# Patient Record
Sex: FEMALE | Race: WHITE | NOT HISPANIC OR LATINO | Employment: OTHER | URBAN - METROPOLITAN AREA
[De-identification: names, ages, dates, MRNs, and addresses within clinical notes are randomized per-mention and may not be internally consistent; named-entity substitution may affect disease eponyms.]

---

## 2017-01-17 ENCOUNTER — APPOINTMENT (OUTPATIENT)
Dept: LAB | Facility: HOSPITAL | Age: 77
End: 2017-01-17
Payer: MEDICARE

## 2017-01-17 ENCOUNTER — TRANSCRIBE ORDERS (OUTPATIENT)
Dept: LAB | Facility: HOSPITAL | Age: 77
End: 2017-01-17

## 2017-01-17 DIAGNOSIS — R31.29 MICROSCOPIC HEMATURIA: ICD-10-CM

## 2017-01-17 DIAGNOSIS — H11.31 CONJUNCTIVAL HEMORRHAGE OF RIGHT EYE: ICD-10-CM

## 2017-01-17 DIAGNOSIS — R31.29 MICROSCOPIC HEMATURIA: Primary | ICD-10-CM

## 2017-01-17 LAB
BACTERIA UR QL AUTO: ABNORMAL /HPF
BILIRUB UR QL STRIP: NEGATIVE
CLARITY UR: CLEAR
COLOR UR: YELLOW
GLUCOSE UR STRIP-MCNC: NEGATIVE MG/DL
HGB UR QL STRIP.AUTO: ABNORMAL
KETONES UR STRIP-MCNC: ABNORMAL MG/DL
LEUKOCYTE ESTERASE UR QL STRIP: NEGATIVE
NITRITE UR QL STRIP: NEGATIVE
NON-SQ EPI CELLS URNS QL MICRO: ABNORMAL /HPF
PH UR STRIP.AUTO: 6.5 [PH] (ref 4.5–8)
PROT UR STRIP-MCNC: NEGATIVE MG/DL
RBC #/AREA URNS AUTO: ABNORMAL /HPF
SP GR UR STRIP.AUTO: 1.02 (ref 1–1.03)
UROBILINOGEN UR QL STRIP.AUTO: 0.2 E.U./DL
WBC #/AREA URNS AUTO: ABNORMAL /HPF

## 2017-01-17 PROCEDURE — 81001 URINALYSIS AUTO W/SCOPE: CPT | Performed by: PHYSICIAN ASSISTANT

## 2017-01-25 ENCOUNTER — APPOINTMENT (OUTPATIENT)
Dept: LAB | Facility: HOSPITAL | Age: 77
End: 2017-01-25
Attending: OPHTHALMOLOGY
Payer: MEDICARE

## 2017-01-25 DIAGNOSIS — H11.31 CONJUNCTIVAL HEMORRHAGE OF RIGHT EYE: ICD-10-CM

## 2017-01-25 LAB
DEPRECATED AT III PPP: 98 % OF NORMAL (ref 92–136)
ERYTHROCYTE [DISTWIDTH] IN BLOOD BY AUTOMATED COUNT: 14.2 % (ref 11.6–15.1)
HCT VFR BLD AUTO: 38.6 % (ref 34.8–46.1)
HGB BLD-MCNC: 12.7 G/DL (ref 11.5–15.4)
INR PPP: 1.02 (ref 0.86–1.16)
MCH RBC QN AUTO: 30.2 PG (ref 26.8–34.3)
MCHC RBC AUTO-ENTMCNC: 32.9 G/DL (ref 31.4–37.4)
MCV RBC AUTO: 92 FL (ref 82–98)
PLATELET # BLD AUTO: 268 THOUSANDS/UL (ref 149–390)
PMV BLD AUTO: 9.7 FL (ref 8.9–12.7)
PROTHROMBIN TIME: 13.5 SECONDS (ref 12–14.3)
RBC # BLD AUTO: 4.2 MILLION/UL (ref 3.81–5.12)
WBC # BLD AUTO: 7.22 THOUSAND/UL (ref 4.31–10.16)

## 2017-01-25 PROCEDURE — 85306 CLOT INHIBIT PROT S FREE: CPT

## 2017-01-25 PROCEDURE — 85027 COMPLETE CBC AUTOMATED: CPT

## 2017-01-25 PROCEDURE — 85303 CLOT INHIBIT PROT C ACTIVITY: CPT

## 2017-01-25 PROCEDURE — 36415 COLL VENOUS BLD VENIPUNCTURE: CPT

## 2017-01-25 PROCEDURE — 81241 F5 GENE: CPT

## 2017-01-25 PROCEDURE — 85610 PROTHROMBIN TIME: CPT

## 2017-01-25 PROCEDURE — 85300 ANTITHROMBIN III ACTIVITY: CPT

## 2017-01-26 LAB — PROT C AG ACT/NOR PPP IA: 125 % OF NORMAL (ref 60–150)

## 2017-01-27 LAB — PROT S ACT/NOR PPP: 78 % (ref 63–140)

## 2017-01-30 ENCOUNTER — APPOINTMENT (OUTPATIENT)
Dept: LAB | Facility: HOSPITAL | Age: 77
End: 2017-01-30
Payer: MEDICARE

## 2017-01-30 ENCOUNTER — ALLSCRIPTS OFFICE VISIT (OUTPATIENT)
Dept: OTHER | Facility: OTHER | Age: 77
End: 2017-01-30

## 2017-01-30 DIAGNOSIS — R31.29 MICROSCOPIC HEMATURIA: ICD-10-CM

## 2017-01-30 PROCEDURE — 88112 CYTOPATH CELL ENHANCE TECH: CPT

## 2017-01-31 LAB
F5 GENE MUT ANL BLD/T: NORMAL
FACTOR V LEIDEN INTERPRETATION: NORMAL

## 2017-06-06 ENCOUNTER — HOSPITAL ENCOUNTER (OUTPATIENT)
Dept: RADIOLOGY | Facility: HOSPITAL | Age: 77
Discharge: HOME | End: 2017-06-06
Payer: MEDICARE

## 2017-06-06 DIAGNOSIS — Z12.31 ENCOUNTER FOR SCREENING MAMMOGRAM FOR BREAST CANCER: ICD-10-CM

## 2017-06-06 PROCEDURE — G0202 SCR MAMMO BI INCL CAD: HCPCS

## 2017-06-14 ENCOUNTER — TRANSCRIBE ORDERS (OUTPATIENT)
Dept: LAB | Facility: HOSPITAL | Age: 77
End: 2017-06-14

## 2017-06-14 ENCOUNTER — APPOINTMENT (OUTPATIENT)
Dept: LAB | Facility: HOSPITAL | Age: 77
End: 2017-06-14
Payer: MEDICARE

## 2017-06-14 DIAGNOSIS — IMO0001 MYALGIA AND MYOSITIS: ICD-10-CM

## 2017-06-14 DIAGNOSIS — IMO0001 MYALGIA AND MYOSITIS: Primary | ICD-10-CM

## 2017-06-14 LAB
CK SERPL-CCNC: 129 U/L (ref 26–192)
ERYTHROCYTE [SEDIMENTATION RATE] IN BLOOD: 6 MM/HOUR (ref 0–20)

## 2017-06-14 PROCEDURE — 36415 COLL VENOUS BLD VENIPUNCTURE: CPT

## 2017-06-14 PROCEDURE — 85652 RBC SED RATE AUTOMATED: CPT

## 2017-06-14 PROCEDURE — 82550 ASSAY OF CK (CPK): CPT

## 2017-10-02 DIAGNOSIS — N39.0 URINARY TRACT INFECTION: ICD-10-CM

## 2017-10-03 ENCOUNTER — APPOINTMENT (OUTPATIENT)
Dept: LAB | Facility: HOSPITAL | Age: 77
End: 2017-10-03
Attending: UROLOGY
Payer: MEDICARE

## 2017-10-03 ENCOUNTER — TRANSCRIBE ORDERS (OUTPATIENT)
Dept: LAB | Facility: HOSPITAL | Age: 77
End: 2017-10-03

## 2017-10-03 DIAGNOSIS — N39.0 URINARY TRACT INFECTION: ICD-10-CM

## 2017-10-03 LAB
BACTERIA UR QL AUTO: ABNORMAL /HPF
BILIRUB UR QL STRIP: NEGATIVE
CLARITY UR: CLEAR
COLOR UR: YELLOW
GLUCOSE UR STRIP-MCNC: NEGATIVE MG/DL
HGB UR QL STRIP.AUTO: ABNORMAL
HYALINE CASTS #/AREA URNS LPF: ABNORMAL /LPF
KETONES UR STRIP-MCNC: NEGATIVE MG/DL
LEUKOCYTE ESTERASE UR QL STRIP: NEGATIVE
NITRITE UR QL STRIP: NEGATIVE
NON-SQ EPI CELLS URNS QL MICRO: ABNORMAL /HPF
PH UR STRIP.AUTO: 5.5 [PH] (ref 4.5–8)
PROT UR STRIP-MCNC: NEGATIVE MG/DL
RBC #/AREA URNS AUTO: ABNORMAL /HPF
SP GR UR STRIP.AUTO: 1.01 (ref 1–1.03)
UROBILINOGEN UR QL STRIP.AUTO: 0.2 E.U./DL
WBC #/AREA URNS AUTO: ABNORMAL /HPF

## 2017-10-03 PROCEDURE — 87086 URINE CULTURE/COLONY COUNT: CPT

## 2017-10-03 PROCEDURE — 81001 URINALYSIS AUTO W/SCOPE: CPT

## 2017-10-04 LAB — BACTERIA UR CULT: NORMAL

## 2018-01-15 VITALS
HEIGHT: 67 IN | DIASTOLIC BLOOD PRESSURE: 70 MMHG | WEIGHT: 171.13 LBS | BODY MASS INDEX: 26.86 KG/M2 | SYSTOLIC BLOOD PRESSURE: 116 MMHG

## 2018-01-22 ENCOUNTER — APPOINTMENT (OUTPATIENT)
Dept: LAB | Facility: HOSPITAL | Age: 78
End: 2018-01-22
Payer: MEDICARE

## 2018-01-22 ENCOUNTER — TRANSCRIBE ORDERS (OUTPATIENT)
Dept: LAB | Facility: HOSPITAL | Age: 78
End: 2018-01-22

## 2018-01-22 DIAGNOSIS — M50.320 DEGENERATION OF INTERVERTEBRAL DISC OF MID-CERVICAL REGION, UNSPECIFIED SPINAL LEVEL: ICD-10-CM

## 2018-01-22 DIAGNOSIS — L40.0 PSORIASIS VULGARIS: ICD-10-CM

## 2018-01-22 DIAGNOSIS — M70.61 TROCHANTERIC BURSITIS OF RIGHT HIP: ICD-10-CM

## 2018-01-22 DIAGNOSIS — M25.551 PAIN OF RIGHT HIP JOINT: ICD-10-CM

## 2018-01-22 DIAGNOSIS — M54.5 LOW BACK PAIN, UNSPECIFIED BACK PAIN LATERALITY, UNSPECIFIED CHRONICITY, WITH SCIATICA PRESENCE UNSPECIFIED: ICD-10-CM

## 2018-01-22 DIAGNOSIS — M16.11 PRIMARY OSTEOARTHRITIS OF RIGHT HIP: ICD-10-CM

## 2018-01-22 DIAGNOSIS — M25.552 ACUTE PAIN OF LEFT HIP: ICD-10-CM

## 2018-01-22 DIAGNOSIS — M25.511 RIGHT SHOULDER PAIN, UNSPECIFIED CHRONICITY: ICD-10-CM

## 2018-01-22 DIAGNOSIS — M54.2 CERVICALGIA: ICD-10-CM

## 2018-01-22 DIAGNOSIS — M54.5 LOW BACK PAIN, UNSPECIFIED BACK PAIN LATERALITY, UNSPECIFIED CHRONICITY, WITH SCIATICA PRESENCE UNSPECIFIED: Primary | ICD-10-CM

## 2018-01-22 DIAGNOSIS — M25.562 LEFT KNEE PAIN, UNSPECIFIED CHRONICITY: ICD-10-CM

## 2018-01-22 LAB
ALBUMIN SERPL BCP-MCNC: 3.6 G/DL (ref 3.5–5)
ALP SERPL-CCNC: 62 U/L (ref 46–116)
ALT SERPL W P-5'-P-CCNC: 20 U/L (ref 12–78)
ANION GAP SERPL CALCULATED.3IONS-SCNC: 4 MMOL/L (ref 4–13)
AST SERPL W P-5'-P-CCNC: 13 U/L (ref 5–45)
BASOPHILS # BLD AUTO: 0.1 THOUSANDS/ΜL (ref 0–0.1)
BASOPHILS NFR BLD AUTO: 2 % (ref 0–1)
BILIRUB SERPL-MCNC: 0.57 MG/DL (ref 0.2–1)
BUN SERPL-MCNC: 16 MG/DL (ref 5–25)
CALCIUM SERPL-MCNC: 9.2 MG/DL (ref 8.3–10.1)
CHLORIDE SERPL-SCNC: 108 MMOL/L (ref 100–108)
CO2 SERPL-SCNC: 28 MMOL/L (ref 21–32)
CREAT SERPL-MCNC: 0.71 MG/DL (ref 0.6–1.3)
CRP SERPL QL: 3.4 MG/L
EOSINOPHIL # BLD AUTO: 0.59 THOUSAND/ΜL (ref 0–0.61)
EOSINOPHIL NFR BLD AUTO: 11 % (ref 0–6)
ERYTHROCYTE [DISTWIDTH] IN BLOOD BY AUTOMATED COUNT: 13.9 % (ref 11.6–15.1)
ERYTHROCYTE [SEDIMENTATION RATE] IN BLOOD: 9 MM/HOUR (ref 0–20)
GFR SERPL CREATININE-BSD FRML MDRD: 82 ML/MIN/1.73SQ M
GLUCOSE P FAST SERPL-MCNC: 82 MG/DL (ref 65–99)
HCT VFR BLD AUTO: 36.9 % (ref 34.8–46.1)
HGB BLD-MCNC: 12.4 G/DL (ref 11.5–15.4)
LYMPHOCYTES # BLD AUTO: 2.1 THOUSANDS/ΜL (ref 0.6–4.47)
LYMPHOCYTES NFR BLD AUTO: 40 % (ref 14–44)
MCH RBC QN AUTO: 31.1 PG (ref 26.8–34.3)
MCHC RBC AUTO-ENTMCNC: 33.6 G/DL (ref 31.4–37.4)
MCV RBC AUTO: 93 FL (ref 82–98)
MONOCYTES # BLD AUTO: 0.47 THOUSAND/ΜL (ref 0.17–1.22)
MONOCYTES NFR BLD AUTO: 9 % (ref 4–12)
NEUTROPHILS # BLD AUTO: 1.95 THOUSANDS/ΜL (ref 1.85–7.62)
NEUTS SEG NFR BLD AUTO: 38 % (ref 43–75)
NRBC BLD AUTO-RTO: 0 /100 WBCS
PLATELET # BLD AUTO: 249 THOUSANDS/UL (ref 149–390)
PMV BLD AUTO: 9.8 FL (ref 8.9–12.7)
POTASSIUM SERPL-SCNC: 4.4 MMOL/L (ref 3.5–5.3)
PROT SERPL-MCNC: 7.3 G/DL (ref 6.4–8.2)
RBC # BLD AUTO: 3.99 MILLION/UL (ref 3.81–5.12)
SODIUM SERPL-SCNC: 140 MMOL/L (ref 136–145)
TSH SERPL DL<=0.05 MIU/L-ACNC: 3.16 UIU/ML (ref 0.36–3.74)
WBC # BLD AUTO: 5.21 THOUSAND/UL (ref 4.31–10.16)

## 2018-01-22 PROCEDURE — 85025 COMPLETE CBC W/AUTO DIFF WBC: CPT

## 2018-01-22 PROCEDURE — 85652 RBC SED RATE AUTOMATED: CPT

## 2018-01-22 PROCEDURE — 36415 COLL VENOUS BLD VENIPUNCTURE: CPT

## 2018-01-22 PROCEDURE — 86140 C-REACTIVE PROTEIN: CPT

## 2018-01-22 PROCEDURE — 80053 COMPREHEN METABOLIC PANEL: CPT

## 2018-01-22 PROCEDURE — 84443 ASSAY THYROID STIM HORMONE: CPT

## 2018-01-23 ENCOUNTER — HOSPITAL ENCOUNTER (OUTPATIENT)
Dept: RADIOLOGY | Facility: HOSPITAL | Age: 78
Discharge: HOME | End: 2018-01-23
Payer: MEDICARE

## 2018-01-23 DIAGNOSIS — M25.551 PAIN OF RIGHT HIP JOINT: ICD-10-CM

## 2018-01-23 DIAGNOSIS — M25.552 ACUTE PAIN OF LEFT HIP: ICD-10-CM

## 2018-01-23 DIAGNOSIS — M25.562 LEFT KNEE PAIN, UNSPECIFIED CHRONICITY: ICD-10-CM

## 2018-01-23 DIAGNOSIS — M54.5 LOW BACK PAIN, UNSPECIFIED BACK PAIN LATERALITY, UNSPECIFIED CHRONICITY, WITH SCIATICA PRESENCE UNSPECIFIED: ICD-10-CM

## 2018-01-23 PROCEDURE — 73523 X-RAY EXAM HIPS BI 5/> VIEWS: CPT

## 2018-01-23 PROCEDURE — 73564 X-RAY EXAM KNEE 4 OR MORE: CPT

## 2018-01-23 PROCEDURE — 72110 X-RAY EXAM L-2 SPINE 4/>VWS: CPT

## 2018-01-23 PROCEDURE — 72200 X-RAY EXAM SI JOINTS: CPT

## 2018-01-30 ENCOUNTER — APPOINTMENT (OUTPATIENT)
Dept: LAB | Facility: HOSPITAL | Age: 78
End: 2018-01-30
Payer: MEDICARE

## 2018-01-30 DIAGNOSIS — R31.29 OTHER MICROSCOPIC HEMATURIA: ICD-10-CM

## 2018-01-30 LAB
BACTERIA UR QL AUTO: ABNORMAL /HPF
BILIRUB UR QL STRIP: NEGATIVE
CLARITY UR: CLEAR
COLOR UR: YELLOW
GLUCOSE UR STRIP-MCNC: NEGATIVE MG/DL
HGB UR QL STRIP.AUTO: ABNORMAL
KETONES UR STRIP-MCNC: NEGATIVE MG/DL
LEUKOCYTE ESTERASE UR QL STRIP: NEGATIVE
NITRITE UR QL STRIP: NEGATIVE
NON-SQ EPI CELLS URNS QL MICRO: ABNORMAL /HPF
PH UR STRIP.AUTO: 5.5 [PH] (ref 4.5–8)
PROT UR STRIP-MCNC: NEGATIVE MG/DL
RBC #/AREA URNS AUTO: ABNORMAL /HPF
SP GR UR STRIP.AUTO: 1.01 (ref 1–1.03)
UROBILINOGEN UR QL STRIP.AUTO: 0.2 E.U./DL
WBC #/AREA URNS AUTO: ABNORMAL /HPF

## 2018-01-30 PROCEDURE — 81001 URINALYSIS AUTO W/SCOPE: CPT

## 2018-01-30 PROCEDURE — 88112 CYTOPATH CELL ENHANCE TECH: CPT

## 2018-01-30 PROCEDURE — 88112 CYTOPATH CELL ENHANCE TECH: CPT | Performed by: PATHOLOGY

## 2018-02-12 ENCOUNTER — OFFICE VISIT (OUTPATIENT)
Dept: UROLOGY | Facility: AMBULATORY SURGERY CENTER | Age: 78
End: 2018-02-12
Payer: MEDICARE

## 2018-02-12 VITALS
HEART RATE: 72 BPM | WEIGHT: 169 LBS | SYSTOLIC BLOOD PRESSURE: 104 MMHG | HEIGHT: 67 IN | DIASTOLIC BLOOD PRESSURE: 66 MMHG | BODY MASS INDEX: 26.53 KG/M2

## 2018-02-12 DIAGNOSIS — R35.0 INCREASED FREQUENCY OF URINATION: ICD-10-CM

## 2018-02-12 DIAGNOSIS — R31.29 MICROSCOPIC HEMATURIA: Primary | ICD-10-CM

## 2018-02-12 PROCEDURE — 99213 OFFICE O/P EST LOW 20 MIN: CPT | Performed by: NURSE PRACTITIONER

## 2018-02-12 RX ORDER — MULTIVITAMIN
TABLET ORAL
COMMUNITY
End: 2021-03-29 | Stop reason: ALTCHOICE

## 2018-02-12 RX ORDER — MULTIVIT WITH MINERALS/LUTEIN
TABLET ORAL DAILY
COMMUNITY

## 2018-02-12 NOTE — PROGRESS NOTES
2/12/2018    Randee Nunez  1940  [id number]        Assessment  Microscopic hematuria  Atypic cytology  Urinary frequency    Discussion  Naa French is a 65 y o  female being managed by Dr Paula  Her urine cytology continues to show rare atypical urothelial cells  However, there is no evidence of microscopic hematuria on her urinalysis  She is s/p an urethral and bladder biopsy in 3/2009, which was negative for malignancy  We discussed the use of  anticholinergics but she defers  Instructed to avoid bladder irritants  She will return in 1 year for follow up with an urinalysis and urine cytology  Instructed to call with any issues  History of Present Illness  65 y o  female with a history of hematuria and abnormal urine cytology presents today for 1 year follow up  She denies any gross hematuria  She denies any lower urinary tract symptoms except for urinary frequency and urgency  She has nocturia 2-4 times during the night  Overall she does not find urinary symptoms bothersome  She denies any incontinence  She denies any changes in overall health and is doing well  Review of Systems  Review of Systems   Constitutional: Negative  HENT: Negative  Respiratory: Negative  Cardiovascular: Negative  Gastrointestinal: Negative  Genitourinary: Positive for frequency and urgency  Musculoskeletal: Negative  Skin: Negative  Neurological: Negative  Hematological: Negative  Urinary Incontinence Screening    Flowsheet Row Most Recent Value   Urinary Incontinence   Urinary Incontinence? No   Incomplete emptying? Yes   Urinary frequency? Yes [occ]   Urinary urgency? Yes [occ  feel as if urine comes out when not feeling to go]   Urinary hesitancy? No   Dysuria (painful difficult urination)? No   Nocturia (waking up to use the bathroom)? Yes [2-4 times]   Straining (having to push to go)? No   Weak stream?  Yes   Intermittent stream?  Yes [occ]   Post void dribbling?   No Vaginal pressure? No   Vaginal dryness? No            Past Medical History  Past Medical History:   Diagnosis Date    Bladder polyps     Breast cancer ([facility] [address] )     GERD (gastroesophageal reflux disease)        Past Surgical History  Past Surgical History:   Procedure Laterality Date    APPENDECTOMY      BREAST SURGERY      CYSTOSCOPY          Past Family History  Family History   Problem Relation Age of Onset    Lymphoma Mother     Heart disease Father        Past Social history  Social History     Social History    Marital status: /Civil Union     Spouse name: N/A    Number of children: N/A    Years of education: N/A     Occupational History    Not on file  Social History Main Topics    Smoking status: Never Smoker    Smokeless tobacco: Never Used    Alcohol use Yes      Comment: SOCIAL DRINKER    Drug use: No    Sexual activity: Not on file     Other Topics Concern    Not on file     Social History Narrative    No narrative on file       Current Medications  Current Outpatient Prescriptions   Medication Sig Dispense Refill    Ascorbic Acid (VITAMIN C) 1000 MG tablet Take by mouth      B-Complex-C CAPS Take by mouth      Misc Natural Products (OSTEO BI-FLEX ADV JOINT SHIELD) TABS Take by mouth      Multiple Vitamin (MULTI-VITAMIN DAILY) TABS Take by mouth       No current facility-administered medications for this visit  Allergies  No Known Allergies    Past Medical History, Social History, Family History, medications and allergies were reviewed  Vitals  Vitals:    02/12/18 1048   BP: 104/66   Pulse: 72   Weight: 76 7 kg (169 lb)   Height: 5' 7" (1 702 m)       Physical Exam  Skin: warm, dry, intact  Pulmonary: Non-labored breathing  Abdomen: Soft, non-tender, non-distended  Musculoskeletal: AROM with no joint deformity or tenderness    Neurology: Alert and oriented    Results  Lab Results   Component Value Date    GLUCOSE 84 10/24/2014    CALCIUM 9 2 01/22/2018    NA 140 01/22/2018    K 4 4 01/22/2018    CO2 28 01/22/2018     01/22/2018    BUN 16 01/22/2018    CREATININE 0 71 01/22/2018     Lab Results   Component Value Date    WBC 5 21 01/22/2018    HGB 12 4 01/22/2018    HCT 36 9 01/22/2018    MCV 93 01/22/2018     01/22/2018

## 2018-02-23 ENCOUNTER — EVALUATION (OUTPATIENT)
Dept: PHYSICAL THERAPY | Age: 78
End: 2018-02-23
Payer: MEDICARE

## 2018-02-23 DIAGNOSIS — M70.61 TROCHANTERIC BURSITIS OF RIGHT HIP: ICD-10-CM

## 2018-02-23 DIAGNOSIS — M16.11 PRIMARY OSTEOARTHRITIS OF RIGHT HIP: ICD-10-CM

## 2018-02-23 DIAGNOSIS — G89.29 CHRONIC PAIN OF LEFT KNEE: ICD-10-CM

## 2018-02-23 DIAGNOSIS — M16.0 PRIMARY OSTEOARTHRITIS OF BOTH HIPS: ICD-10-CM

## 2018-02-23 DIAGNOSIS — M54.50 CHRONIC BILATERAL LOW BACK PAIN WITHOUT SCIATICA: Primary | ICD-10-CM

## 2018-02-23 DIAGNOSIS — M25.562 CHRONIC PAIN OF LEFT KNEE: ICD-10-CM

## 2018-02-23 DIAGNOSIS — G89.29 CHRONIC BILATERAL LOW BACK PAIN WITHOUT SCIATICA: Primary | ICD-10-CM

## 2018-02-23 PROCEDURE — 97140 MANUAL THERAPY 1/> REGIONS: CPT | Performed by: PHYSICAL THERAPIST

## 2018-02-23 PROCEDURE — G8979 MOBILITY GOAL STATUS: HCPCS | Performed by: PHYSICAL THERAPIST

## 2018-02-23 PROCEDURE — G8978 MOBILITY CURRENT STATUS: HCPCS | Performed by: PHYSICAL THERAPIST

## 2018-02-23 PROCEDURE — 97162 PT EVAL MOD COMPLEX 30 MIN: CPT | Performed by: PHYSICAL THERAPIST

## 2018-02-23 NOTE — LETTER
2018    Paula Judge MD  [address]    Patient: Edison Medina   YOB: 1940   Date of Visit: 2018     Encounter Diagnosis     ICD-10-CM    1  Chronic bilateral low back pain without sciatica M54 5     G89 29    2  Chronic pain of left knee M25 562     G89 29    3  Primary osteoarthritis of right hip M16 11    4  Trochanteric bursitis of right hip M70 61    5  Primary osteoarthritis of both hips M16 0        Dear Dr Sagar Bear:    Please review the attached Plan of Care from [facility] recent visit  Please verify that you agree therapy should continue by signing the attached document and sending it back to our office  If you have any questions or concerns, please don't hesitate to call  Sincerely,    Kushal Carty, PT      Referring Provider:      I certify that I have read the below Plan of Care and certify the need for these services furnished under this plan of treatment while under my care  Paula Judge MD  [facility]: [phone]          PT Evaluation     Today's date: 2018  Patient name: Edison Medina  : 1940  MRN: [id number]  Referring provider: [place] Tineo MD  Dx:   Encounter Diagnosis     ICD-10-CM    1  Chronic bilateral low back pain without sciatica M54 5     G89 29    2  Chronic pain of left knee M25 562     G89 29    3  Primary osteoarthritis of right hip M16 11    4  Trochanteric bursitis of right hip M70 61    5  Primary osteoarthritis of both hips M16 0                   Assessment  Impairments: abnormal gait, abnormal or restricted ROM, impaired physical strength and pain with function    Assessment details: PT IE due to functional mobility due to lumbar spine, bilateral hip and left knee pain that limits all weight baring functional activities    Understanding of Dx/Px/POC: excellent   Prognosis: good  Prognosis details: Patient is a 65y o  year old female seen for outpatient PT evaluation with a mobility deficits due to multiple region pain due to LBP, Bilateral Hip OA and left knee pain  Patient presents to PT IE with the following problems, concerns, deficits and impairments: lumbar, bilateral hip and knee pain, decreased lumbar spine range of motion, decreased bilateral le ROM and strength, + TTP, left knee edema, gait and stair dysfunctions, transfer dysfunctions, functional limitations and decreased tolerance to activity  Patient would benefit from skilled PT services under the following PT treatment plan to address the above noted deficits: therapeutic exercises and activities to facilitate lumbar spine ROM and bilateral LE ROM and MMT, gait and stair training, balance and proprioception activities, modalities, manual therapy techniques, Instrument Aided STM techniques, Kinesio taping, manual and mechanical traction, postural reeducation and strengthening, DLS and abdominal strengthening activities and a hep  Thank you for the referral      Goals  Short Term goals - 3 to 4 weeks  1  Patient will be independent HEP  2   Patient will report a 25 - 50% decrease in pain complaints  3   Increase strength 1/2 grade  4   Increase ROM 5-10 degrees  Long Term goals - 6 to 8 weeks  1  Patient will report elimination of pain complaints  2   Patient will return to all house hold functional activities without restriction  3   Patient will return to all recreational activities without restriction  4   ROM WFL  5   Strength 5/5   6   Patient to reported 50 % or greater reduction in TTP  7   Patient to reported ambulation and stair climbing 50 % or greater with regards to improvements  8   Patient to reported self foto computerized functional index > 10 points      Plan  Patient would benefit from: skilled PT  Planned modality interventions: TENS, thermotherapy: hydrocollator packs, ultrasound and unattended electrical stimulation  Planned therapy interventions: joint mobilization, manual therapy, ADL retraining, ADL training, motor coordination training, muscle pump exercises, balance/weight bearing training, balance, neuromuscular re-education, patient education, postural training, self care, strengthening, stretching, coordination, flexibility, therapeutic activities, therapeutic exercise, functional ROM exercises, gait training, graded activity, graded exercise, graded motor, home exercise program and therapeutic training  Frequency: 2x week  Duration in weeks: 8  Treatment plan discussed with: patient        Subjective Evaluation    History of Present Illness  Mechanism of injury: Patient reported multiple region pain has persisted for several years that limits prolonged standing and walking based functional activities  Patient noted history of bilateral hip injection with one in left and multiple times on right  Patient noted car and transfers limited by bilateral le weakness, difficulty with long term walking are limited by bilateral hip and bilateral knee region  Patient noted she does swim one time per week  Patient reported stiffness in the am limits all transfers, walking that effects bilateral hip and knees  Patient noted stair climbing remains difficult in the am more than once she is " warm up"  Patient noted her left knee pain and swelling  Plus, she noted she will have calf pain  Patient noted cervical spine pain is aggravate with prolonged extension based movements  Patient noted hep and hot shower is effective in cervical spine pain reduction  Patient noted lumbar spine pain is aggravated with walking > static standing and she denies lumbar spine aggravation with sitting  Patient reported lumbar spine pain at worst 8 at 10 and least at 0 of 10 at least, bilateral hip and knee pain at least at 3 of 10 and worst at 8 of 10    Patient noted she will intermittently take Tylenol or Aleve for pain reduction  Quality of life: good    Pain  At best pain ratin  At worst pain ratin  Quality: dull ache  Relieving factors: heat  Aggravating factors: standing, walking and lifting  Progression: worsening    Social Support  Stairs in house: yes   Lives in: multiple-level home  Lives with: spouse      Diagnostic Tests  MRI studies: abnormal  Patient Goals  Patient goals for therapy: decreased edema, decreased pain, increased motion and increased strength  Patient goal: Patient's goal": to get more active again"  Objective     Tenderness     Additional Tenderness Details  Patient is - TTP at lumbar spine erector spinae musculature  Patient is + TTP at bilateral lateral hip region at greater trochanter region at moderate levels and medial and lateral left knee at minimal to moderate levels      Active Range of Motion     Lumbar   Flexion: 106 degrees   Extension: 20 degrees   Left lateral flexion: 22 degrees   Right lateral flexion: 28 degrees   Left rotation: 45 degrees   Right rotation: 35 degrees   Left Hip   Flexion: 100 degrees with pain  Abduction: 22 degrees     Right Hip   Flexion: 94 degrees with pain  Abduction: 30 degrees with pain  Left Knee   Flexion: 116 degrees with pain  Extension: -5 degrees   Extensor la degrees     Right Knee   Flexion: 126 degrees   Extension: -4 degrees   Extensor lag: 10 degrees   Left Ankle/Foot   Dorsiflexion (ke): 10 degrees   Plantar flexion: 60 degrees     Right Ankle/Foot   Dorsiflexion (ke): 8 degrees   Plantar flexion: 55 degrees     Additional Active Range of Motion Details  SLR on right at 90 degrees and left at 90 degrees    Strength/Myotome Testing     Left Hip   Planes of Motion   Flexion: 4+  Extension: 4+  Abduction: 4+  Adduction: 5    Right Hip   Planes of Motion   Flexion: 4+  Extension: 4+  Abduction: 4+  Adduction: 5    Left Knee   Flexion: 4  Prone flexion: 4    Right Knee   Flexion: 4+  Prone flexion: 4+    Left Ankle/Foot   Dorsiflexion: 5  Plantar flexion: 5    Right Ankle/Foot   Dorsiflexion: 5  Plantar flexion: 5    Ambulation     Ambulation: Level Surfaces   Ambulation without assistive device: independent    Additional Level Surfaces Ambulation Details  Patient ambulates with left knee based antalgic gait pattern of decrease in left stance phase and right swing phase  Ambulation: Stairs   Ascend stairs: independent  Pattern: reciprocal  Railings: two rails  Descend stairs: independent  Pattern: non-reciprocal  Railings: two rails    General Comments     Knee Comments  Girth Measurements:  Knee:  Suprapatellar region: Right at 40 2 CM and left at 42 1 CM; Mid patellar region: Right at 39 8 CM and left at 42 8 Cm; Infrapatellar region: Right at 38 4 CM and left at 41 1 Cm;         Flowsheet Rows    Flowsheet Row Most Recent Value   PT/OT G-Codes   Current Score  46   Projected Score  55   FOTO information reviewed  Yes   Assessment Type  Evaluation   G code set  Mobility: Walking & Moving Around   Mobility: Walking and Moving Around Current Status ()  CK   Mobility: Walking and Moving Around Goal Status ()  CK          Precautions: Cervical and Lumbar spine pain aggravation    Daily Treatment Diary     Manual  2/23            Kinesio taping to left knee in a "u" manner with base at paper tape tension and tails at 25 % 10 min            Instrument Aided STM to bilateral lateral hip region  add                                                      Exercise Diary  2/23            Bike             Repeated seated lumbar spine flexion             LAQ:B:             Hip flexion:B:             Piriformis stretch:B:             LTR:B:             DLS abdominal bracing             DLS with hip flexion:B:             DLS with SLR flexion:B:             Bridges             SAQ:B:             SLR x 3:B:             Mini squats             Lunges:B:             Step ups:B:8":             Step downs:B:6":             Lateral step ups:B: Hip hiking:B:                                           Modalities  2/23            MHP and TENS to lumbar spine and bilateral hip MHP

## 2018-02-23 NOTE — PROGRESS NOTES
PT Evaluation     Today's date: 2018  Patient name: Barbara Roldan  : 1940  MRN: [id number]  Referring provider: Lj Sung MD  Dx:   Encounter Diagnosis     ICD-10-CM    1  Chronic bilateral low back pain without sciatica M54 5     G89 29    2  Chronic pain of left knee M25 562     G89 29    3  Primary osteoarthritis of right hip M16 11    4  Trochanteric bursitis of right hip M70 61    5  Primary osteoarthritis of both hips M16 0                   Assessment  Impairments: abnormal gait, abnormal or restricted ROM, impaired physical strength and pain with function    Assessment details: PT IE due to functional mobility due to lumbar spine, bilateral hip and left knee pain that limits all weight baring functional activities  Understanding of Dx/Px/POC: excellent   Prognosis: good  Prognosis details: Patient is a 65y o  year old female seen for outpatient PT evaluation with a mobility deficits due to multiple region pain due to LBP, Bilateral Hip OA and left knee pain  Patient presents to PT IE with the following problems, concerns, deficits and impairments: lumbar, bilateral hip and knee pain, decreased lumbar spine range of motion, decreased bilateral le ROM and strength, + TTP, left knee edema, gait and stair dysfunctions, transfer dysfunctions, functional limitations and decreased tolerance to activity  Patient would benefit from skilled PT services under the following PT treatment plan to address the above noted deficits: therapeutic exercises and activities to facilitate lumbar spine ROM and bilateral LE ROM and MMT, gait and stair training, balance and proprioception activities, modalities, manual therapy techniques, Instrument Aided STM techniques, Kinesio taping, manual and mechanical traction, postural reeducation and strengthening, DLS and abdominal strengthening activities and a hep  Thank you for the referral      Goals  Short Term goals - 3 to 4 weeks  1    Patient will be independent HEP  2   Patient will report a 25 - 50% decrease in pain complaints  3   Increase strength 1/2 grade  4   Increase ROM 5-10 degrees  Long Term goals - 6 to 8 weeks  1  Patient will report elimination of pain complaints  2   Patient will return to all house hold functional activities without restriction  3   Patient will return to all recreational activities without restriction  4   ROM WFL  5   Strength 5/5   6   Patient to reported 50 % or greater reduction in TTP  7   Patient to reported ambulation and stair climbing 50 % or greater with regards to improvements  8   Patient to reported self foto computerized functional index > 10 points  Plan  Patient would benefit from: skilled PT  Planned modality interventions: TENS, thermotherapy: hydrocollator packs, ultrasound and unattended electrical stimulation  Planned therapy interventions: joint mobilization, manual therapy, ADL retraining, ADL training, motor coordination training, muscle pump exercises, balance/weight bearing training, balance, neuromuscular re-education, patient education, postural training, self care, strengthening, stretching, coordination, flexibility, therapeutic activities, therapeutic exercise, functional ROM exercises, gait training, graded activity, graded exercise, graded motor, home exercise program and therapeutic training  Frequency: 2x week  Duration in weeks: 8  Treatment plan discussed with: patient        Subjective Evaluation    History of Present Illness  Mechanism of injury: Patient reported multiple region pain has persisted for several years that limits prolonged standing and walking based functional activities  Patient noted history of bilateral hip injection with one in left and multiple times on right  Patient noted car and transfers limited by bilateral le weakness, difficulty with long term walking are limited by bilateral hip and bilateral knee region    Patient noted she does swim one time per week  Patient reported stiffness in the am limits all transfers, walking that effects bilateral hip and knees  Patient noted stair climbing remains difficult in the am more than once she is " warm up"  Patient noted her left knee pain and swelling  Plus, she noted she will have calf pain  Patient noted cervical spine pain is aggravate with prolonged extension based movements  Patient noted hep and hot shower is effective in cervical spine pain reduction  Patient noted lumbar spine pain is aggravated with walking > static standing and she denies lumbar spine aggravation with sitting  Patient reported lumbar spine pain at worst 8 at 10 and least at 0 of 10 at least, bilateral hip and knee pain at least at 3 of 10 and worst at 8 of 10  Patient noted she will intermittently take Tylenol or Aleve for pain reduction  Quality of life: good    Pain  At best pain ratin  At worst pain ratin  Quality: dull ache  Relieving factors: heat  Aggravating factors: standing, walking and lifting  Progression: worsening    Social Support  Stairs in house: yes   Lives in: multiple-level home  Lives with: spouse      Diagnostic Tests  MRI studies: abnormal  Patient Goals  Patient goals for therapy: decreased edema, decreased pain, increased motion and increased strength  Patient goal: Patient's goal": to get more active again"  Objective     Tenderness     Additional Tenderness Details  Patient is - TTP at lumbar spine erector spinae musculature  Patient is + TTP at bilateral lateral hip region at greater trochanter region at moderate levels and medial and lateral left knee at minimal to moderate levels      Active Range of Motion     Lumbar   Flexion: 106 degrees   Extension: 20 degrees   Left lateral flexion: 22 degrees   Right lateral flexion: 28 degrees   Left rotation: 45 degrees   Right rotation: 35 degrees   Left Hip   Flexion: 100 degrees with pain  Abduction: 22 degrees     Right Hip   Flexion: 94 degrees with pain  Abduction: 30 degrees with pain  Left Knee   Flexion: 116 degrees with pain  Extension: -5 degrees   Extensor la degrees     Right Knee   Flexion: 126 degrees   Extension: -4 degrees   Extensor lag: 10 degrees   Left Ankle/Foot   Dorsiflexion (ke): 10 degrees   Plantar flexion: 60 degrees     Right Ankle/Foot   Dorsiflexion (ke): 8 degrees   Plantar flexion: 55 degrees     Additional Active Range of Motion Details  SLR on right at 90 degrees and left at 90 degrees    Strength/Myotome Testing     Left Hip   Planes of Motion   Flexion: 4+  Extension: 4+  Abduction: 4+  Adduction: 5    Right Hip   Planes of Motion   Flexion: 4+  Extension: 4+  Abduction: 4+  Adduction: 5    Left Knee   Flexion: 4  Prone flexion: 4    Right Knee   Flexion: 4+  Prone flexion: 4+    Left Ankle/Foot   Dorsiflexion: 5  Plantar flexion: 5    Right Ankle/Foot   Dorsiflexion: 5  Plantar flexion: 5    Ambulation     Ambulation: Level Surfaces   Ambulation without assistive device: independent    Additional Level Surfaces Ambulation Details  Patient ambulates with left knee based antalgic gait pattern of decrease in left stance phase and right swing phase  Ambulation: Stairs   Ascend stairs: independent  Pattern: reciprocal  Railings: two rails  Descend stairs: independent  Pattern: non-reciprocal  Railings: two rails    General Comments     Knee Comments  Girth Measurements:  Knee:  Suprapatellar region: Right at 40 2 CM and left at 42 1 CM; Mid patellar region: Right at 39 8 CM and left at 42 8 Cm; Infrapatellar region: Right at 38 4 CM and left at 41 1 Cm;         Flowsheet Rows    Flowsheet Row Most Recent Value   PT/OT G-Codes   Current Score  46   Projected Score  55   FOTO information reviewed  Yes   Assessment Type  Evaluation   G code set  Mobility: Walking & Moving Around   Mobility: Walking and Moving Around Current Status ()  CK   Mobility: Walking and Moving Around Goal Status ()  CK Precautions: Cervical and Lumbar spine pain aggravation    Daily Treatment Diary     Manual  2/23            Kinesio taping to left knee in a "u" manner with base at paper tape tension and tails at 25 % 10 min            Instrument Aided STM to bilateral lateral hip region  add                                                      Exercise Diary  2/23            Bike             Repeated seated lumbar spine flexion             LAQ:B:             Hip flexion:B:             Piriformis stretch:B:             LTR:B:             DLS abdominal bracing             DLS with hip flexion:B:             DLS with SLR flexion:B:             Bridges             SAQ:B:             SLR x 3:B:             Mini squats             Lunges:B:             Step ups:B:8":             Step downs:B:6":             Lateral step ups:B:             Hip hiking:B:                                           Modalities  2/23            MHP and TENS to lumbar spine and bilateral hip MHP

## 2018-02-26 ENCOUNTER — OFFICE VISIT (OUTPATIENT)
Dept: PHYSICAL THERAPY | Age: 78
End: 2018-02-26
Payer: MEDICARE

## 2018-02-26 DIAGNOSIS — M16.11 PRIMARY OSTEOARTHRITIS OF RIGHT HIP: ICD-10-CM

## 2018-02-26 DIAGNOSIS — G89.29 CHRONIC PAIN OF LEFT KNEE: ICD-10-CM

## 2018-02-26 DIAGNOSIS — M16.0 PRIMARY OSTEOARTHRITIS OF BOTH HIPS: ICD-10-CM

## 2018-02-26 DIAGNOSIS — M54.50 CHRONIC BILATERAL LOW BACK PAIN WITHOUT SCIATICA: Primary | ICD-10-CM

## 2018-02-26 DIAGNOSIS — M70.61 TROCHANTERIC BURSITIS OF RIGHT HIP: ICD-10-CM

## 2018-02-26 DIAGNOSIS — M25.562 CHRONIC PAIN OF LEFT KNEE: ICD-10-CM

## 2018-02-26 DIAGNOSIS — G89.29 CHRONIC BILATERAL LOW BACK PAIN WITHOUT SCIATICA: Primary | ICD-10-CM

## 2018-02-26 PROCEDURE — 97110 THERAPEUTIC EXERCISES: CPT

## 2018-02-26 PROCEDURE — 97140 MANUAL THERAPY 1/> REGIONS: CPT

## 2018-02-26 PROCEDURE — 97112 NEUROMUSCULAR REEDUCATION: CPT

## 2018-02-26 NOTE — PROGRESS NOTES
Daily Note     Today's date: 2018  Patient name: Celeste Corral  : 1940  MRN: [id number]  Referring provider: Lizet Mc MD  Dx:   Encounter Diagnosis     ICD-10-CM    1  Chronic bilateral low back pain without sciatica M54 5     G89 29    2  Primary osteoarthritis of right hip M16 11    3  Chronic pain of left knee M25 562     G89 29    4  Trochanteric bursitis of right hip M70 61    5  Primary osteoarthritis of both hips M16 0                   Subjective: 4/10 LB and B knee pain         Objective: See treatment diary below      Assessment: Pt presents with palpable tender areas at B hips R>L       Plan: Progress as able     Cervical and Lumbar spine pain aggravation     Daily Treatment Diary      Manual                     Kinesio taping to left knee in a "u" manner with base at pInstrument Aided STM to bilateral lateral hip regionaper tape tension and tails at 25 % 10 min  10 min                                                                                                                        Exercise Diary                     Bike    10 min                    Repeated seated lumbar spine flexion   20x                   LAQ:B:   20x                   Hip flexion:B:    20x                   Piriformis stretch:B:    20sec 5x                    LTR:B:    3sec 20x                    DLS abdominal bracing    3sec 20x                    DLS with hip flexion:B:    NT                    DLS with SLR flexion:B:   20x                   Bridges    20x                   SAQ:B:    NT                    SLR x 3:B:    20x s/l and flex                    Mini squats    20x                   Lunges:B:    NT                    Step ups:B:8":    NT                    Step downs:B:6":    NT                    Lateral step ups:B:    NT                    Hip hiking:B:    NT                                                                          Modalities                     [facility] and TENS to lumbar spine and bilateral hip MHP    B MHP to B hips supine

## 2018-02-28 ENCOUNTER — OFFICE VISIT (OUTPATIENT)
Dept: PHYSICAL THERAPY | Age: 78
End: 2018-02-28
Payer: MEDICARE

## 2018-02-28 DIAGNOSIS — M16.11 PRIMARY OSTEOARTHRITIS OF RIGHT HIP: ICD-10-CM

## 2018-02-28 DIAGNOSIS — G89.29 CHRONIC PAIN OF LEFT KNEE: ICD-10-CM

## 2018-02-28 DIAGNOSIS — M25.562 CHRONIC PAIN OF LEFT KNEE: ICD-10-CM

## 2018-02-28 DIAGNOSIS — M16.0 PRIMARY OSTEOARTHRITIS OF BOTH HIPS: ICD-10-CM

## 2018-02-28 DIAGNOSIS — G89.29 CHRONIC BILATERAL LOW BACK PAIN WITHOUT SCIATICA: Primary | ICD-10-CM

## 2018-02-28 DIAGNOSIS — M70.61 TROCHANTERIC BURSITIS OF RIGHT HIP: ICD-10-CM

## 2018-02-28 DIAGNOSIS — M54.50 CHRONIC BILATERAL LOW BACK PAIN WITHOUT SCIATICA: Primary | ICD-10-CM

## 2018-02-28 PROCEDURE — 97140 MANUAL THERAPY 1/> REGIONS: CPT | Performed by: PHYSICAL THERAPIST

## 2018-02-28 PROCEDURE — 97110 THERAPEUTIC EXERCISES: CPT | Performed by: PHYSICAL THERAPIST

## 2018-02-28 NOTE — PROGRESS NOTES
Daily Note     Today's date: 2018  Patient name: Conner Nettles  : 1940  MRN: [id number]  Referring provider: Gomez Torres MD  Dx:   Encounter Diagnosis     ICD-10-CM    1  Chronic bilateral low back pain without sciatica M54 5     G89 29    2  Primary osteoarthritis of right hip M16 11    3  Chronic pain of left knee M25 562     G89 29    4  Trochanteric bursitis of right hip M70 61    5  Primary osteoarthritis of both hips M16 0                   Subjective: Patient reported right lateral hip pain is at 6 of 10 while LBP is at 4 of 10  Objective: See treatment diary below      Assessment: Patient presents with DOMS after last PT visit which she reported as bilateral le muscle soreness that persisted yesterday while she exhibit bilateral ITB / TFL TTP as pain aggravating areas thus continued use of modalities and manual therapy techniques for pain reduction         Plan: Progress as able     Cervical and Lumbar spine pain aggravation     Daily Treatment Diary      Manual                   Kinesio taping to left knee in a "u" manner with base at pInstrument Aided STM to bilateral lateral hip regionaper tape tension and tails at 25 % 10 min  10 min   10min STM                                                                                                                     Exercise Diary                   Bike    10 min   10 min                 Repeated seated lumbar spine flexion   20x  2x 10                 LAQ:B:   20x  20                 Hip flexion:B:    20x  20                 Piriformis stretch:B:    20sec 5x  [id number]                  LTR:B:    3sec 20x   [id number]                 DLS abdominal bracing    3sec 20x   [id number]                 DLS with hip flexion:B:    NT   NT                 DLS with SLR flexion:B:   20x  2 x 10                 Bridges    20x  2x 10                 SAQ:B:    NT  2x10                  SLR x 3:B:    20x s/l and flex  NT                  Mini squats    20x  2 x 10                 Lunges:B:    NT   2 x 10                 Step ups:B:8":    NT   2 x 10                 Step downs:B:6":    NT   NT                 Lateral step ups:B:    NT   NT                 Hip hiking:B:    NT   NT                                                                       Modalities  2/23 2/26 2/28                 MHP and TENS to lumbar spine and bilateral hip MHP    B MHP to B hips supine  MHP to bilateral hip supine x 10 min

## 2018-03-05 ENCOUNTER — OFFICE VISIT (OUTPATIENT)
Dept: PHYSICAL THERAPY | Age: 78
End: 2018-03-05
Payer: MEDICARE

## 2018-03-05 DIAGNOSIS — M54.50 CHRONIC BILATERAL LOW BACK PAIN WITHOUT SCIATICA: Primary | ICD-10-CM

## 2018-03-05 DIAGNOSIS — M70.61 TROCHANTERIC BURSITIS OF RIGHT HIP: ICD-10-CM

## 2018-03-05 DIAGNOSIS — M16.11 PRIMARY OSTEOARTHRITIS OF RIGHT HIP: ICD-10-CM

## 2018-03-05 DIAGNOSIS — G89.29 CHRONIC BILATERAL LOW BACK PAIN WITHOUT SCIATICA: Primary | ICD-10-CM

## 2018-03-05 DIAGNOSIS — M16.0 PRIMARY OSTEOARTHRITIS OF BOTH HIPS: ICD-10-CM

## 2018-03-05 DIAGNOSIS — M25.562 CHRONIC PAIN OF LEFT KNEE: ICD-10-CM

## 2018-03-05 DIAGNOSIS — G89.29 CHRONIC PAIN OF LEFT KNEE: ICD-10-CM

## 2018-03-05 PROCEDURE — 97110 THERAPEUTIC EXERCISES: CPT

## 2018-03-05 PROCEDURE — 97140 MANUAL THERAPY 1/> REGIONS: CPT

## 2018-03-05 NOTE — PROGRESS NOTES
Daily Note     Today's date: 3/5/2018  Patient name: Sebas Song  : 1940  MRN: [id number]  Referring provider: Chin Ferrer MD  Dx:   Encounter Diagnosis     ICD-10-CM    1  Chronic bilateral low back pain without sciatica M54 5     G89 29    2  Primary osteoarthritis of right hip M16 11    3  Trochanteric bursitis of right hip M70 61    4  Chronic pain of left knee M25 562     G89 29    5  Primary osteoarthritis of both hips M16 0                   Subjective: Pt reported L>R 2/10 keen pain no LB pain today  Objective: See treatment diary below      Assessment: Pt experienced relief at B knees no pain today   1:1 time [place], PT 45 min       Plan: Progress as able     Cervical and Lumbar spine pain aggravation     Daily Treatment Diary      Manual    3               Kinesio taping to left knee in a "u" manner with base at pInstrument Aided STM to bilateral lateral hip regionaper tape tension and tails at 25 % 10 min  10 min   10min STM  10 min                                                                                                                    Exercise Diary    3               Bike    10 min   10 min  10 min                Repeated seated lumbar spine flexion   20x  2x 10  NT                LAQ:B:   20x  20  2 5# 30x                Hip flexion:B:    20x  20  2 5# 30x                Piriformis stretch:B:    20sec 5x  [id number]   20sec 5x                LTR:B:    3sec 20x   [id number]  5sec 20x                DLS abdominal bracing    3sec 20x   [id number]  NT                DLS with hip flexion:B:    NT   NT  NT                DLS with SLR flexion:B:   20x  2 x 10  20x 2#                Bridges    20x  2x 10  20x 2sec                SAQ:B:    NT  2x10  30x 5sec 2#                SLR x 3:B:    20x s/l and flex  NT   2 5# 30x                Mini squats    20x  2 x 10  30x               Lunges:B:    NT   2 x 10  NT                Step ups:B:8":    NT   2 x 10  NT                Step downs:B:6":    NT   NT  NT                Lateral step ups:B:    NT   NT  NT                Hip hiking:B:    NT   NT  NT                                                                      Modalities  2/23  2/26  2/28  3/5               MHP and TENS to lumbar spine and bilateral hip MHP    B MHP to B hips supine  MHP to bilateral hip supine x 10 min MHP   To B knees

## 2018-03-07 ENCOUNTER — OFFICE VISIT (OUTPATIENT)
Dept: PHYSICAL THERAPY | Age: 78
End: 2018-03-07
Payer: MEDICARE

## 2018-03-07 DIAGNOSIS — G89.29 CHRONIC BILATERAL LOW BACK PAIN WITHOUT SCIATICA: Primary | ICD-10-CM

## 2018-03-07 DIAGNOSIS — M25.562 CHRONIC PAIN OF LEFT KNEE: ICD-10-CM

## 2018-03-07 DIAGNOSIS — M70.61 TROCHANTERIC BURSITIS OF RIGHT HIP: ICD-10-CM

## 2018-03-07 DIAGNOSIS — M16.0 PRIMARY OSTEOARTHRITIS OF BOTH HIPS: ICD-10-CM

## 2018-03-07 DIAGNOSIS — G89.29 CHRONIC PAIN OF LEFT KNEE: ICD-10-CM

## 2018-03-07 DIAGNOSIS — M16.11 PRIMARY OSTEOARTHRITIS OF RIGHT HIP: ICD-10-CM

## 2018-03-07 DIAGNOSIS — M54.50 CHRONIC BILATERAL LOW BACK PAIN WITHOUT SCIATICA: Primary | ICD-10-CM

## 2018-03-07 PROCEDURE — 97112 NEUROMUSCULAR REEDUCATION: CPT

## 2018-03-07 PROCEDURE — 97110 THERAPEUTIC EXERCISES: CPT

## 2018-03-07 NOTE — PROGRESS NOTES
Daily Note     Today's date: 3/7/2018  Patient name: Arturo Azevedo  : 1940  MRN: [id number]  Referring provider: Jesus Alberto Parada MD  Dx:   Encounter Diagnosis     ICD-10-CM    1  Chronic bilateral low back pain without sciatica M54 5     G89 29    2  Primary osteoarthritis of right hip M16 11    3  Trochanteric bursitis of right hip M70 61    4  Chronic pain of left knee M25 562     G89 29    5   Primary osteoarthritis of both hips M16 0                   Subjective: Pt reported stiffness at B LE       Objective: See treatment diary below      Assessment: MHP and ktape provide relief and support       Plan: Progress as able     Cervical and Lumbar spine pain aggravation     Daily Treatment Diary      Manual  2/23  2/26  2/28  3/5  3/7             Kinesio taping to left knee in a "u" manner with base at pInstrument Aided STM to bilateral lateral hip regionaper tape tension and tails at 25 % 10 min  10 min   10min STM  10 min   10 min                                                                                                                  Exercise Diary  2/23  2/26  2/28  3/5  3/7             Bike    10 min   10 min  10 min   10 min              Repeated seated lumbar spine flexion   20x  2x 10  NT   NT              LAQ:B:   20x  20  2 5# 30x   3# 20x              Hip flexion:B:    20x  20  2 5# 30x   3# 20x              Piriformis stretch:B:    20sec 5x  [id number]   20sec 5x   20sec 5x              LTR:B:    3sec 20x   98ncry1  5sec 20x   3sec 20x              DLS abdominal bracing    3sec 20x   [id number]  NT   NT              DLS with hip flexion:B:    NT   NT  NT   NT              DLS with SLR flexion:B:   20x  2 x 10  20x 2#   3# 20x              Bridges    20x  2x 10  20x 2sec   20x 3sec              SAQ:B:    NT  2x10  30x 5sec 2#   3# 30x             SLR x 3:B:    20x s/l and flex  NT   2 5# 30x   3# 20x              Mini squats    20x  2 x 10  30x  30x             Lunges:B:    NT   2 x 10  NT  NT              Step ups:B:8":    NT   2 x 10  NT   30x 3#             Step downs:B:6":    NT   NT  NT  NT              Lateral step ups:B:    NT   NT  NT   NT              Hip hiking:B:    NT   NT  NT   NT                                                                    Modalities  2/23  2/26  2/28  3/5 3/7             MHP and TENS to lumbar spine and bilateral hip MHP    B MHP to B hips supine  MHP to bilateral hip supine x 10 min MHP   To B knees  MHP to  LB and B knees

## 2018-03-12 ENCOUNTER — APPOINTMENT (OUTPATIENT)
Dept: PHYSICAL THERAPY | Age: 78
End: 2018-03-12
Payer: MEDICARE

## 2018-03-14 ENCOUNTER — OFFICE VISIT (OUTPATIENT)
Dept: PHYSICAL THERAPY | Age: 78
End: 2018-03-14
Payer: MEDICARE

## 2018-03-14 DIAGNOSIS — G89.29 CHRONIC BILATERAL LOW BACK PAIN WITHOUT SCIATICA: Primary | ICD-10-CM

## 2018-03-14 DIAGNOSIS — M16.0 PRIMARY OSTEOARTHRITIS OF BOTH HIPS: ICD-10-CM

## 2018-03-14 DIAGNOSIS — M25.562 CHRONIC PAIN OF LEFT KNEE: ICD-10-CM

## 2018-03-14 DIAGNOSIS — M54.50 CHRONIC BILATERAL LOW BACK PAIN WITHOUT SCIATICA: Primary | ICD-10-CM

## 2018-03-14 DIAGNOSIS — M16.11 PRIMARY OSTEOARTHRITIS OF RIGHT HIP: ICD-10-CM

## 2018-03-14 DIAGNOSIS — G89.29 CHRONIC PAIN OF LEFT KNEE: ICD-10-CM

## 2018-03-14 DIAGNOSIS — M70.61 TROCHANTERIC BURSITIS OF RIGHT HIP: ICD-10-CM

## 2018-03-14 PROCEDURE — 97140 MANUAL THERAPY 1/> REGIONS: CPT

## 2018-03-14 PROCEDURE — 97110 THERAPEUTIC EXERCISES: CPT

## 2018-03-14 NOTE — PROGRESS NOTES
Daily Note     Today's date: 3/14/2018  Patient name: Glory Rowe  : 1940  MRN: [id number]  Referring provider: Nikia Medina MD  Dx:   Encounter Diagnosis     ICD-10-CM    1  Chronic bilateral low back pain without sciatica M54 5     G89 29    2  Primary osteoarthritis of right hip M16 11    3  Trochanteric bursitis of right hip M70 61    4  Chronic pain of left knee M25 562     G89 29    5  Primary osteoarthritis of both hips M16 0                 Subjective: Patient reports swelling in L knee decreased since onset of PT and use of k-tape         Objective: See treatment diary below    Manual  2/23  2/26  2/28  3/5  3/7  3/14           Kinesio taping to left knee in a "u" manner with base at paper tape tension and tails at 25 % 10 min  10 min   10min STM  10 min   10 min   NT           IASTM, B lateral hips           10 min                                                                                         Exercise Diary  2/23  2/26  2/28  3/5  3/7  3/14           Bike    10 min   10 min  10 min   10 min   10 min           Repeated seated lumbar spine flexion   20x  2x 10  NT   NT   NT           LAQ:B:   20x  20  2 5# 30x   3# 20x   3#  20x           Hip flexion:B:    20x  20  2 5# 30x   3# 20x   3#  20x           Piriformis stretch:B:    20sec 5x  [id number]   20sec 5x   20sec 5x   5x :20           LTR:B:    3sec 20x   [id number]  5sec 20x   3sec 20x   20x :05           DLS abdominal bracing    3sec 20x   [id number]  NT   NT   NT           DLS with hip flexion:B:    NT   NT  NT   NT   NT           DLS with SLR flexion:B:   20x  2 x 10  20x 2#   3# 20x   NT           Bridges    20x  2x 10  20x 2sec   20x 3sec   20x  :03           SAQ:B:    NT  2x10  30x 5sec 2#   3# 30x  3#  20x           SLR x 3:B:    20x s/l and flex  NT   2 5# 30x   3# 20x   3#  20x           Mini squats    20x  2 x 10  30x  30x  20x           Lunges:B:    NT   2 x 10  NT   NT   NT           Step ups:B:8":    NT   2 x 10  NT   30x 3#  20x           Step downs:B:6":    NT   NT  NT  NT   NT           Lateral step ups:B:    NT   NT  NT   NT   NT           Hip hiking:B:    NT   NT  NT   NT   NT                                                                 Modalities  2/23  2/26  2/28  3/5 3/7  3/14           MHP and TENS to lumbar spine and bilateral hip MHP    B MHP to B hips supine  MHP to bilateral hip supine x 10 min MHP   To B knees  MHP to  LB and B knees   MHP to LB & B knees                                                                 Assessment: Continuing with plan of care as indicated  Patient responding well to interventions of IASTM and TE   Patient declining kinesiotape today as she would like to see how knee responds without the tape  Patient will benefit from continuation of physical therapy to improve strength, ROM and address unresolved pain  Plan: Continue plan of care  Progress as able

## 2018-03-19 ENCOUNTER — APPOINTMENT (OUTPATIENT)
Dept: PHYSICAL THERAPY | Age: 78
End: 2018-03-19
Payer: MEDICARE

## 2018-03-21 ENCOUNTER — APPOINTMENT (OUTPATIENT)
Dept: PHYSICAL THERAPY | Age: 78
End: 2018-03-21
Payer: MEDICARE

## 2018-03-26 ENCOUNTER — OFFICE VISIT (OUTPATIENT)
Dept: PHYSICAL THERAPY | Age: 78
End: 2018-03-26
Payer: MEDICARE

## 2018-03-26 DIAGNOSIS — R31.29 MICROSCOPIC HEMATURIA: Primary | ICD-10-CM

## 2018-03-26 DIAGNOSIS — R35.0 INCREASED FREQUENCY OF URINATION: ICD-10-CM

## 2018-03-26 PROCEDURE — 97110 THERAPEUTIC EXERCISES: CPT

## 2018-03-26 PROCEDURE — 97140 MANUAL THERAPY 1/> REGIONS: CPT

## 2018-03-26 NOTE — PROGRESS NOTES
Daily Note     Today's date: 3/26/2018  Patient name: Edison Medina  : 1940  MRN: [id number]  Referring provider: [place] Tineo MD  Dx:   Encounter Diagnosis     ICD-10-CM    1  Microscopic hematuria R31 29    2  Increased frequency of urination R35 0                 Subjective: Pt reported B LE stiffness and pain after shoveling several times over the last 2 weeks         Objective: See treatment diary below    Manual  2/23  2/26  2/28  3/5  3/7  3/14           Kinesio taping to left knee in a "u" manner with base at paper tape tension and tails at 25 % 10 min  10 min   10min STM  10 min   10 min   NT           IASTM, B lateral hips           10 min                                                                                         Exercise Diary  2/23  2/26  2/28  3/5  3/7  3/14  3/26         Bike    10 min   10 min  10 min   10 min   10 min  10 min          Repeated seated lumbar spine flexion   20x  2x 10  NT   NT   NT           LAQ:B:   20x  20  2 5# 30x   3# 20x   3#  20x           Hip flexion:B:    20x  20  2 5# 30x   3# 20x   3#  20x           Piriformis stretch:B:    20sec 5x  [id number]   20sec 5x   20sec 5x   5x :20           LTR:B:    3sec 20x   [id number]  5sec 20x   3sec 20x   20x :05           DLS abdominal bracing    3sec 20x   [id number]  NT   NT   NT           DLS with hip flexion:B:    NT   NT  NT   NT   NT           DLS with SLR flexion:B:   20x  2 x 10  20x 2#   3# 20x   NT           Bridges    20x  2x 10  20x 2sec   20x 3sec   20x  :03           SAQ:B:    NT  2x10  30x 5sec 2#   3# 30x  3#  20x           SLR x 3:B:    20x s/l and flex  NT   2 5# 30x   3# 20x   3#  20x           Mini squats    20x  2 x 10  30x  30x  20x           Lunges:B:    NT   2 x 10  NT   NT   NT           Step ups:B:8":    NT   2 x 10  NT   30x 3#  20x           Step downs:B:6":    NT   NT  NT  NT   NT           Lateral step ups:B:    NT   NT  NT   NT   NT           Hip hiking:B:    NT   NT  NT   NT   NT                                                               Modalities  2/23  2/26  2/28  3/5 3/7  3/14           MHP and TENS to lumbar spine and bilateral hip MHP    B MHP to B hips supine  MHP to bilateral hip supine x 10 min MHP   To B knees  MHP to  LB and B knees   MHP to LB & B knees                                                                 Assessment: Pt was challenged with exercises due to lack of attendance with inclement weather  Plan: Continue plan of care  Progress as able

## 2018-03-28 ENCOUNTER — OFFICE VISIT (OUTPATIENT)
Dept: PHYSICAL THERAPY | Age: 78
End: 2018-03-28
Payer: MEDICARE

## 2018-03-28 DIAGNOSIS — M16.11 PRIMARY OSTEOARTHRITIS OF RIGHT HIP: ICD-10-CM

## 2018-03-28 DIAGNOSIS — G89.29 CHRONIC BILATERAL LOW BACK PAIN WITHOUT SCIATICA: Primary | ICD-10-CM

## 2018-03-28 DIAGNOSIS — M54.50 CHRONIC BILATERAL LOW BACK PAIN WITHOUT SCIATICA: Primary | ICD-10-CM

## 2018-03-28 DIAGNOSIS — M16.0 PRIMARY OSTEOARTHRITIS OF BOTH HIPS: ICD-10-CM

## 2018-03-28 DIAGNOSIS — M70.61 TROCHANTERIC BURSITIS OF RIGHT HIP: ICD-10-CM

## 2018-03-28 PROCEDURE — G8979 MOBILITY GOAL STATUS: HCPCS | Performed by: PHYSICAL THERAPIST

## 2018-03-28 PROCEDURE — G8978 MOBILITY CURRENT STATUS: HCPCS | Performed by: PHYSICAL THERAPIST

## 2018-03-28 PROCEDURE — 97110 THERAPEUTIC EXERCISES: CPT | Performed by: PHYSICAL THERAPIST

## 2018-03-28 NOTE — PROGRESS NOTES
PT Evaluation / Re-Evaluation    Today's date: 3/28/2018  Patient name: Sivan aDnielle  : 1940  MRN: [id number]  Referring provider: Román Simons MD  Dx:   Encounter Diagnosis     ICD-10-CM    1  Chronic bilateral low back pain without sciatica M54 5     G89 29    2  Primary osteoarthritis of right hip M16 11    3  Trochanteric bursitis of right hip M70 61    4  Primary osteoarthritis of both hips M16 0                   Assessment  Impairments: abnormal gait, abnormal or restricted ROM, impaired physical strength and pain with function    Assessment details: PT IE due to functional mobility due to lumbar spine, bilateral hip and left knee pain that limits all weight baring functional activities  PT Reassessment: 2018  Understanding of Dx/Px/POC: excellent   Prognosis: good  Prognosis details: Patient reported the following improvement since onset of PT : decrease in multiple region pain, short term mobility and functional progress  But, she noted the following deficits persist:  Standing based functional activities, walking, stair climbing, bending, lifting based functional activities as well as house hold activities  Patient presents with the following progress since onset of PT : decrease in multiple region pain, increase in bilateral ue rom and strength, lumbar spine rom, gait and stair improvements and functional progress  But, she presents with the following deficit that still persist and would benefit from skilled PT services to continue to address under below listed PT treatment plan: therapeutic execise and activities to facilitate bilateral ue and lumbar spine rom, gait and stair dysfunctions and functional deficits  Goals  Short Term goals - 3 to 4 weeks  1  Patient will be independent HEP   MET  2   Patient will report a 25 - 50% decrease in pain complaints  MET  3  Increase strength 1/2 grade  MET  4  Increase ROM 5-10 degrees  MET    Long Term goals - 6 to 8 weeks  1  Patient will report elimination of pain complaints  Partially MET  2   Patient will return to all house hold functional activities without restriction  Partially MET  3   Patient will return to all recreational activities without restriction  Partially MET  4   ROM WFL  Partially MET  5   Strength 5/5  Partially MET  6   Patient to reported 50 % or greater reduction in TTP  Partially MET  7   Patient to reported ambulation and stair climbing 50 % or greater with regards to improvements  Partially MET  8   Patient to reported self foto computerized functional index > 10 points  Partially MET  Plan  Patient would benefit from: skilled PT  Planned modality interventions: TENS, thermotherapy: hydrocollator packs, ultrasound and unattended electrical stimulation  Planned therapy interventions: joint mobilization, manual therapy, ADL retraining, ADL training, motor coordination training, muscle pump exercises, balance/weight bearing training, balance, neuromuscular re-education, patient education, postural training, self care, strengthening, stretching, coordination, flexibility, therapeutic activities, therapeutic exercise, functional ROM exercises, gait training, graded activity, graded exercise, graded motor, home exercise program and therapeutic training  Frequency: 2x week  Duration in weeks: 8  Treatment plan discussed with: patient        Subjective Evaluation    History of Present Illness  Mechanism of injury: Patient reported multiple region pain has persisted for several years that limits prolonged standing and walking based functional activities  Patient noted history of bilateral hip injection with one in left and multiple times on right  Patient noted car and transfers limited by bilateral le weakness, difficulty with long term walking are limited by bilateral hip and bilateral knee region  Patient noted she does swim one time per week    Patient reported stiffness in the am limits all transfers, walking that effects bilateral hip and knees  Patient noted stair climbing remains difficult in the am more than once she is " warm up"  Patient noted her left knee pain and swelling  Plus, she noted she will have calf pain  Patient noted cervical spine pain is aggravate with prolonged extension based movements  Patient noted hep and hot shower is effective in cervical spine pain reduction  Patient noted lumbar spine pain is aggravated with walking > static standing and she denies lumbar spine aggravation with sitting  Patient reported lumbar spine pain at worst 8 at 10 and least at 0 of 10 at least, bilateral hip and knee pain at least at 3 of 10 and worst at 8 of 10  Patient noted she will intermittently take Tylenol or Aleve for pain reduction  Quality of life: good    Pain  At best pain ratin  At worst pain ratin  Quality: dull ache  Relieving factors: heat  Aggravating factors: standing, walking and lifting  Progression: worsening    Social Support  Stairs in house: yes   Lives in: multiple-level home  Lives with: spouse      Diagnostic Tests  MRI studies: abnormal  Patient Goals  Patient goals for therapy: decreased edema, decreased pain, increased motion and increased strength  Patient goal: Patient's goal": to get more active again"  Objective     Tenderness     Additional Tenderness Details  Patient is - TTP at lumbar spine erector spinae musculature  Patient is + TTP at bilateral lateral hip region at greater trochanter region at minimal to moderate levels and medial and lateral left knee at minimal to moderate levels      Active Range of Motion     Lumbar   Flexion: 106 degrees   Extension: 25 degrees   Left lateral flexion: 26 degrees   Right lateral flexion: 32 degrees   Left rotation: 55 degrees   Right rotation: 58 degrees   Left Hip   Flexion: 105 degrees with pain  Abduction: 25 degrees     Right Hip   Flexion: 104 degrees with pain  Abduction: 30 degrees with pain  Left Knee Flexion: 116 degrees with pain  Extension: -5 degrees   Extensor la degrees     Right Knee   Flexion: 126 degrees   Extension: -4 degrees   Extensor lag: 10 degrees   Left Ankle/Foot   Dorsiflexion (ke): 10 degrees   Plantar flexion: 60 degrees     Right Ankle/Foot   Dorsiflexion (ke): 8 degrees   Plantar flexion: 55 degrees     Additional Active Range of Motion Details  SLR on right at 90 degrees and left at 90 degrees    Strength/Myotome Testing     Left Hip   Planes of Motion   Flexion: 4+  Extension: 4+  Abduction: 4+  Adduction: 5    Right Hip   Planes of Motion   Flexion: 4+  Extension: 4+  Abduction: 4+  Adduction: 5    Left Knee   Flexion: 4  Prone flexion: 4    Right Knee   Flexion: 4+  Prone flexion: 4+    Left Ankle/Foot   Dorsiflexion: 5  Plantar flexion: 5    Right Ankle/Foot   Dorsiflexion: 5  Plantar flexion: 5    Ambulation     Ambulation: Level Surfaces   Ambulation without assistive device: independent    Additional Level Surfaces Ambulation Details  Patient ambulates with left knee based antalgic gait pattern of decrease in left stance phase and right swing phase  Ambulation: Stairs   Ascend stairs: independent  Pattern: reciprocal  Railings: two rails  Descend stairs: independent  Pattern: non-reciprocal  Railings: two rails    General Comments     Knee Comments  Girth Measurements:  Knee:  Suprapatellar region: Right at 40 2 CM and left at 42 1 CM; Mid patellar region: Right at 39 8 CM and left at 42 8 Cm; Infrapatellar region: Right at 38 4 CM and left at 41 1 Cm;         Flowsheet Rows    Flowsheet Row Most Recent Value   PT/OT G-Codes   Current Score  56   Projected Score  62   FOTO information reviewed  Yes   Assessment Type  Re-evaluation   G code set  Mobility: Walking & Moving Around   Mobility: Walking and Moving Around Current Status ()  CJ   Mobility: Walking and Moving Around Goal Status ()  CK          Precautions: Cervical and Lumbar spine pain aggravation  Objective: See treatment diary below     Manual  2/23  2/26  2/28  3/5  3/7  3/14           Kinesio taping to left knee in a "u" manner with base at paper tape tension and tails at 25 % 10 min  10 min   10min STM  10 min   10 min   NT           IASTM, B lateral hips            10 min                                                                                         Exercise Diary  2/23  2/26  2/28  3/5  3/7  3/14  3/26         Bike    10 min   10 min  10 min   10 min   10 min  10 min          Repeated seated lumbar spine flexion    20x  2x 10  NT   NT   NT           LAQ:B:    20x  20  2 5# 30x   3# 20x   3#  20x  3#x30         Hip flexion:B:    20x  20  2 5# 30x   3# 20x   3#  20x  3#x30         Piriformis stretch:B:    20sec 5x  [id number]   20sec 5x   20sec 5x   5x :20  [id number]         LTR:B:    3sec 20x   [id number]  5sec 20x   3sec 20x   20x :05  [id number]         DLS abdominal bracing    3sec 20x   [id number]  NT   NT   NT           DLS with hip flexion:B:    NT   NT  NT   NT   NT           DLS with SLR flexion:B:   20x  2 x 10  20x 2#   3# 20x   NT           Bridges    20x  2x 10  20x 2sec   20x 3sec   20x  :03           SAQ:B:    NT  2x10  30x 5sec 2#   3# 30x  3#  20x           SLR x 3:B:    20x s/l and flex  NT   2 5# 30x   3# 20x   3#  20x           Mini squats    20x  2 x 10  30x  30x  20x  2 x 10         Lunges:B:    NT   2 x 10  NT   NT   NT  2 x 10         Step ups:B:8":    NT   2 x 10  NT   30x 3#  20x  2 x 10         Step downs:B:6":    NT   NT  NT  NT   NT           Lateral step ups:B:    NT   NT  NT   NT   NT           Hip hiking:B:    NT   NT  NT   NT   NT                                                                 Modalities  2/23  2/26  2/28  3/5 3/7  3/14           MHP and TENS to lumbar spine and bilateral hip MHP    B MHP to B hips supine  MHP to bilateral hip supine x 10 min MHP   To B knees  MHP to  LB and B knees   MHP to LB & B knees

## 2018-04-02 ENCOUNTER — APPOINTMENT (OUTPATIENT)
Dept: PHYSICAL THERAPY | Age: 78
End: 2018-04-02
Payer: MEDICARE

## 2018-04-03 ENCOUNTER — APPOINTMENT (OUTPATIENT)
Dept: PHYSICAL THERAPY | Age: 78
End: 2018-04-03
Payer: MEDICARE

## 2018-04-05 ENCOUNTER — OFFICE VISIT (OUTPATIENT)
Dept: PHYSICAL THERAPY | Age: 78
End: 2018-04-05
Payer: MEDICARE

## 2018-04-05 DIAGNOSIS — M16.0 PRIMARY OSTEOARTHRITIS OF BOTH HIPS: ICD-10-CM

## 2018-04-05 DIAGNOSIS — M54.50 CHRONIC BILATERAL LOW BACK PAIN WITHOUT SCIATICA: Primary | ICD-10-CM

## 2018-04-05 DIAGNOSIS — M70.61 TROCHANTERIC BURSITIS OF RIGHT HIP: ICD-10-CM

## 2018-04-05 DIAGNOSIS — M16.11 PRIMARY OSTEOARTHRITIS OF RIGHT HIP: ICD-10-CM

## 2018-04-05 DIAGNOSIS — G89.29 CHRONIC PAIN OF LEFT KNEE: ICD-10-CM

## 2018-04-05 DIAGNOSIS — G89.29 CHRONIC BILATERAL LOW BACK PAIN WITHOUT SCIATICA: Primary | ICD-10-CM

## 2018-04-05 DIAGNOSIS — M25.562 CHRONIC PAIN OF LEFT KNEE: ICD-10-CM

## 2018-04-05 PROCEDURE — 97110 THERAPEUTIC EXERCISES: CPT | Performed by: PHYSICAL THERAPIST

## 2018-04-05 PROCEDURE — 97140 MANUAL THERAPY 1/> REGIONS: CPT | Performed by: PHYSICAL THERAPIST

## 2018-04-05 NOTE — PROGRESS NOTES
Daily Note     Today's date: 2018  Patient name: Carolann Putnam  : 1940  MRN: [id number]  Referring provider: Semaj Salvador MD  Dx:   Encounter Diagnosis     ICD-10-CM    1  Chronic bilateral low back pain without sciatica M54 5     G89 29    2  Primary osteoarthritis of right hip M16 11    3  Trochanteric bursitis of right hip M70 61    4  Primary osteoarthritis of both hips M16 0    5  Chronic pain of left knee M25 562     G89 29                   Subjective: Patient reported bilateral hip and knee pain limits prolonged standing and walking based functional activities like grocery shopping  Objective: See treatment diary below      Assessment: Tolerated treatment well  Patient exhibited good technique with therapeutic exercises  Patient presents with manual right hip PA and lateral hip mobilizations and mobilization with movements as eliminating right hip pain  But, she presents with short term right hip pain elimination with manual therapy techniques but bilateral knee pain continues to limit standing based therapeutic exercises that mimic standing based functional activities  Plan: Continue per plan of care         Precautions: Cervical and Lumbar spine pain aggravation  Objective: See treatment diary below     Manual  2/23  2/26  2/28  3/5  3/7  3/14  4/5         Kinesio taping to left knee in a "u" manner with base at paper tape tension and tails at 25 % 10 min  10 min   10min STM  10 min   10 min   NT           IASTM, B lateral hips            10 min  15 min with right hip lateral hip mobs and PA mobs                                                                                       Exercise Diary  2/23  2/26  2/28  3/5  3/7  3/14  3/26  4/5       Bike    10 min   10 min  10 min   10 min   10 min  10 min   10 min       Repeated seated lumbar spine flexion    20x  2x 10  NT   NT   NT    NT       LAQ:B:    20x  20  2 5# 30x   3# 20x   3#  20x  3#x30  3#x30       Hip flexion:B:    20x  20  2 5# 30x   3# 20x   3#  20x  3#x30  3#x30       Piriformis stretch:B:    20sec 5x  [id number]   20sec 5x   20sec 5x   5x :20  [id number]  NT       LTR:B:    3sec 20x   38ulsn2  5sec 20x   3sec 20x   20x :05  [id number]  NT       DLS abdominal bracing    3sec 20x   [id number]  NT   NT   NT    NT       DLS with hip flexion:B:    NT   NT  NT   NT   NT    NT       DLS with SLR flexion:B:   20x  2 x 10  20x 2#   3# 20x   NT    NT       Bridges    20x  2x 10  20x 2sec   20x 3sec   20x  :03    3 x 10       SAQ:B:    NT  2x10  30x 5sec 2#   3# 30x  3#  20x    NT       SLR x 3:B:    20x s/l and flex  NT   2 5# 30x   3# 20x   3#  20x    NT       Mini squats    20x  2 x 10  30x  30x  20x  2 x 10  2 x 10       Lunges:B:    NT   2 x 10  NT   NT   NT  2 x 10  2 x 10       Step ups:B:8":    NT   2 x 10  NT   30x 3#  20x  2 x 10  NT       Step downs:B:6":    NT   NT  NT  NT   NT    NT       Lateral step ups:B:    NT   NT  NT   NT   NT    NT       Hip hiking:B:    NT   NT  NT   NT   NT    NT       Hip ITB stretch:B:                20 sec x 5                                                                                                      Modalities  2/23 2/26 2/28  3/5 3/7  3/14           MHP and TENS to lumbar spine and bilateral hip MHP    B MHP to B hips supine  MHP to bilateral hip supine x 10 min MHP   To B knees  MHP to  LB and B knees   MHP to LB & B knees

## 2018-04-09 ENCOUNTER — OFFICE VISIT (OUTPATIENT)
Dept: PHYSICAL THERAPY | Age: 78
End: 2018-04-09
Payer: MEDICARE

## 2018-04-09 DIAGNOSIS — M16.11 PRIMARY OSTEOARTHRITIS OF RIGHT HIP: ICD-10-CM

## 2018-04-09 DIAGNOSIS — G89.29 CHRONIC BILATERAL LOW BACK PAIN WITHOUT SCIATICA: Primary | ICD-10-CM

## 2018-04-09 DIAGNOSIS — M16.0 PRIMARY OSTEOARTHRITIS OF BOTH HIPS: ICD-10-CM

## 2018-04-09 DIAGNOSIS — M54.50 CHRONIC BILATERAL LOW BACK PAIN WITHOUT SCIATICA: Primary | ICD-10-CM

## 2018-04-09 DIAGNOSIS — M25.562 CHRONIC PAIN OF LEFT KNEE: ICD-10-CM

## 2018-04-09 DIAGNOSIS — G89.29 CHRONIC PAIN OF LEFT KNEE: ICD-10-CM

## 2018-04-09 DIAGNOSIS — M70.61 TROCHANTERIC BURSITIS OF RIGHT HIP: ICD-10-CM

## 2018-04-09 PROCEDURE — 97140 MANUAL THERAPY 1/> REGIONS: CPT | Performed by: PHYSICAL THERAPIST

## 2018-04-09 PROCEDURE — 97110 THERAPEUTIC EXERCISES: CPT | Performed by: PHYSICAL THERAPIST

## 2018-04-09 NOTE — PROGRESS NOTES
Daily Note     Today's date: 2018  Patient name: Kj Garcia  : 1940  MRN: [id number]  Referring provider: German Byrne MD  Dx:   Encounter Diagnosis     ICD-10-CM    1  Chronic bilateral low back pain without sciatica M54 5     G89 29    2  Primary osteoarthritis of right hip M16 11    3  Trochanteric bursitis of right hip M70 61    4  Primary osteoarthritis of both hips M16 0    5  Chronic pain of left knee M25 562     G89 29                   Subjective: Patient reported bilateral hip and knee pain has decreased over time and she noted she is feeling better overall and thus we are going to d/c after next PT visit in which pt will continue with hep daily and she noted she will resume pool based exercise regime and PT created hep which will be finalized on next PT visit  Objective: See treatment diary below      Assessment: Tolerated treatment well  Patient exhibited good technique with therapeutic exercises  Patient presents with pain reduction after PT treatment plan thus pt to resume PT one more time to finalize hep and to determine that no symptoms are produced with hep  PT Hep provided in written form but pt requires one more PT treatment to provide final verbal instruction  Plan: Continue per plan of care         Precautions: Cervical and Lumbar spine pain aggravation  Objective: See treatment diary below     Manual  2/23  2/26  2/28  3/5  3/7  3/14  4/5  4/9       Kinesio taping to left knee in a "u" manner with base at paper tape tension and tails at 25 % 10 min  10 min   10min STM  10 min   10 min   NT    10 min       IASTM, B lateral hips            10 min  15 min with right hip lateral hip mobs and PA mobs  NT                                                                                     Exercise Diary  2/23  2/26  2/28  3/5  3/7  3/14  3/26  4/5  4/9     Bike    10 min   10 min  10 min   10 min   10 min  10 min   10 min  10 min     Repeated seated lumbar spine flexion    20x  2x 10  NT   NT   NT    NT  NT     LAQ:B:    20x  20  2 5# 30x   3# 20x   3#  20x  3#x30  3#x30  3#x30     Hip flexion:B:    20x  20  2 5# 30x   3# 20x   3#  20x  3#x30  3#x30  3#x30     Piriformis stretch:B:    20sec 5x  [id number]   20sec 5x   20sec 5x   5x :20  [id number]  NT  20 sec x5     LTR:B:    3sec 20x   [id number]  5sec 20x   3sec 20x   20x :05  [id number]  NT  20 sec x 5     DLS abdominal bracing    3sec 20x   [id number]  NT   NT   NT    NT  NT     DLS with hip flexion:B:    NT   NT  NT   NT   NT    NT  2 x 10     DLS with SLR flexion:B:   20x  2 x 10  20x 2#   3# 20x   NT    NT  2 x 10     Bridges    20x  2x 10  20x 2sec   20x 3sec   20x  :03    3 x 10  3 x 10     SAQ:B:    NT  2x10  30x 5sec 2#   3# 30x  3#  20x    NT  NT     SLR x 3:B:    20x s/l and flex  NT   2 5# 30x   3# 20x   3#  20x    NT  NT     Mini squats    20x  2 x 10  30x  30x  20x  2 x 10  2 x 10  2 x 10     Lunges:B:    NT   2 x 10  NT   NT   NT  2 x 10  2 x 10  2 x 10     Step ups:B:8":    NT   2 x 10  NT   30x 3#  20x  2 x 10  NT  2 x 10     Step downs:B:6":    NT   NT  NT  NT   NT    NT  NT     Lateral step ups:B:    NT   NT  NT   NT   NT    NT  NT     Hip hiking:B:    NT   NT  NT   NT   NT    NT  NT     Hip ITB stretch:B:                20 sec x 5  20 sec x 5                                                                                                    Modalities  2/23 2/26  2/28  3/5 3/7  3/14           MHP and TENS to lumbar spine and bilateral hip MHP    B MHP to B hips supine  MHP to bilateral hip supine x 10 min MHP   To B knees  MHP to  LB and B knees   MHP to LB & B knees

## 2018-04-12 ENCOUNTER — OFFICE VISIT (OUTPATIENT)
Dept: PHYSICAL THERAPY | Age: 78
End: 2018-04-12
Payer: MEDICARE

## 2018-04-12 DIAGNOSIS — M16.11 PRIMARY OSTEOARTHRITIS OF RIGHT HIP: ICD-10-CM

## 2018-04-12 DIAGNOSIS — G89.29 CHRONIC BILATERAL LOW BACK PAIN WITHOUT SCIATICA: Primary | ICD-10-CM

## 2018-04-12 DIAGNOSIS — M16.0 PRIMARY OSTEOARTHRITIS OF BOTH HIPS: ICD-10-CM

## 2018-04-12 DIAGNOSIS — M70.61 TROCHANTERIC BURSITIS OF RIGHT HIP: ICD-10-CM

## 2018-04-12 DIAGNOSIS — M54.50 CHRONIC BILATERAL LOW BACK PAIN WITHOUT SCIATICA: Primary | ICD-10-CM

## 2018-04-12 DIAGNOSIS — G89.29 CHRONIC PAIN OF LEFT KNEE: ICD-10-CM

## 2018-04-12 DIAGNOSIS — M25.562 CHRONIC PAIN OF LEFT KNEE: ICD-10-CM

## 2018-04-12 PROCEDURE — G8979 MOBILITY GOAL STATUS: HCPCS | Performed by: PHYSICAL THERAPIST

## 2018-04-12 PROCEDURE — 97110 THERAPEUTIC EXERCISES: CPT | Performed by: PHYSICAL THERAPIST

## 2018-04-12 PROCEDURE — G8980 MOBILITY D/C STATUS: HCPCS | Performed by: PHYSICAL THERAPIST

## 2018-04-12 NOTE — PROGRESS NOTES
PT Evaluation / Re-Evaluation / Discharge    Today's date: 2018  Patient name: Ford Lui  : 1940  MRN: [id number]  Referring provider: Neelam Betancourt MD  Dx:   Encounter Diagnosis     ICD-10-CM    1  Chronic bilateral low back pain without sciatica M54 5     G89 29    2  Primary osteoarthritis of right hip M16 11    3  Trochanteric bursitis of right hip M70 61    4  Primary osteoarthritis of both hips M16 0    5  Chronic pain of left knee M25 562     G89 29                   Assessment  Impairments: abnormal gait, abnormal or restricted ROM, impaired physical strength and pain with function    Assessment details: PT IE due to functional mobility due to lumbar spine, bilateral hip and left knee pain that limits all weight baring functional activities  PT Reassessment: 2018  Understanding of Dx/Px/POC: excellent   Prognosis: good  Prognosis details: Patient presents with the following progress since onset of PT : decrease in multiple region pain, increase in bilateral ue rom and strength, lumbar spine rom, gait and stair improvements and functional progress  Thus, due to functional and impairment progress pt agrees with PT POC to d/c to hep  Goals  Short Term goals - 3 to 4 weeks  1  Patient will be independent HEP   MET  2   Patient will report a 25 - 50% decrease in pain complaints  MET  3  Increase strength 1/2 grade  MET  4  Increase ROM 5-10 degrees  MET    Long Term goals - 6 to 8 weeks  1  Patient will report elimination of pain complaints  Partially MET  2   Patient will return to all house hold functional activities without restriction  Partially MET  3   Patient will return to all recreational activities without restriction  Partially MET  4   ROM WFL  Partially MET  5   Strength 5/5  Partially MET  6   Patient to reported 50 % or greater reduction in TTP  Partially MET    7   Patient to reported ambulation and stair climbing 50 % or greater with regards to improvements  Partially MET  8   Patient to reported self foto computerized functional index > 10 points  Partially MET  Plan  Patient would benefit from: skilled PT  Planned modality interventions: TENS, thermotherapy: hydrocollator packs, ultrasound and unattended electrical stimulation  Planned therapy interventions: joint mobilization, manual therapy, ADL retraining, ADL training, motor coordination training, muscle pump exercises, balance/weight bearing training, balance, neuromuscular re-education, patient education, postural training, self care, strengthening, stretching, coordination, flexibility, therapeutic activities, therapeutic exercise, functional ROM exercises, gait training, graded activity, graded exercise, graded motor, home exercise program and therapeutic training  Frequency: 2x week  Duration in weeks: 8  Treatment plan discussed with: patient        Subjective Evaluation    History of Present Illness  Mechanism of injury: Patient reported multiple region pain has persisted for several years that limits prolonged standing and walking based functional activities  Patient noted history of bilateral hip injection with one in left and multiple times on right  Patient noted car and transfers limited by bilateral le weakness, difficulty with long term walking are limited by bilateral hip and bilateral knee region  Patient noted she does swim one time per week  Patient reported stiffness in the am limits all transfers, walking that effects bilateral hip and knees  Patient noted stair climbing remains difficult in the am more than once she is " warm up"  Patient noted her left knee pain and swelling  Plus, she noted she will have calf pain  Patient noted cervical spine pain is aggravate with prolonged extension based movements  Patient noted hep and hot shower is effective in cervical spine pain reduction    Patient noted lumbar spine pain is aggravated with walking > static standing and she denies lumbar spine aggravation with sitting  Patient reported lumbar spine pain at worst 8 at 10 and least at 0 of 10 at least, bilateral hip and knee pain at least at 3 of 10 and worst at 8 of 10  Patient noted she will intermittently take Tylenol or Aleve for pain reduction  PT Reassessment: 18  Patient reported bilateral hip, lumbar spine and knee pain reduction  Patient noted due to functional and impairment progress she agrees with D/C to hep  Patient reported intermittent sleep deficits as well as pain typically worse at rest     Quality of life: good    Pain  At best pain ratin  At worst pain ratin  Quality: dull ache  Relieving factors: heat  Aggravating factors: standing, walking and lifting  Progression: worsening    Social Support  Stairs in house: yes   Lives in: multiple-level home  Lives with: spouse      Diagnostic Tests  MRI studies: abnormal  Patient Goals  Patient goals for therapy: decreased edema, decreased pain, increased motion and increased strength  Patient goal: Patient's goal": to get more active again"  Objective     Tenderness     Additional Tenderness Details  Patient is - TTP at lumbar spine erector spinae musculature  Patient is + TTP at bilateral lateral hip region at greater trochanter region at minimal levels and medial and lateral left knee at minimal  levels      Active Range of Motion     Lumbar   Flexion: 106 degrees   Extension: 25 degrees   Left lateral flexion: 26 degrees   Right lateral flexion: 32 degrees   Left rotation: 55 degrees   Right rotation: 58 degrees   Left Hip   Flexion: 112 degrees with pain  Abduction: 30 degrees     Right Hip   Flexion: 114 degrees with pain  Abduction: 32 degrees with pain  Left Knee   Flexion: 120 degrees   Extension: -2 degrees   Extensor la degrees     Right Knee   Flexion: 126 degrees   Extension: -2 degrees   Extensor la degrees   Left Ankle/Foot   Dorsiflexion (ke): 10 degrees   Plantar flexion: 60 degrees     Right Ankle/Foot   Dorsiflexion (ke): 10 degrees   Plantar flexion: 56 degrees     Additional Active Range of Motion Details  SLR on right at 90 degrees and left at 90 degrees    Strength/Myotome Testing     Left Hip   Planes of Motion   Flexion: 5  Extension: 5  Abduction: 4+  Adduction: 5    Right Hip   Planes of Motion   Flexion: 5  Extension: 5  Abduction: 4+  Adduction: 5    Left Knee   Flexion: 4+  Prone flexion: 5    Right Knee   Flexion: 4+  Prone flexion: 5    Left Ankle/Foot   Dorsiflexion: 5  Plantar flexion: 5    Right Ankle/Foot   Dorsiflexion: 5  Plantar flexion: 5    Ambulation     Ambulation: Level Surfaces   Ambulation without assistive device: independent    Additional Level Surfaces Ambulation Details  Patient ambulates with left knee based antalgic gait pattern of decrease in left stance phase and right swing phase  Ambulation: Stairs   Ascend stairs: independent  Pattern: reciprocal  Railings: two rails  Descend stairs: independent  Pattern: non-reciprocal  Railings: two rails    General Comments     Knee Comments  Girth Measurements:  Knee:  Suprapatellar region: Right at 40 2 CM and left at 42 1 CM; Mid patellar region: Right at 39 8 CM and left at 42 8 Cm; Infrapatellar region: Right at 38 4 CM and left at 41 1 Cm;         Flowsheet Rows    Flowsheet Row Most Recent Value   PT/OT G-Codes   Current Score  63   Projected Score  55   FOTO information reviewed  Yes   Assessment Type  Discharge   G code set  Mobility: Walking & Moving Around   Mobility: Walking and Moving Around Goal Status ()  CJ   Mobility: Walking and Moving Around Discharge Status ()  CK          Precautions: Cervical and Lumbar spine pain aggravation  Objective: See treatment diary below     Manual  2/23  2/26  2/28  3/5  3/7  3/14  4/5  4/9       Kinesio taping to left knee in a "u" manner with base at paper tape tension and tails at 25 % 10 min  10 min   10min STM  10 min   10 min   NT    10 min       IASTM, B lateral hips            10 min  15 min with right hip lateral hip mobs and PA mobs  NT                                                                                     Exercise Diary  2/23  2/26  2/28  3/5  3/7  3/14  3/26  4/5  4/9  4/12   Bike    10 min   10 min  10 min   10 min   10 min  10 min   10 min  10 min  10 min   Repeated seated lumbar spine flexion    20x  2x 10  NT   NT   NT    NT  NT  NT   LAQ:B:    20x  20  2 5# 30x   3# 20x   3#  20x  3#x30  3#x30  3#x30  3#x30   Hip flexion:B:    20x  20  2 5# 30x   3# 20x   3#  20x  3#x30  3#x30  3#x30  NT   Piriformis stretch:B:    20sec 5x  [id number]   20sec 5x   20sec 5x   5x :20  [id number]  NT  20 sec x5  20 sec x 5   LTR:B:    3sec 20x   [id number]  5sec 20x   3sec 20x   20x :05  [id number]  NT  20 sec x 5  20 sec x 5   DLS abdominal bracing    3sec 20x   [id number]  NT   NT   NT    NT  NT  NT   DLS with hip flexion:B:    NT   NT  NT   NT   NT    NT  2 x 10  3 x 10   DLS with SLR flexion:B:   20x  2 x 10  20x 2#   3# 20x   NT    NT  2 x 10  3 x 10   Bridges    20x  2x 10  20x 2sec   20x 3sec   20x  :03    3 x 10  3 x 10  3 x 10   SAQ:B:    NT  2x10  30x 5sec 2#   3# 30x  3#  20x    NT  NT  NT   SLR x 3:B:    20x s/l and flex  NT   2 5# 30x   3# 20x   3#  20x    NT  NT     Mini squats    20x  2 x 10  30x  30x  20x  2 x 10  2 x 10  2 x 10  2 x 10   Lunges:B:    NT   2 x 10  NT   NT   NT  2 x 10  2 x 10  2 x 10  2 x 10   Step ups:B:8":    NT   2 x 10  NT   30x 3#  20x  2 x 10  NT  2 x 10  2 x 10   Step downs:B:6":    NT   NT  NT  NT   NT    NT  NT  NT   Lateral step ups:B:    NT   NT  NT   NT   NT    NT  NT  NT   Hip hiking:B:    NT   NT  NT   NT   NT    NT  NT     Hip ITB stretch:B:                20 sec x 5  20 sec x 5  20 sec x 5    LTR:B:                   10 sec x 5                                                                                                                                 Modalities  2/23  2/26  2/28  3/5 3/7  3/14           P and TENS to lumbar spine and bilateral hip MHP    B MHP to B hips supine  MHP to bilateral hip supine x 10 min MHP   To B knees  MHP to  LB and B knees   MHP to LB & B knees

## 2018-07-18 ENCOUNTER — TREATMENT (OUTPATIENT)
Dept: FAMILY MEDICINE CLINIC | Facility: CLINIC | Age: 78
End: 2018-07-18

## 2018-07-18 ENCOUNTER — TELEPHONE (OUTPATIENT)
Dept: FAMILY MEDICINE CLINIC | Facility: CLINIC | Age: 78
End: 2018-07-18

## 2018-07-18 DIAGNOSIS — C50.919 MALIGNANT NEOPLASM OF FEMALE BREAST, UNSPECIFIED ESTROGEN RECEPTOR STATUS, UNSPECIFIED LATERALITY, UNSPECIFIED SITE OF BREAST (HCC): Primary | ICD-10-CM

## 2018-07-18 NOTE — TELEPHONE ENCOUNTER
Pt needs order for 6 mastectomy bras and one prothesis left side breast   Fax to [address]  [phone]  dx  :[phone] 12   [id number]

## 2018-08-13 ENCOUNTER — TELEPHONE (OUTPATIENT)
Dept: FAMILY MEDICINE CLINIC | Facility: CLINIC | Age: 78
End: 2018-08-13

## 2018-08-13 DIAGNOSIS — Z12.31 ENCOUNTER FOR SCREENING MAMMOGRAM FOR BREAST CANCER: Primary | ICD-10-CM

## 2018-08-13 NOTE — TELEPHONE ENCOUNTER
Pt is calling because she need a Rx for a mammogram screening 3d she will like to pick this up can you please call her when its ready thanks

## 2018-08-17 ENCOUNTER — TELEPHONE (OUTPATIENT)
Dept: FAMILY MEDICINE CLINIC | Facility: CLINIC | Age: 78
End: 2018-08-17

## 2018-08-23 ENCOUNTER — OFFICE VISIT (OUTPATIENT)
Dept: FAMILY MEDICINE CLINIC | Facility: CLINIC | Age: 78
End: 2018-08-23
Payer: MEDICARE

## 2018-08-23 VITALS
SYSTOLIC BLOOD PRESSURE: 130 MMHG | HEIGHT: 67 IN | TEMPERATURE: 97.1 F | BODY MASS INDEX: 26.12 KG/M2 | WEIGHT: 166.4 LBS | DIASTOLIC BLOOD PRESSURE: 80 MMHG

## 2018-08-23 DIAGNOSIS — R31.29 MICROSCOPIC HEMATURIA: ICD-10-CM

## 2018-08-23 DIAGNOSIS — Z13.220 SCREENING CHOLESTEROL LEVEL: ICD-10-CM

## 2018-08-23 DIAGNOSIS — K57.92 DIVERTICULITIS: Primary | ICD-10-CM

## 2018-08-23 DIAGNOSIS — C50.919 MALIGNANT NEOPLASM OF FEMALE BREAST, UNSPECIFIED ESTROGEN RECEPTOR STATUS, UNSPECIFIED LATERALITY, UNSPECIFIED SITE OF BREAST (HCC): ICD-10-CM

## 2018-08-23 LAB
CHOLEST SERPL-MCNC: 198 MG/DL (ref 50–200)
HDLC SERPL-MCNC: 70 MG/DL (ref 40–60)
LDLC SERPL CALC-MCNC: 115 MG/DL (ref 0–100)
NONHDLC SERPL-MCNC: 128 MG/DL
TRIGL SERPL-MCNC: 64 MG/DL

## 2018-08-23 PROCEDURE — G0009 ADMIN PNEUMOCOCCAL VACCINE: HCPCS | Performed by: FAMILY MEDICINE

## 2018-08-23 PROCEDURE — G0438 PPPS, INITIAL VISIT: HCPCS | Performed by: FAMILY MEDICINE

## 2018-08-23 PROCEDURE — 36415 COLL VENOUS BLD VENIPUNCTURE: CPT | Performed by: FAMILY MEDICINE

## 2018-08-23 PROCEDURE — 80061 LIPID PANEL: CPT | Performed by: FAMILY MEDICINE

## 2018-08-23 PROCEDURE — 99214 OFFICE O/P EST MOD 30 MIN: CPT | Performed by: FAMILY MEDICINE

## 2018-08-23 PROCEDURE — 90670 PCV13 VACCINE IM: CPT | Performed by: FAMILY MEDICINE

## 2018-08-23 RX ORDER — AMOXICILLIN AND CLAVULANATE POTASSIUM 875; 125 MG/1; MG/1
1 TABLET, FILM COATED ORAL EVERY 12 HOURS SCHEDULED
Qty: 20 TABLET | Refills: 0 | Status: SHIPPED | OUTPATIENT
Start: 2018-08-23 | End: 2018-09-02

## 2018-08-23 NOTE — PROGRESS NOTES
Assessment/Plan:    No problem-specific Assessment & Plan notes found for this encounter  Diagnoses and all orders for this visit:    Diverticulitis  Comments:  had yrs ago ; ?? flare lately     likely gastroenteritis   Orders:  -     PNEUMOCOCCAL CONJUGATE VACCINE 13-VALENT GREATER THAN 6 MONTHS  -     amoxicillin-clavulanate (AUGMENTIN) 875-125 mg per tablet; Take 1 tablet by mouth every 12 (twelve) hours for 10 days    Screening cholesterol level  -     Lipid panel    Microscopic hematuria    Malignant neoplasm of female breast, unspecified estrogen receptor status, unspecified laterality, unspecified site of breast ([facility] [address] )  -     Mammo screening bilateral w cad; Future          Subjective:      Patient ID: Arturo Azevedo is a 77 y o  female  Patient comes for regular checkup today  She has several issues that we are addressing  As well we will update her annual wellness visit for Medicare  The following portions of the patient's history were reviewed and updated as appropriate: allergies, current medications, past family history, past medical history, past social history, past surgical history and problem list     Review of Systems   Constitutional: Negative for activity change and appetite change  HENT: Negative for voice change  Eyes: Negative for visual disturbance  Respiratory: Negative for chest tightness and shortness of breath  Cardiovascular: Negative for chest pain, palpitations and leg swelling  Gastrointestinal: Negative for abdominal pain, blood in stool, constipation and diarrhea  Genitourinary: Negative for dysuria, vaginal bleeding and vaginal discharge  Skin: Negative for rash  Neurological: Negative for dizziness  Psychiatric/Behavioral: Negative for dysphoric mood           Objective:  Vitals:    08/23/18 0926   BP: 130/80   BP Location: Left arm   Patient Position: Sitting   Cuff Size: Adult   Temp: (!) 97 1 °F (36 2 °C)   Weight: 75 5 kg (166 lb 6 4 oz) Height: 5' 7" (1 702 m)      Physical Exam   Constitutional: She appears well-developed and well-nourished  HENT:   Head: Normocephalic and atraumatic  Eyes: Conjunctivae are normal    Neck: Neck supple  No thyromegaly present  Cardiovascular: Normal rate, regular rhythm, normal heart sounds and intact distal pulses  No murmur heard  Pulmonary/Chest: Effort normal and breath sounds normal  No respiratory distress  Abdominal: Soft  Bowel sounds are normal  There is no tenderness  Musculoskeletal: She exhibits no edema  Lymphadenopathy:     She has no cervical adenopathy  Skin: Skin is warm and dry  Psychiatric: She has a normal mood and affect  Her behavior is normal        Assessment and Plan:    Problem List Items Addressed This Visit     Microscopic hematuria    Diverticulitis - Primary    Relevant Medications    amoxicillin-clavulanate (AUGMENTIN) 875-125 mg per tablet    Other Relevant Orders    PNEUMOCOCCAL CONJUGATE VACCINE 13-VALENT GREATER THAN 6 MONTHS    Screening cholesterol level    Relevant Orders    Lipid panel    Malign[facility] ([facility] )    Relevant Orders    Mammo screening bilateral w cad        Health Maintenance Due   Topic Date Due    DTaP,Tdap,and Td Vaccines (1 - Tdap) 08/18/1960    Pneumococcal PPSV23/PCV13 65+ Years / High and Highest Risk (2 of 2 - PCV13) 12/20/2005         HPI:  Irvine Phalen is a 77 y o  female here for her Initial Wellness Visit      Patient Active Problem List   Diagnosis    Microscopic hematuria    Increased frequency of urination    Diverticulitis    Screening cholesterol level    Malignant neoplasm of female breast [facility])     Past Medical History:   Diagnosis Date    Bladder polyps     Breast cancer ([facility] [address] )     Left    Cholelithiasis     GERD (gastroesophageal reflux disease)     Hematuria      Past Surgical History:   Procedure Laterality Date    APPENDECTOMY      BREAST SURGERY Left     Mastectomy    CHOLECYSTECTOMY      CYSTOSCOPY       Family History   Problem Relation Age of Onset    Lymphoma Mother     Heart disease Father     Pancreatic cancer Sister      History   Smoking Status    Never Smoker   Smokeless Tobacco    Never Used     Comment: Per [employer]: Yes, cigarettes     History   Alcohol Use    Yes     Comment: SOCIAL DRINKER      History   Drug Use No       Current Outpatient Prescriptions   Medication Sig Dispense Refill    Ascorbic Acid (VITAMIN C) 1000 MG tablet Take by mouth      B-Complex-C CAPS Take by mouth      Misc Natural Products (OSTEO BI-FLEX ADV JOINT SHIELD) TABS Take by mouth      Multiple Vitamin (MULTI-VITAMIN DAILY) TABS Take by mouth      amoxicillin-clavulanate (AUGMENTIN) 875-125 mg per tablet Take 1 tablet by mouth every 12 (twelve) hours for 10 days 20 tablet 0     No current facility-administered medications for this visit  Allergies   Allergen Reactions    No Active Allergies      Immunization History   Administered Date(s) Administered    Influenza 11/16/2005, 11/04/2016    Influenza Split High Dose Preservative Free IM 11/04/2016    Pneumococcal Polysaccharide PPV23 12/20/2004    Zoster 03/10/2011, 07/01/2013       Patient Care Team:  Maritza Gonzalez MD as PCP - General  Lakhwinder Ames PA-C      Other docs : eye/ rheum/ dental     Medicare Screening Tests and Risk Assessments:      Broken Bones/Falls:     Fall Risk Assessment:    In the past year, patient has experienced: visual disturbance    Preventative Screening/Counseling:      Cardiovascular:      General: Screening Not Indicated      Counseling: Healthy Diet      Comments: Excellent exercise ; low c/v risk         Diabetes:      General: Screening Not Indicated          Colorectal Cancer:      General: Screening Current          Breast Cancer:      General: Screening Current          Cervical Cancer:      General: Screening Not Indicated          Osteoporosis:      General: Risks and Benefits Discussed      Counseling: Regular Weightbearing Exercise      Comments: Call if you want to do after your trip         AAA:      General: Screening Not Indicated          Glaucoma:      General: Screening Current          HIV:      General: Screening Not Indicated          Hepatitis C:      General: Screening Not Indicated        Advanced Directives:   patient has an advanced directive  Immunizations:      Shingrix: Risks & Benefits Discussed      Other Preventative Counseling (Non-Medicare):  Alcohol Use and Car/seat belt/driving safety reviewed              Assessment and Plan:    Problem List Items Addressed This Visit     Microscopic hematuria    Diverticulitis - Primary    Relevant Medications    amoxicillin-clavulanate (AUGMENTIN) 875-125 mg per tablet    Other Relevant Orders    PNEUMOCOCCAL CONJUGATE VACCINE 13-VALENT GREATER THAN 6 MONTHS (Completed)    Screening cholesterol level    Relevant Orders    Lipid panel (Completed)    Malignant neoplasm of female breast ([facility] )    Relevant Orders    Mammo screening bilateral w cad        Health Maintenance Due   Topic Date Due    DTaP,Tdap,and Td Vaccines (1 - Tdap) 08/18/1960         HPI:  Celso Damian is a 77 y o  female here for her Initial Wellness Visit      Patient Active Problem List   Diagnosis    Microscopic hematuria    Increased frequency of urination    Diverticulitis    Screening cholesterol level    Malignant neoplasm of female breast [facility])     Past Medical History:   Diagnosis Date    Bladder polyps     [facility]     Left    Cholelithiasis     GERD (gastroesophageal reflux disease)     Hematuria      Past Surgical History:   Procedure Laterality Date    APPENDECTOMY      BREAST SURGERY Left     Mastectomy    CHOLECYSTECTOMY      CYSTOSCOPY       Family History   Problem Relation Age of Onset    Lymphoma Mother     Heart disease Father     Pancreatic cancer Sister      History   Smoking Status    Never Smoker Smokeless Tobacco    Never Used     Comment: Per [employer]: Yes, cigarettes     History   Alcohol Use    Yes     Comment: SOCIAL DRINKER      History   Drug Use No       Current Outpatient Prescriptions   Medication Sig Dispense Refill    Ascorbic Acid (VITAMIN C) 1000 MG tablet Take by mouth      B-Complex-C CAPS Take by mouth      Misc Natural Products (OSTEO BI-FLEX ADV JOINT SHIELD) TABS Take by mouth      Multiple Vitamin (MULTI-VITAMIN DAILY) TABS Take by mouth      amoxicillin-clavulanate (AUGMENTIN) 875-125 mg per tablet Take 1 tablet by mouth every 12 (twelve) hours for 10 days 20 tablet 0     No current facility-administered medications for this visit        Allergies   Allergen Reactions    No Active Allergies      Immunization History   Administered Date(s) Administered    Influenza 11/16/2005, 11/04/2016    Influenza Split High Dose Preservative Free IM 11/04/2016    Pneumococcal Conjugate 13-Valent 08/23/2018    Pneumococcal Polysaccharide PPV23 12/20/2004    Zoster 03/10/2011, 07/01/2013       Patient Care Team:  Maggi Mitchell MD as PCP - General  Janene Novak PA-C    Medicare Screening Tests and Risk Assessments:  Medicare Annual Wellness Visit

## 2018-08-23 NOTE — PROGRESS NOTES
Assessment and Plan:    Problem List Items Addressed This Visit     None        Health Maintenance Due   Topic Date Due    DTaP,Tdap,and Td Vaccines (1 - Tdap) 08/18/1960    Pneumococcal PPSV23/PCV13 65+ Years / High and Highest Risk (1 of 2 - PCV13) 08/18/2004         HPI:  Celso Damian is a 77 y o  female here for her     Patient Active Problem List   Diagnosis    Microscopic hematuria    Increased frequency of urination     Past Medical History:   Diagnosis Date    Bladder polyps     Breast cancer ([address] )     Left    Cholelithiasis     GERD (gastroesophageal reflux disease)     Hematuria      Past Surgical History:   Procedure Laterality Date    APPENDECTOMY      BREAST SURGERY Left     Mastectomy    CHOLECYSTECTOMY      CYSTOSCOPY       Family History   Problem Relation Age of Onset    Lymphoma Mother     Heart disease Father     Pancreatic cancer Sister      History   Smoking Status    Never Smoker   Smokeless Tobacco    Never Used     Comment: Per [employer]: Yes, cigarettes     History   Alcohol Use    Yes     Comment: SOCIAL DRINKER      History   Drug Use No       Current Outpatient Prescriptions   Medication Sig Dispense Refill    Ascorbic Acid (VITAMIN C) 1000 MG tablet Take by mouth      B-Complex-C CAPS Take by mouth      Misc Natural Products (OSTEO BI-FLEX ADV JOINT SHIELD) TABS Take by mouth      Multiple Vitamin (MULTI-VITAMIN DAILY) TABS Take by mouth       No current facility-administered medications for this visit        Allergies   Allergen Reactions    No Active Allergies      Immunization History   Administered Date(s) Administered    Influenza 11/16/2005, 11/04/2016    Influenza Split High Dose Preservative Free IM 11/04/2016    Pneumococcal Polysaccharide PPV23 12/20/2004    Zoster 03/10/2011       Patient Care Team:  Jaci Siddiqui MD as PCP - General  Norma Becerra PA-C    Medicare Screening Tests and Risk Assessments:      Health Risk Assessment:  Patient rates overall health as good  Eyesight was rated as Same  Hearing was rated as Same  Patient feels that their emotional and mental health rating is Same  Pain experienced by patient in the last 7 days has been Some  Emotional/Mental Health:  Patient has been feeling nervous/anxious  PHQ-9 Depression Screening:    Frequency of the following problems over the past two weeks:      1  Little interest or pleasure in doing things: 0 - not at all      2  Feeling down, depressed, or hopeless: 0 - not at all  PHQ-2 Score: 0          Broken Bones/Falls: Fall Risk Assessment:    In the past year, patient has experienced: No history of falling in past year          Bladder/Bowel:  Patient has not leaked urine accidently in the last six months  Patient reports no loss of bowel control  Home Safety:  Patient does not have trouble with stairs inside or outside of their home  Patient currently reports that there are working smoke alarms, working carbon monoxide detectors  Nutrition:  Current diet: Regular with servings of the following:    Medications:  Patient is currently taking over-the-counter supplements  Patient is able to manage medications  Lifestyle Choices:  Patient reports no tobacco use  Patient has not smoked or used tobacco in the past   Patient reports alcohol use  Patient drives a vehicle  Patient wears seat belt          Activities of Daily Living:  Can get out of bed by his or her self, able to dress self, able to make own meals, able to do own shopping, able to bathe self, can do own laundry/housekeeping, can manage own money, pay bills and track expenses    Previous Hospitalizations:  No hospitalization or ED visit in past 12 months

## 2018-08-29 ENCOUNTER — TELEPHONE (OUTPATIENT)
Dept: UROLOGY | Facility: AMBULATORY SURGERY CENTER | Age: 78
End: 2018-08-29

## 2018-08-29 ENCOUNTER — TELEPHONE (OUTPATIENT)
Dept: FAMILY MEDICINE CLINIC | Facility: CLINIC | Age: 78
End: 2018-08-29

## 2018-08-29 NOTE — TELEPHONE ENCOUNTER
Pt stopped in requesting a medication be prescribed to her to for urinary frequency  Pt was offered appointment but refused please advise pt sees Elizabeth Mann thank you

## 2018-08-29 NOTE — TELEPHONE ENCOUNTER
Patient walked into office today requesting a medication to calm her bladder  Patient states she was offered it prior but declined  She would now like to try it  Explained to patient this needs to be sent to provider to approve of which medication is appropriate since she has not been here since Feb 2018, and there was only mention of anticholinergic  Patient wanted to have script right away  Explained RN cannot approve and print out scripts for patients, this will need to be addressed by provider and addressed later today or tomorrow  Patient became upset and states "I thought this was your specialty, I'm a pharmacist, 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

## 2018-10-16 ENCOUNTER — HOSPITAL ENCOUNTER (OUTPATIENT)
Dept: RADIOLOGY | Facility: HOSPITAL | Age: 78
Discharge: HOME | End: 2018-10-16
Payer: MEDICARE

## 2018-10-16 DIAGNOSIS — C50.919 MALIGNANT NEOPLASM OF FEMALE BREAST, UNSPECIFIED ESTROGEN RECEPTOR STATUS, UNSPECIFIED LATERALITY, UNSPECIFIED SITE OF BREAST (HCC): ICD-10-CM

## 2018-10-16 PROCEDURE — 77067 SCR MAMMO BI INCL CAD: CPT

## 2018-10-17 ENCOUNTER — TELEPHONE (OUTPATIENT)
Dept: FAMILY MEDICINE CLINIC | Facility: CLINIC | Age: 78
End: 2018-10-17

## 2018-10-17 NOTE — TELEPHONE ENCOUNTER
Patient called she left a message on physican line to cancel her appointment on 10/18/18 with dr Adonay Escamilla so the appointment was cancelled per her message left on physican line

## 2019-02-04 ENCOUNTER — APPOINTMENT (OUTPATIENT)
Dept: LAB | Facility: HOSPITAL | Age: 79
End: 2019-02-04
Payer: MEDICARE

## 2019-02-04 ENCOUNTER — TRANSCRIBE ORDERS (OUTPATIENT)
Dept: LAB | Facility: HOSPITAL | Age: 79
End: 2019-02-04

## 2019-02-04 DIAGNOSIS — Z00.00 ROUTINE GENERAL MEDICAL EXAMINATION AT A HEALTH CARE FACILITY: Primary | ICD-10-CM

## 2019-02-04 DIAGNOSIS — R31.29 MICROSCOPIC HEMATURIA: ICD-10-CM

## 2019-02-04 LAB
BACTERIA UR QL AUTO: ABNORMAL /HPF
BILIRUB UR QL STRIP: NEGATIVE
CLARITY UR: CLEAR
COLOR UR: YELLOW
GLUCOSE UR STRIP-MCNC: NEGATIVE MG/DL
HGB UR QL STRIP.AUTO: ABNORMAL
HYALINE CASTS #/AREA URNS LPF: ABNORMAL /LPF
KETONES UR STRIP-MCNC: NEGATIVE MG/DL
LEUKOCYTE ESTERASE UR QL STRIP: NEGATIVE
NITRITE UR QL STRIP: NEGATIVE
NON-SQ EPI CELLS URNS QL MICRO: ABNORMAL /HPF
PH UR STRIP.AUTO: 5.5 [PH] (ref 4.5–8)
PROT UR STRIP-MCNC: NEGATIVE MG/DL
RBC #/AREA URNS AUTO: ABNORMAL /HPF
SP GR UR STRIP.AUTO: 1.02 (ref 1–1.03)
UROBILINOGEN UR QL STRIP.AUTO: 0.2 E.U./DL
WBC #/AREA URNS AUTO: ABNORMAL /HPF

## 2019-02-04 PROCEDURE — 88112 CYTOPATH CELL ENHANCE TECH: CPT | Performed by: PATHOLOGY

## 2019-02-04 PROCEDURE — 81001 URINALYSIS AUTO W/SCOPE: CPT

## 2019-02-18 ENCOUNTER — OFFICE VISIT (OUTPATIENT)
Dept: UROLOGY | Facility: AMBULATORY SURGERY CENTER | Age: 79
End: 2019-02-18
Payer: MEDICARE

## 2019-02-18 VITALS
HEIGHT: 67 IN | WEIGHT: 168.2 LBS | DIASTOLIC BLOOD PRESSURE: 54 MMHG | SYSTOLIC BLOOD PRESSURE: 92 MMHG | HEART RATE: 88 BPM | BODY MASS INDEX: 26.4 KG/M2

## 2019-02-18 DIAGNOSIS — R35.0 URINARY FREQUENCY: Primary | ICD-10-CM

## 2019-02-18 DIAGNOSIS — R31.29 MICROSCOPIC HEMATURIA: ICD-10-CM

## 2019-02-18 LAB — POST-VOID RESIDUAL VOLUME, ML POC: 32 ML

## 2019-02-18 PROCEDURE — 99213 OFFICE O/P EST LOW 20 MIN: CPT | Performed by: NURSE PRACTITIONER

## 2019-02-18 PROCEDURE — 51798 US URINE CAPACITY MEASURE: CPT | Performed by: NURSE PRACTITIONER

## 2019-02-18 NOTE — PROGRESS NOTES
2/18/2019    [facility]  1940  [id number]        Assessment  -Microscopic hematuria s/p negative urethral and bladder biopsy (3/2009)  -Atypical cytology  -Urinary frequency    Discussion/Plan  Minnie Hammans is a 77 y o  female being managed by Dr Devendra Goode  -PVR is 32cc  Patient continues to report episodes of daytime frequency, and urgency, however she defers any medication management  We reviewed dietary and behavioral modifications such as avoiding bladder irritants and increasing water intake  -We reviewed results of recent UA and urine cytology  Cytology results continue to show rare atypical urothelial cells, and UA identified presence of 2-4 RBC  Patient is status post negative urethral and bladder biopsy in 2009  Findings are unchanged from prior results  We discussed follow up in 1 year with repeat urine testing, however patient does not feel this is necessary, and states she will follow up on as needed basis  Patient will continue to see PCP on yearly basis, and will call our office with any issues  -All questions answered, patients agree with plan     History of Present Illness  77 y o  female with a history of microscopic hematuria, abnormal urine cytology, and urinary frequency presents today for follow up  Patient underwent urethral and bladder biopsy in 2009 after urine cytology revealed rare atypical urothelial cells and microscopic hematuria  She continues to report urinary urgency, frequency, and nocturia  Patient has previously deferred medication management such as anticholinergic  She denies any episodes of gross hematuria or dysuria  No overall changes to health since last office visit  Review of Systems  Review of Systems   Constitutional: Negative  HENT: Negative  Respiratory: Negative  Cardiovascular: Negative  Gastrointestinal: Negative  Genitourinary: Positive for frequency   Negative for decreased urine volume, difficulty urinating, dysuria, flank pain, hematuria and urgency  Musculoskeletal: Negative  Skin: Negative  Neurological: Negative  Psychiatric/Behavioral: Negative          Past Medical History  Past Medical History:   Diagnosis Date    Bladder polyps     Breast cancer ([address] )     Left    Cholelithiasis     GERD (gastroesophageal reflux disease)     Hematuria        Past Social History  Past Surgical History:   Procedure Laterality Date    APPENDECTOMY      BREAST SURGERY Left     Mastectomy    CHOLECYSTECTOMY      CYSTOSCOPY         Past Family History  Family History   Problem Relation Age of Onset    Lymphoma Mother     Heart disease Father     Pancreatic cancer Sister        Past Social history  Social History     Socioeconomic History    Marital status: /Civil Union     Spouse name: Not on file    Number of children: Not on file    Years of education: Not on file    Highest education level: Not on file   Occupational History    Not on file   Social Needs    Financial resource strain: Not on file    Food insecurity:     Worry: Not on file     Inability: Not on file    Transportation needs:     Medical: Not on file     Non-medical: Not on file   Tobacco Use    Smoking status: Never Smoker    Smokeless tobacco: Never Used    Tobacco comment: Per [employer]: Yes, cigarettes   Substance and Sexual Activity    Alcohol use: Yes     Comment: SOCIAL DRINKER    Drug use: No    Sexual activity: Not on file   Lifestyle    Physical activity:     Days per week: Not on file     Minutes per session: Not on file    Stress: Not on file   Relationships    Social connections:     Talks on phone: Not on file     Gets together: Not on file     Attends Faith service: Not on file     Active member of club or organization: Not on file     Attends meetings of clubs or organizations: Not on file     Relationship status: Not on file    Intimate partner violence:     Fear of current or ex partner: Not on file     Emotionally abused: Not on file     Physically abused: Not on file     Forced sexual activity: Not on file   Other Topics Concern    Not on file   Social History Narrative    Not on file       Current Medications  Current Outpatient Medications   Medication Sig Dispense Refill    Ascorbic Acid (VITAMIN C) 1000 MG tablet Take by mouth      B-Complex-C CAPS Take by mouth      Misc Natural Products (OSTEO BI-FLEX ADV JOINT SHIELD) TABS Take by mouth      Multiple Vitamin (MULTI-VITAMIN DAILY) TABS Take by mouth       No current facility-administered medications for this visit  Allergies  Allergies   Allergen Reactions    No Active Allergies        Past medical history, social history, family history, medications and allergies were reviewed  Vitals  There were no vitals filed for this visit  Physical Exam  Physical Exam   Constitutional: She is oriented to person, place, and time  She appears well-developed and well-nourished  HENT:   Head: Normocephalic  Eyes: Pupils are equal, round, and reactive to light  Neck: Normal range of motion  Cardiovascular: Normal rate and regular rhythm  Pulmonary/Chest: Effort normal    Abdominal: Soft  Normal appearance  There is no CVA tenderness  Musculoskeletal: Normal range of motion  Neurological: She is alert and oriented to person, place, and time  Skin: Skin is warm and dry  Psychiatric: She has a normal mood and affect   Her behavior is normal  Judgment and thought content normal        Results    I have personally reviewed all pertinent lab results and reviewed with patient  Lab Results   Component Value Date    GLUCOSE 84 10/24/2014    CALCIUM 9 2 01/22/2018     10/24/2014    K 4 4 01/22/2018    CO2 28 01/22/2018     01/22/2018    BUN 16 01/22/2018    CREATININE 0 71 01/22/2018     Lab Results   Component Value Date    WBC 5 21 01/22/2018    HGB 12 4 01/22/2018    HCT 36 9 01/22/2018    MCV 93 01/22/2018     01/22/2018     No results found for this or any previous visit (from the past 1 hour(s))

## 2019-02-18 NOTE — PATIENT INSTRUCTIONS
BLADDER HEALTH    WHAT IS CONSIDERED NORMAL?  The average bladder can hold about 2 cups of urine before it needs to be emptied   The normal range of voiding urine is 6 to 8 times during a 24 hour period  As we get older, our bladder capacity can get smaller and we may need to pass urine more frequently but usually not more than every 2 hours   Urine should flow easily without discomfort in a good, steady stream until the bladder is empty  No pushing or straining is necessary to empty the bladder   An urge is a signal that you feel as the bladder stretches to fill with urine  Urges can be felt even if the bladder is not full  Urges are not commands to go to the toilet, merely a signal and can be controlled  WHAT ARE GOOD BLADDER HABITS?  Take your time when emptying your bladder  Dont strain or push to empty your bladder  Make sure you empty your bladder completely each time you pass urine  Do not rush the process   Consistently ignoring the urge to go (waiting more than 4 hours between toileting) or urinating too infrequently may be convenient but not healthy for your bladder   Avoid going to the toilet just in case or more often than every 2 hours  It is usually not necessary to go when you feel the first urge  Try to go only when your bladder is full  Urgency and frequency of urination can be improved by retraining the bladder and spacing your fluid intake throughout the day  Practice good toilet habits  Dont let your bladder control your life  TIPS TO MAINTAIN GOOD BLADDER HABITS   Maintain a good fluid intake  Depending on your body size and environment, drink 6 -8 cups (8 oz each) of fluid per day unless otherwise advised by your doctor  Not enough fluid creates a foul odor and dark color of the urine     Limit the amount of caffeine (coffee, cola, chocolate or tea) and citrus foods that you consume as these foods can be associated with increased sensation of urinary urgency and frequency   Limit the amount of alcohol you drink  Alcohol increases urine production and makes it difficult for the brain to coordinate bladder control   Avoid constipation by maintaining a balanced diet of dietary fiber   Cigarette smoking is also irritating to the bladder surface and is associated with bladder cancer  In addition, the coughing associated with smoking may lead to increased incontinent episodes because of the increased pressure  HOW DIET CAN AFFECT YOUR BLADDER  Although there is no particular "diet" that can cure bladder control, there are certain dietary suggestions you can use to help control the problem  There are 2 points to consider when evaluating how your habits and diet may affect your bladder:    1  Foods and fluids can irritate the bladder  Some foods and beverages are thought to contribute to bladder leakage and irritability  However their effect on the bladder is not completely understood and you may want to see if eliminating one or all of these items improves your bladder control  If you are unable to give them up completely, it is recommended that you use the following items in moderation:   Acidic beverages and foods (orange juice, grapefruit juice, lemonade etc)   Alcoholic beverages   Vinegar   Coffee (regular and decaf)   Tea (regular and decaf)   Caffeinated beverages   Carbonated beverages          2  Drinking enough and the right kinds of fluids  Many people with bladder control issues decrease their intake of liquids in hope that they will need to urinate less frequently or have less urinary leakage   You should not restrict fluids to control your bladder  While a decrease in liquid intake does result in a decrease in the volume of urine, the smaller amount of urine may be more highly concentrated         Highly concentrated, dark yellow urine is irritating to the bladder surface and may actually cause you to go to the bathroom more frequently   It also encourages the growth of bacteria, which may lead to infections resulting in incontinence   Substitutions for Bladder Irritants: water is always the best beverage choice    Grape and apple juice are thirst quenchers are good selections and are not as irritating to the bladder   o Low acid fruits:  Pears, apricots, papaya, watermelon  o For coffee drinkers: KAVA®, Postum®, Myles®, Kaffree Maria Guadalupe®  o For tea drinkers:  non-citrus or herbal and sun brewed tea

## 2019-02-25 ENCOUNTER — APPOINTMENT (OUTPATIENT)
Dept: LAB | Facility: CLINIC | Age: 79
End: 2019-02-25
Payer: MEDICARE

## 2019-02-25 ENCOUNTER — TRANSCRIBE ORDERS (OUTPATIENT)
Dept: LAB | Facility: CLINIC | Age: 79
End: 2019-02-25

## 2019-02-25 DIAGNOSIS — B96.81 GASTRIC ULCER DUE TO HELICOBACTER PYLORI, UNSPECIFIED CHRONICITY: Primary | ICD-10-CM

## 2019-02-25 DIAGNOSIS — B96.81 GASTRIC ULCER DUE TO HELICOBACTER PYLORI, UNSPECIFIED CHRONICITY: ICD-10-CM

## 2019-02-25 DIAGNOSIS — K25.9 GASTRIC ULCER DUE TO HELICOBACTER PYLORI, UNSPECIFIED CHRONICITY: ICD-10-CM

## 2019-02-25 DIAGNOSIS — K25.9 GASTRIC ULCER DUE TO HELICOBACTER PYLORI, UNSPECIFIED CHRONICITY: Primary | ICD-10-CM

## 2019-02-25 PROCEDURE — 87338 HPYLORI STOOL AG IA: CPT

## 2019-02-26 LAB — H PYLORI AG STL QL IA: NEGATIVE

## 2019-03-05 ENCOUNTER — APPOINTMENT (OUTPATIENT)
Dept: LAB | Facility: HOSPITAL | Age: 79
End: 2019-03-05
Payer: MEDICARE

## 2019-03-05 ENCOUNTER — TRANSCRIBE ORDERS (OUTPATIENT)
Dept: LAB | Facility: HOSPITAL | Age: 79
End: 2019-03-05

## 2019-03-05 DIAGNOSIS — R10.84 ABDOMINAL PAIN, GENERALIZED: Primary | ICD-10-CM

## 2019-03-05 DIAGNOSIS — K57.92 DIVERTICULITIS: ICD-10-CM

## 2019-03-05 LAB
BUN SERPL-MCNC: 22 MG/DL (ref 5–25)
CREAT SERPL-MCNC: 1 MG/DL (ref 0.6–1.3)
GFR SERPL CREATININE-BSD FRML MDRD: 54 ML/MIN/1.73SQ M

## 2019-03-05 PROCEDURE — 82565 ASSAY OF CREATININE: CPT

## 2019-03-05 PROCEDURE — 36415 COLL VENOUS BLD VENIPUNCTURE: CPT

## 2019-03-05 PROCEDURE — 84520 ASSAY OF UREA NITROGEN: CPT

## 2019-04-02 ENCOUNTER — RX ONLY (RX ONLY)
Age: 79
End: 2019-04-02

## 2019-04-02 ENCOUNTER — DOCTOR'S OFFICE (OUTPATIENT)
Dept: URBAN - METROPOLITAN AREA CLINIC 136 | Facility: CLINIC | Age: 79
Setting detail: OPHTHALMOLOGY
End: 2019-04-02
Payer: COMMERCIAL

## 2019-04-02 DIAGNOSIS — H52.12: ICD-10-CM

## 2019-04-02 DIAGNOSIS — H52.01: ICD-10-CM

## 2019-04-02 DIAGNOSIS — H52.4: ICD-10-CM

## 2019-04-02 DIAGNOSIS — H34.8320: ICD-10-CM

## 2019-04-02 DIAGNOSIS — H52.223: ICD-10-CM

## 2019-04-02 PROCEDURE — 92004 COMPRE OPH EXAM NEW PT 1/>: CPT | Performed by: OPTOMETRIST

## 2019-04-02 PROCEDURE — 92015 DETERMINE REFRACTIVE STATE: CPT | Performed by: OPTOMETRIST

## 2019-04-02 ASSESSMENT — REFRACTION_MANIFEST
OS_CYLINDER: -1.00
OD_VA2: 20/20
OS_VA2: 20/
OS_AXIS: 080
OS_VA3: 20/
OD_AXIS: 115
OS_VA1: 20/
OD_CYLINDER: -1.00
OS_VA2: 20/20
OU_VA: 20/20-2
OD_VA3: 20/
OS_SPHERE: -1.25
OS_ADD: +2.75
OD_VA3: 20/
OD_VA2: 20/
OS_VA3: 20/
OD_ADD: +2.75
OD_VA1: 20/
OS_VA1: 20/20-2
OD_VA1: 20/20-2
OU_VA: 20/
OD_SPHERE: +0.50

## 2019-04-02 ASSESSMENT — REFRACTION_CURRENTRX
OD_CYLINDER: -0.75
OD_OVR_VA: 20/
OS_OVR_VA: 20/
OD_AXIS: 110
OS_CYLINDER: -0.50
OD_OVR_VA: 20/
OS_VPRISM_DIRECTION: TRF
OS_ADD: +3.00
OD_SPHERE: PLANO
OS_AXIS: 070
OD_ADD: +3.00
OD_OVR_VA: 20/
OS_OVR_VA: 20/
OS_OVR_VA: 20/
OD_VPRISM_DIRECTION: TRF
OS_SPHERE: -2.00

## 2019-04-02 ASSESSMENT — REFRACTION_AUTOREFRACTION
OS_CYLINDER: -1.50
OS_SPHERE: -0.50
OD_SPHERE: +1.00
OD_AXIS: 114
OS_AXIS: 083
OD_CYLINDER: -1.50

## 2019-04-02 ASSESSMENT — VISUAL ACUITY
OS_BCVA: 20/25
OD_BCVA: 20/25

## 2019-04-02 ASSESSMENT — SPHEQUIV_DERIVED
OS_SPHEQUIV: -1.75
OD_SPHEQUIV: 0.25
OD_SPHEQUIV: 0
OS_SPHEQUIV: -1.25

## 2019-04-02 ASSESSMENT — CONFRONTATIONAL VISUAL FIELD TEST (CVF)
OS_FINDINGS: FULL
OD_FINDINGS: FULL

## 2019-08-09 ENCOUNTER — OFFICE VISIT (OUTPATIENT)
Dept: FAMILY MEDICINE CLINIC | Facility: CLINIC | Age: 79
End: 2019-08-09
Payer: MEDICARE

## 2019-08-09 VITALS
SYSTOLIC BLOOD PRESSURE: 132 MMHG | BODY MASS INDEX: 25.34 KG/M2 | DIASTOLIC BLOOD PRESSURE: 84 MMHG | HEIGHT: 68 IN | WEIGHT: 167.2 LBS | TEMPERATURE: 98.2 F | HEART RATE: 85 BPM

## 2019-08-09 DIAGNOSIS — R07.9 CHEST PAIN, UNSPECIFIED TYPE: Primary | ICD-10-CM

## 2019-08-09 DIAGNOSIS — C50.919 MALIGNANT NEOPLASM OF FEMALE BREAST, UNSPECIFIED ESTROGEN RECEPTOR STATUS, UNSPECIFIED LATERALITY, UNSPECIFIED SITE OF BREAST (HCC): ICD-10-CM

## 2019-08-09 PROBLEM — M48.02 CERVICAL STENOSIS OF SPINAL CANAL: Status: ACTIVE | Noted: 2019-08-09

## 2019-08-09 PROCEDURE — 99214 OFFICE O/P EST MOD 30 MIN: CPT | Performed by: FAMILY MEDICINE

## 2019-08-09 RX ORDER — AMOXICILLIN AND CLAVULANATE POTASSIUM 875; 125 MG/1; MG/1
1 TABLET, FILM COATED ORAL EVERY 12 HOURS SCHEDULED
Qty: 14 TABLET | Refills: 0 | Status: SHIPPED | OUTPATIENT
Start: 2019-08-09 | End: 2019-08-16

## 2019-08-09 NOTE — PROGRESS NOTES
Assessment/Plan:    No problem-specific Assessment & Plan notes found for this encounter  Diagnoses and all orders for this visit:    Chest pain, unspecified type          Subjective:      Patient ID: Erik Carbajal is a 77 y o  female  Patient describes anterior chest pressure that radiated into her left neck and left arm off and on for several hours and then required an emergency room visit  They did serial enzymes EKG blood work physical exam monitoring etc all of which were okay  She has low cardiovascular risk  They suggested that she check with me about a possible stress test     Patient remains physically very active never gets chest pain when she is active and wonders whether she actually does need a stress test           The following portions of the patient's history were reviewed and updated as appropriate: allergies, current medications, past family history, past medical history, past social history, past surgical history and problem list     Review of Systems   Constitutional: Negative for activity change and appetite change  HENT: Negative for voice change  Eyes: Negative for visual disturbance  Respiratory: Negative for chest tightness and shortness of breath  Cardiovascular: Negative for chest pain, palpitations and leg swelling  Gastrointestinal: Negative for abdominal pain, blood in stool, constipation and diarrhea  Genitourinary: Negative for dysuria, vaginal bleeding and vaginal discharge  Skin: Negative for rash  Neurological: Negative for dizziness  Psychiatric/Behavioral: Negative for dysphoric mood  Objective:  Vitals:    08/09/19 1034   BP: 132/84   BP Location: Left arm   Patient Position: Sitting   Cuff Size: Adult   Pulse: 85   Temp: 98 2 °F (36 8 °C)   TempSrc: Temporal   Weight: 75 8 kg (167 lb 3 2 oz)   Height: 5' 8" (1 727 m)      Physical Exam   Constitutional: She appears well-developed and well-nourished     HENT:   Head: Normocephalic and atraumatic  Eyes: Conjunctivae are normal    Neck: Neck supple  No thyromegaly present  Cardiovascular: Normal rate, regular rhythm, normal heart sounds and intact distal pulses  No murmur heard  Pulmonary/Chest: Effort normal and breath sounds normal  No respiratory distress  Musculoskeletal: She exhibits no edema  Lymphadenopathy:     She has no cervical adenopathy  Skin: Skin is warm and dry  Psychiatric: She has a normal mood and affect  Her behavior is normal          One episode of non exertional chest sx ; no significant C/V risk except age; exercise tolerance excellent ; I offered ETT as an option: pt does not want to do at this time ; could consider in future prn  Interestingly the patient was re-evaluated by the ENT docs for hoarseness etc and thought to have GERD  She was placed on a PPI and her voice and throat symptoms seem improved  I wonder if the chest pain might have been a reflection of that as she was not on the PPI at that time

## 2019-08-09 NOTE — PATIENT INSTRUCTIONS
Call if any recurrent chest symptoms   Gastroesophageal Reflux Disease   WHAT YOU NEED TO KNOW:   What is gastroesophageal reflux disease? Gastroesophageal reflux occurs when acid and food in the stomach back up into the esophagus  Gastroesophageal reflux disease (GERD) is reflux that occurs more than twice a week for a few weeks  It usually causes heartburn and other symptoms  GERD can cause other health problems over time if it is not treated  What causes GERD? GERD often occurs when the lower muscle (sphincter) of the esophagus does not close properly  The sphincter normally opens to let food into the stomach  It then closes to keep food and stomach acid in the stomach  If the sphincter does not close properly, stomach acid and food back up (reflux) into the esophagus  The following may increase your risk for GERD:  · Certain foods such as spicy foods, chocolate, foods that contain caffeine, peppermint, and fried foods    · Hiatal hernia    · Certain medicines such as calcium channel blockers (used to treat high blood pressure), allergy medicines, sedatives, or antidepressants    · Pregnancy or obesity    · Lying down after a meal    · Drinking alcohol or smoking  What are the signs and symptoms of GERD? Heartburn is the most common symptom of GERD  You may feel burning pain in your chest or below the breast bone  This usually occurs after meals and spreads to your neck, jaw, or shoulder  The pain gets better when you change positions  You may also have any of the following:  · Bitter or acid taste in your mouth    · Dry cough    · Trouble swallowing or pain with swallowing    · Hoarseness or sore throat    · Frequent burping or hiccups    · Feeling of fullness soon after you start eating  How is GERD diagnosed? Your healthcare provider will ask about your symptoms and when they started  Tell him or her about other medical conditions you have, your eating habits, and your activities   You may also need any of the following:  · Esophageal pH monitoring  is used to place a small probe inside your esophagus and stomach to check the amount of acid  · An endoscopy  is a procedure used to look at the inside of your esophagus and stomach  An endoscope is a bendable tube with a light and camera on the end  Your healthcare provider may remove a small sample of tissue and send it to a lab for tests  · Upper GI x-rays  are done to take pictures of your stomach and intestines (bowel)  You may be given a chalky liquid to drink before the pictures are taken  This liquid helps your stomach and intestines show up better on the x-rays  · Esophageal manometry  is a test that shows how your esophagus pushes food and fluid to your stomach  It also shows the pressures in your esophagus and stomach  It may show a hiatal hernia  How is GERD treated? · Medicines  are used to decrease stomach acid  Medicine may also be used to help your lower esophageal sphincter and stomach contract (tighten) more  · Surgery  is done to wrap the upper part of the stomach around the esophageal sphincter  This will strengthen the sphincter and prevent reflux  How can I help manage GERD? · Do not have foods or drinks that may increase heartburn  These include chocolate, peppermint, fried or fatty foods, drinks that contain caffeine, or carbonated drinks (soda)  Other foods include spicy foods, onions, tomatoes, and tomato-based foods  Do not have foods or drinks that can irritate your esophagus, such as citrus fruits, juices, and alcohol  · Do not eat large meals  When you eat a lot of food at one time, your stomach needs more acid to digest it  Eat 6 small meals each day instead of 3 large ones, and eat slowly  Do not eat meals 2 to 3 hours before bedtime  · Elevate the head of your bed  Place 6-inch blocks under the head of your bed frame   You may also use more than one pillow under your head and shoulders while you sleep     · Maintain a healthy weight  If you are overweight, weight loss may help relieve symptoms of GERD  · Do not smoke  Smoking weakens the lower esophageal sphincter and increases the risk of GERD  Ask your healthcare provider for information if you currently smoke and need help to quit  E-cigarettes or smokeless tobacco still contain nicotine  Talk to your healthcare provider before you use these products  · Do not wear clothing that is tight around your waist   Tight clothing can put pressure on your stomach and cause or worsen GERD symptoms  When should I seek immediate care? · You feel full and cannot burp or vomit  · You have severe chest pain and sudden trouble breathing  · Your bowel movements are black, bloody, or tarry-looking  · Your vomit looks like coffee grounds or has blood in it  When should I contact my healthcare provider? · You vomit large amounts, or you vomit often  · You have trouble breathing after you vomit  · You have trouble swallowing, or pain with swallowing  · You are losing weight without trying  · Your symptoms get worse or do not improve with treatment  · You have questions or concerns about your condition or care  CARE AGREEMENT:   You have the right to help plan your care  Learn about your health condition and how it may be treated  Discuss treatment options with your caregivers to decide what care you want to receive  You always have the right to refuse treatment  The above information is an  only  It is not intended as medical advice for individual conditions or treatments  Talk to your doctor, nurse or pharmacist before following any medical regimen to see if it is safe and effective for you  © 2017 [employer] Information is for End User's use only and may not be sold, redistributed or otherwise used for commercial purposes   All illustrations and images included in CareNotes® are the copyrighted property of [employer] Shailesh GRIMALDO  or Jamel Lozano  Tylenol arthritis is good for muscular ache and pain also good for spasm; do not use Aleve Advil Motrin or ibuprofen as this can aggravate GERD

## 2019-08-20 ENCOUNTER — TREATMENT (OUTPATIENT)
Dept: FAMILY MEDICINE CLINIC | Facility: CLINIC | Age: 79
End: 2019-08-20

## 2019-08-20 ENCOUNTER — TELEPHONE (OUTPATIENT)
Dept: FAMILY MEDICINE CLINIC | Facility: CLINIC | Age: 79
End: 2019-08-20

## 2019-08-20 DIAGNOSIS — M48.02 CERVICAL STENOSIS OF SPINAL CANAL: Primary | ICD-10-CM

## 2019-08-20 RX ORDER — CYCLOBENZAPRINE HCL 5 MG
5 TABLET ORAL
Qty: 10 TABLET | Refills: 2 | Status: SHIPPED | OUTPATIENT
Start: 2019-08-20 | End: 2019-12-12 | Stop reason: ALTCHOICE

## 2019-08-20 NOTE — TELEPHONE ENCOUNTER
Pt states that when she was last seen at her appointment the medication Flexeril was to be called in? Pt states that it wasn't  Please advise thank you

## 2019-12-12 ENCOUNTER — OFFICE VISIT (OUTPATIENT)
Dept: FAMILY MEDICINE CLINIC | Facility: CLINIC | Age: 79
End: 2019-12-12
Payer: MEDICARE

## 2019-12-12 VITALS
HEART RATE: 85 BPM | WEIGHT: 166.4 LBS | DIASTOLIC BLOOD PRESSURE: 74 MMHG | OXYGEN SATURATION: 98 % | BODY MASS INDEX: 25.22 KG/M2 | SYSTOLIC BLOOD PRESSURE: 130 MMHG | TEMPERATURE: 98.5 F | HEIGHT: 68 IN

## 2019-12-12 DIAGNOSIS — K57.92 DIVERTICULITIS: ICD-10-CM

## 2019-12-12 DIAGNOSIS — Z00.00 MEDICARE ANNUAL WELLNESS VISIT, SUBSEQUENT: ICD-10-CM

## 2019-12-12 DIAGNOSIS — M48.02 CERVICAL STENOSIS OF SPINAL CANAL: ICD-10-CM

## 2019-12-12 DIAGNOSIS — C50.919 MALIGNANT NEOPLASM OF FEMALE BREAST, UNSPECIFIED ESTROGEN RECEPTOR STATUS, UNSPECIFIED LATERALITY, UNSPECIFIED SITE OF BREAST (HCC): ICD-10-CM

## 2019-12-12 DIAGNOSIS — Z23 IMMUNIZATION DUE: Primary | ICD-10-CM

## 2019-12-12 PROCEDURE — G0439 PPPS, SUBSEQ VISIT: HCPCS | Performed by: FAMILY MEDICINE

## 2019-12-12 PROCEDURE — 99214 OFFICE O/P EST MOD 30 MIN: CPT | Performed by: FAMILY MEDICINE

## 2019-12-12 NOTE — PATIENT INSTRUCTIONS
Medicare Preventive Visit Patient Instructions  Thank you for completing your Welcome to Medicare Visit or Medicare Annual Wellness Visit today  Your next wellness visit will be due in one year (12/12/2020)  The screening/preventive services that you may require over the next 5-10 years are detailed below  Some tests may not apply to you based off risk factors and/or age  Screening tests ordered at today's visit but not completed yet may show as past due  Also, please note that scanned in results may not display below  Preventive Screenings:  Service Recommendations Previous Testing/Comments   Colorectal Cancer Screening  * Colonoscopy    * Fecal Occult Blood Test (FOBT)/Fecal Immunochemical Test (FIT)  * Fecal DNA/Cologuard Test  * Flexible Sigmoidoscopy Age: 54-65 years old   Colonoscopy: every 10 years (may be performed more frequently if at higher risk)  OR  FOBT/FIT: every 1 year  OR  Cologuard: every 3 years  OR  Sigmoidoscopy: every 5 years  Screening may be recommended earlier than age 48 if at higher risk for colorectal cancer  Also, an individualized decision between you and your healthcare provider will decide whether screening between the ages of 74-80 would be appropriate  Colonoscopy: Not on file  FOBT/FIT: Not on file  Cologuard: Not on file  Sigmoidoscopy: Not on file         Breast Cancer Screening Age: 36 years old  Frequency: every 1-2 years  Not required if history of left and right mastectomy Mammogram: 10/16/2018    History Breast Cancer   Cervical Cancer Screening Between the ages of 21-29, pap smear recommended once every 3 years  Between the ages of 33-67, can perform pap smear with HPV co-testing every 5 years     Recommendations may differ for women with a history of total hysterectomy, cervical cancer, or abnormal pap smears in past  Pap Smear: Not on file    Screening Not Indicated   Hepatitis C Screening Once for adults born between 1945 and 1965  More frequently in patients at high risk for Hepatitis C Hep C Antibody: Not on file       Diabetes Screening 1-2 times per year if you're at risk for diabetes or have pre-diabetes Fasting glucose: 82 mg/dL   A1C: No results in last 5 years       Cholesterol Screening Once every 5 years if you don't have a lipid disorder  May order more often based on risk factors  Lipid panel: 08/23/2018    Screening Current     Other Preventive Screenings Covered by Medicare:  1  Abdominal Aortic Aneurysm (AAA) Screening: covered once if your at risk  You're considered to be at risk if you have a family history of AAA  2  Lung Cancer Screening: covers low dose CT scan once per year if you meet all of the following conditions: (1) Age 50-69; (2) No signs or symptoms of lung cancer; (3) Current smoker or have quit smoking within the last 15 years; (4) You have a tobacco smoking history of at least 30 pack years (packs per day multiplied by number of years you smoked); (5) You get a written order from a healthcare provider  3  Glaucoma Screening: covered annually if you're considered high risk: (1) You have diabetes OR (2) Family history of glaucoma OR (3)  aged 48 and older OR (3)  American aged 72 and older  3  Osteoporosis Screening: covered every 2 years if you meet one of the following conditions: (1) You're estrogen deficient and at risk for osteoporosis based off medical history and other findings; (2) Have a vertebral abnormality; (3) On glucocorticoid therapy for more than 3 months; (4) Have primary hyperparathyroidism; (5) On osteoporosis medications and need to assess response to drug therapy  · Last bone density test (DXA Scan): Not on file  5  HIV Screening: covered annually if you're between the age of 12-76  Also covered annually if you are younger than 13 and older than 72 with risk factors for HIV infection  For pregnant patients, it is covered up to 3 times per pregnancy      Immunizations:  Immunization Recommendations   Influenza Vaccine Annual influenza vaccination during flu season is recommended for all persons aged >= 6 months who do not have contraindications   Pneumococcal Vaccine (Prevnar and Pneumovax)  * Prevnar = PCV13  * Pneumovax = PPSV23   Adults 25-60 years old: 1-3 doses may be recommended based on certain risk factors  Adults 72 years old: Prevnar (PCV13) vaccine recommended followed by Pneumovax (PPSV23) vaccine  If already received PPSV23 since turning 65, then PCV13 recommended at least one year after PPSV23 dose  Hepatitis B Vaccine 3 dose series if at intermediate or high risk (ex: diabetes, end stage renal disease, liver disease)   Tetanus (Td) Vaccine - COST NOT COVERED BY MEDICARE PART B Following completion of primary series, a booster dose should be given every 10 years to maintain immunity against tetanus  Td may also be given as tetanus wound prophylaxis  Tdap Vaccine - COST NOT COVERED BY MEDICARE PART B Recommended at least once for all adults  For pregnant patients, recommended with each pregnancy  Shingles Vaccine (Shingrix) - COST NOT COVERED BY MEDICARE PART B  2 shot series recommended in those aged 48 and above     Health Maintenance Due:  There are no preventive care reminders to display for this patient  Immunizations Due:      Topic Date Due    DTaP,Tdap,and Td Vaccines (1 - Tdap) 08/18/1951     Advance Directives   What are advance directives? Advance directives are legal documents that state your wishes and plans for medical care  These plans are made ahead of time in case you lose your ability to make decisions for yourself  Advance directives can apply to any medical decision, such as the treatments you want, and if you want to donate organs  What are the types of advance directives? There are many types of advance directives, and each state has rules about how to use them  You may choose a combination of any of the following:  · Living will:   This is a written record of the treatment you want  You can also choose which treatments you do not want, which to limit, and which to stop at a certain time  This includes surgery, medicine, IV fluid, and tube feedings  · Durable power of  for healthcare [facility]): This is a written record that states who you want to make healthcare choices for you when you are unable to make them for yourself  This person, called a proxy, is usually a family member or a friend  You may choose more than 1 proxy  · Do not resuscitate (DNR) order:  A DNR order is used in case your heart stops beating or you stop breathing  It is a request not to have certain forms of treatment, such as CPR  A DNR order may be included in other types of advance directives  · Medical directive: This covers the care that you want if you are in a coma, near death, or unable to make decisions for yourself  You can list the treatments you want for each condition  Treatment may include pain medicine, surgery, blood transfusions, dialysis, IV or tube feedings, and a ventilator (breathing machine)  · Values history: This document has questions about your views, beliefs, and how you feel and think about life  This information can help others choose the care that you would choose  Why are advance directives important? An advance directive helps you control your care  Although spoken wishes may be used, it is better to have your wishes written down  Spoken wishes can be misunderstood, or not followed  Treatments may be given even if you do not want them  An advance directive may make it easier for your family to make difficult choices about your care  Weight Management   Why it is important to manage your weight:  Being overweight increases your risk of health conditions such as heart disease, high blood pressure, type 2 diabetes, and certain types of cancer  It can also increase your risk for osteoarthritis, sleep apnea, and other respiratory problems  Aim for a slow, steady weight loss  Even a small amount of weight loss can lower your risk of health problems  How to lose weight safely:  A safe and healthy way to lose weight is to eat fewer calories and get regular exercise  You can lose up about 1 pound a week by decreasing the number of calories you eat by 500 calories each day  Healthy meal plan for weight management:  A healthy meal plan includes a variety of foods, contains fewer calories, and helps you stay healthy  A healthy meal plan includes the following:  · Eat whole-grain foods more often  A healthy meal plan should contain fiber  Fiber is the part of grains, fruits, and vegetables that is not broken down by your body  Whole-grain foods are healthy and provide extra fiber in your diet  Some examples of whole-grain foods are whole-wheat breads and pastas, oatmeal, brown rice, and bulgur  · Eat a variety of vegetables every day  Include dark, leafy greens such as spinach, kale, buster greens, and mustard greens  Eat yellow and orange vegetables such as carrots, sweet potatoes, and winter squash  · Eat a variety of fruits every day  Choose fresh or canned fruit (canned in its own juice or light syrup) instead of juice  Fruit juice has very little or no fiber  · Eat low-fat dairy foods  Drink fat-free (skim) milk or 1% milk  Eat fat-free yogurt and low-fat cottage cheese  Try low-fat cheeses such as mozzarella and other reduced-fat cheeses  · Choose meat and other protein foods that are low in fat  Choose beans or other legumes such as split peas or lentils  Choose fish, skinless poultry (chicken or turkey), or lean cuts of red meat (beef or pork)  Before you cook meat or poultry, cut off any visible fat  · Use less fat and oil  Try baking foods instead of frying them  Add less fat, such as margarine, sour cream, regular salad dressing and mayonnaise to foods  Eat fewer high-fat foods   Some examples of high-fat foods include french fries, doughnuts, ice cream, and cakes  · Eat fewer sweets  Limit foods and drinks that are high in sugar  This includes candy, cookies, regular soda, and sweetened drinks  Exercise:  Exercise at least 30 minutes per day on most days of the week  Some examples of exercise include walking, biking, dancing, and swimming  You can also fit in more physical activity by taking the stairs instead of the elevator or parking farther away from stores  Ask your healthcare provider about the best exercise plan for you  © Copyright [employer] 2018 Information is for End User's use only and may not be sold, redistributed or otherwise used for commercial purposes  All illustrations and images included in CareNotes® are the copyrighted property of [employer]  or [facility] Femi Medina     Weight Management   AMBULATORY CARE:   Why it is important to manage your weight:  Being overweight increases your risk of health conditions such as heart disease, high blood pressure, type 2 diabetes, and certain types of cancer  It can also increase your risk for osteoarthritis, sleep apnea, and other respiratory problems  Aim for a slow, steady weight loss  Even a small amount of weight loss can lower your risk of health problems  How to lose weight safely:  A safe and healthy way to lose weight is to eat fewer calories and get regular exercise  You can lose up about 1 pound a week by decreasing the number of calories you eat by 500 calories each day  You can decrease calories by eating smaller portion sizes or by cutting out high-calorie foods  Read labels to find out how many calories are in the foods you eat  You can also burn calories with exercise such as walking, swimming, or biking  You will be more likely to keep weight off if you make these changes part of your lifestyle  Healthy meal plan for weight management:  A healthy meal plan includes a variety of foods, contains fewer calories, and helps you stay healthy   A healthy meal plan includes the following:  · Eat whole-grain foods more often  A healthy meal plan should contain fiber  Fiber is the part of grains, fruits, and vegetables that is not broken down by your body  Whole-grain foods are healthy and provide extra fiber in your diet  Some examples of whole-grain foods are whole-wheat breads and pastas, oatmeal, brown rice, and bulgur  · Eat a variety of vegetables every day  Include dark, leafy greens such as spinach, kale, buster greens, and mustard greens  Eat yellow and orange vegetables such as carrots, sweet potatoes, and winter squash  · Eat a variety of fruits every day  Choose fresh or canned fruit (canned in its own juice or light syrup) instead of juice  Fruit juice has very little or no fiber  · Eat low-fat dairy foods  Drink fat-free (skim) milk or 1% milk  Eat fat-free yogurt and low-fat cottage cheese  Try low-fat cheeses such as mozzarella and other reduced-fat cheeses  · Choose meat and other protein foods that are low in fat  Choose beans or other legumes such as split peas or lentils  Choose fish, skinless poultry (chicken or turkey), or lean cuts of red meat (beef or pork)  Before you cook meat or poultry, cut off any visible fat  · Use less fat and oil  Try baking foods instead of frying them  Add less fat, such as margarine, sour cream, regular salad dressing and mayonnaise to foods  Eat fewer high-fat foods  Some examples of high-fat foods include french fries, doughnuts, ice cream, and cakes  · Eat fewer sweets  Limit foods and drinks that are high in sugar  This includes candy, cookies, regular soda, and sweetened drinks  Ways to decrease calories:   · Eat smaller portions  ¨ Use a small plate with smaller servings  ¨ Do not eat second helpings  ¨ When you eat at a restaurant, ask for a box and place half of your meal in the box before you eat  ¨ Share an entrée with someone else      · Replace high-calorie snacks with healthy, low-calorie snacks  ¨ Choose fresh fruit, vegetables, fat-free rice cakes, or air-popped popcorn instead of potato chips, nuts, or chocolate  ¨ Choose water or calorie-free drinks instead of soda or sweetened drinks  · Eat regular meals  Skipping meals can lead to overeating later in the day  Eat a healthy snack in place of a meal if you do not have time to eat a regular meal      · Do not shop for groceries when you are hungry  You may be more likely to make unhealthy food choices  Take a grocery list of healthy foods and shop after you have eaten  Exercise:  Exercise at least 30 minutes per day on most days of the week  Some examples of exercise include walking, biking, dancing, and swimming  You can also fit in more physical activity by taking the stairs instead of the elevator or parking farther away from stores  Ask your healthcare provider about the best exercise plan for you  Other things to consider as you try to lose weight:   · Be aware of situations that may give you the urge to overeat, such as eating while watching television  Find ways to avoid these situations  For example, read a book, go for a walk, or do crafts  · Meet with a weight loss support group or friends who are also trying to lose weight  This may help you stay motivated to continue working on your weight loss goals  © 2017 [address] [employer] Information is for End User's use only and may not be sold, redistributed or otherwise used for commercial purposes  All illustrations and images included in CareNotes® are the copyrighted property of [employer]  or Jamel Lozano  The above information is an  only  It is not intended as medical advice for individual conditions or treatments  Talk to your doctor, nurse or pharmacist before following any medical regimen to see if it is safe and effective for you      Heart Healthy Diet   AMBULATORY CARE:   A heart healthy diet  is an eating plan low in total fat, unhealthy fats, and sodium (salt)  A heart healthy diet helps decrease your risk for heart disease and stroke  Limit the amount of fat you eat to 25% to 35% of your total daily calories  Limit sodium to less than 2,300 mg each day  Healthy fats:  Healthy fats can help improve cholesterol levels  The risk for heart disease is decreased when cholesterol levels are normal  Choose healthy fats, such as the following:  · Unsaturated fat  is found in foods such as soybean, canola, olive, corn, and safflower oils  It is also found in soft tub margarine that is made with liquid vegetable oil  · Omega-3 fat  is found in certain fish, such as salmon, tuna, and trout, and in walnuts and flaxseed  Unhealthy fats:  Unhealthy fats can cause unhealthy cholesterol levels in your blood and increase your risk of heart disease  Limit unhealthy fats, such as the following:  · Cholesterol  is found in animal foods, such as eggs and lobster, and in dairy products made from whole milk  Limit cholesterol to less than 300 milligrams (mg) each day  You may need to limit cholesterol to 200 mg each day if you have heart disease  · Saturated fat  is found in meats, such as aguilar and hamburger  It is also found in chicken or turkey skin, whole milk, and butter  Limit saturated fat to less than 7% of your total daily calories  Limit saturated fat to less than 6% if you have heart disease or are at increased risk for it  · Trans fat  is found in packaged foods, such as potato chips and cookies  It is also in hard margarine, some fried foods, and shortening  Avoid trans fats as much as possible    Heart healthy foods and drinks to include:  Ask your dietitian or healthcare provider how many servings to have from each of the following food groups:  · Grains:      ¨ Whole-wheat breads, cereals, and pastas, and brown rice    ¨ Low-fat, low-sodium crackers and chips    · Vegetables:      ¨ Broccoli, green beans, green peas, and spinach    ¨ Collards, kale, and lima beans    ¨ Carrots, sweet potatoes, tomatoes, and peppers    ¨ Canned vegetables with no salt added    · Fruits:      ¨ Bananas, peaches, pears, and pineapple    ¨ Grapes, raisins, and dates    ¨ Oranges, tangerines, grapefruit, orange juice, and grapefruit juice    ¨ Apricots, mangoes, melons, and papaya    ¨ Raspberries and strawberries    ¨ Canned fruit with no added sugar    · Low-fat dairy products:      ¨ Nonfat (skim) milk, 1% milk, and low-fat almond, cashew, or soy milks fortified with calcium    ¨ Low-fat cheese, regular or frozen yogurt, and cottage cheese    · Meats and proteins , such as lean cuts of beef and pork (loin, leg, round), skinless chicken and turkey, legumes, soy products, egg whites, and nuts  Foods and drinks to limit or avoid:  Ask your dietitian or healthcare provider about these and other foods that are high in unhealthy fat, sodium, and sugar:  · Snack or packaged foods , such as frozen dinners, cookies, macaroni and cheese, and cereals with more than 300 mg of sodium per serving    · Canned or dry mixes  for cakes, soups, sauces, or gravies    · Vegetables with added sodium , such as instant potatoes, vegetables with added sauces, or regular canned vegetables    · Other foods high in sodium , such as ketchup, barbecue sauce, salad dressing, pickles, olives, soy sauce, and miso    · High-fat dairy foods  such as whole or 2% milk, cream cheese, or sour cream, and cheeses     · High-fat protein foods  such as high-fat cuts of beef (T-bone steaks, ribs), chicken or turkey with skin, and organ meats, such as liver    · Cured or smoked meats , such as hot dogs, aguilar, and sausage    · Unhealthy fats and oils , such as butter, stick margarine, shortening, and cooking oils such as coconut or palm oil    · Food and drinks high in sugar , such as soft drinks (soda), sports drinks, sweetened tea, candy, cake, cookies, pies, and whitney  Other diet guidelines to follow:   · Eat more foods containing omega-3 fats  Eat fish high in omega-3 fats at least 2 times a week  · Limit alcohol  Too much alcohol can damage your heart and raise your blood pressure  Women should limit alcohol to 1 drink a day  Men should limit alcohol to 2 drinks a day  A drink of alcohol is 12 ounces of beer, 5 ounces of wine, or 1½ ounces of liquor  · Choose low-sodium foods  High-sodium foods can lead to high blood pressure  Add little or no salt to food you prepare  Use herbs and spices in place of salt  · Eat more fiber  to help lower cholesterol levels  Eat at least 5 servings of fruits and vegetables each day  Eat 3 ounces of whole-grain foods each day  Legumes (beans) are also a good source of fiber  Lifestyle guidelines:   · Do not smoke  Nicotine and other chemicals in cigarettes and cigars can cause lung and heart damage  Ask your healthcare provider for information if you currently smoke and need help to quit  E-cigarettes or smokeless tobacco still contain nicotine  Talk to your healthcare provider before you use these products  · Exercise regularly  to help you maintain a healthy weight and improve your blood pressure and cholesterol levels  Ask your healthcare provider about the best exercise plan for you  Do not start an exercise program without asking your healthcare provider  Follow up with your healthcare provider as directed:  Write down your questions so you remember to ask them during your visits  © 2017 [address] [employer] Information is for End User's use only and may not be sold, redistributed or otherwise used for commercial purposes  All illustrations and images included in CareNotes® are the copyrighted property of [employer]  or Jamel Lozano  The above information is an  only  It is not intended as medical advice for individual conditions or treatments   Talk to your doctor, nurse or pharmacist before following any medical regimen to see if it is safe and effective for you  Calorie Counting Diet   WHAT YOU NEED TO KNOW:   What is a calorie counting diet? It is a meal plan based on counting calories each day to reach a healthy body weight  You will need to eat fewer calories if you are trying to lose weight  Weight loss may decrease your risk for certain health problems or improve your health if you have health problems  Some of these health problems include heart disease, high blood pressure, and diabetes  What foods should I avoid? Your dietitian will tell you if you need to avoid certain foods based on your body weight and health condition  You may need to avoid high-fat foods if you are at risk for or have heart disease  You may need to eat fewer foods from the breads and starches food group if you have diabetes  How many calories are in foods? The following is a list of foods and drinks with the approximate number of calories in each  Check the food label to find the exact number of calories  A dietitian can tell you how many calories you should have from each food group each day    · Carbohydrate:      ¨ ½ of a 3-inch bagel, 1 slice of bread, or ½ of a hamburger bun or hot dog bun (80)    ¨ 1 (8-inch) flour tortilla or ½ cup of cooked rice (100)    ¨ 1 (6-inch) corn tortilla (80)    ¨ 1 (6-inch) pancake or 1 cup of bran flakes cereal (110)    ¨ ½ cup of cooked cereal (80)    ¨ ½ cup of cooked pasta (85)    ¨ 1 ounce of pretzels (100)    ¨ 3 cups of air-popped popcorn without butter or oil (80)    · Dairy:      ¨ 1 cup of skim or 1% milk (90)    ¨ 1 cup of 2% milk (120)    ¨ 1 cup of whole milk (160)    ¨ 1 cup of 2% chocolate milk (220)    ¨ 1 ounce of low-fat cheese with 3 grams of fat per ounce (70)    ¨ 1 ounce of cheddar cheese (114)    ¨ ½ cup of 1% fat cottage cheese (80)    ¨ 1 cup of plain or sugar-free, fat-free yogurt (90)    · Protein foods:      ¨ 3 ounces of fish (not breaded or fried) (95)    ¨ 3 ounces of breaded, fried fish (195)    ¨ ¾ cup of tuna canned in water (105)    ¨ 3 ounces of chicken breast without skin (105)    ¨ 1 fried chicken breast with skin (350)    ¨ ¼ cup of fat free egg substitute (40)    ¨ 1 large egg (75)    ¨ 3 ounces of lean beef or pork (165)    ¨ 3 ounces of fried pork chop or ham (185)    ¨ ½ cup of cooked dried beans, such as kidney, matthew, lentils, or navy (115)    ¨ 3 ounces of bologna or lunch meat (225)    ¨ 2 links of breakfast sausage (140)    · Vegetables:      ¨ ½ cup of sliced mushrooms (10)    ¨ 1 cup of salad greens, such as lettuce, spinach, or swathi (15)    ¨ ½ cup of steamed asparagus (20)    ¨ ½ cup of cooked summer squash, zucchini squash, or green or wax beans (25)    ¨ 1 cup of broccoli or cauliflower florets, or 1 medium tomato (25)    ¨ 1 large raw carrot or ½ cup of cooked carrots (40)    ¨ ? of a medium cucumber or 1 stalk of celery (5)    ¨ 1 small baked potato (160)    ¨ 1 cup of breaded, fried vegetables (230)    · Fruit:      ¨ 1 (6-inch) banana (55)     ¨ ½ of a 4-inch grapefruit (55)    ¨ 15 grapes (60)    ¨ 1 medium orange or apple (70)    ¨ 1 large peach (65)    ¨ 1 cup of fresh pineapple chunks (75)    ¨ 1 cup of melon cubes (50)    ¨ 1¼ cups of whole strawberries (45)    ¨ ½ cup of fruit canned in juice (55)    ¨ ½ cup of fruit canned in heavy syrup (110)    ¨ ?  cup of raisins (130)    ¨ ½ cup of unsweetened fruit juice (60)    ¨ ½ cup of grape, cranberry, or prune juice (90)    · Fat:      ¨ 10 peanuts or 2 teaspoons of peanut butter (55)    ¨ 2 tablespoons of avocado or 1 tablespoon of regular salad dressing (45)    ¨ 2 slices of aguilar (90)    ¨ 1 teaspoon of oil, such as safflower, canola, corn, or olive oil (45)    ¨ 2 teaspoons of low-fat margarine, or 1 tablespoon of low-fat mayonnaise (50)    ¨ 1 teaspoon of regular margarine (40)    ¨ 1 tablespoon of regular mayonnaise (135)    ¨ 1 tablespoon of cream cheese or 2 tablespoons of low-fat cream cheese (45)    ¨ 2 tablespoons of vegetable shortening (215)    · Dessert and sweets:      ¨ 8 animal crackers or 5 vanilla wafers (80)    ¨ 1 frozen fruit juice bar (80)    ¨ ½ cup of ice milk or low-fat frozen yogurt (90)    ¨ ½ cup of sherbet or sorbet (125)    ¨ ½ cup of sugar-free pudding or custard (60)    ¨ ½ cup of ice cream (140)    ¨ ½ cup of pudding or custard (175)    ¨ 1 (2-inch) square chocolate brownie (185)    · Combination foods:      ¨ Bean burrito made with an 8-inch tortilla, without cheese (275)    ¨ Chicken breast sandwich with lettuce and tomato (325)    ¨ 1 cup of chicken noodle soup (60)    ¨ 1 beef taco (175)    ¨ Regular hamburger with lettuce and tomato (310)    ¨ Regular cheeseburger with lettuce and tomato (410)     ¨ ¼ of a 12-inch cheese pizza (280)    ¨ Fried fish sandwich with lettuce and tomato (425)    ¨ Hot dog and bun (275)    ¨ 1½ cups of macaroni and cheese (310)    ¨ Taco salad with a fried tortilla shell (870)    · Low-calorie foods:      ¨ 1 tablespoon of ketchup or 1 tablespoon of fat free sour cream (15)    ¨ 1 teaspoon of mustard (5)    ¨ ¼ cup of salsa (20)    ¨ 1 large dill pickle (15)    ¨ 1 tablespoon of fat free salad dressing (10)    ¨ 2 teaspoons of low-sugar, light jam or jelly, or 1 tablespoon of sugar-free syrup (15)    ¨ 1 sugar-free popsicle (15)    ¨ 1 cup of club soda, seltzer water, or diet soda (0)  CARE AGREEMENT:   You have the right to help plan your care  Discuss treatment options with your caregivers to decide what care you want to receive  You always have the right to refuse treatment  The above information is an  only  It is not intended as medical advice for individual conditions or treatments  Talk to your doctor, nurse or pharmacist before following any medical regimen to see if it is safe and effective for you    © 2017 [address] Information is for End User's use only and may not be sold, redistributed or otherwise used for commercial purposes  All illustrations and images included in CareNotes® are the copyrighted property of A D A M , Inc  or Jamel Lozano  CURRENT [employer] TIPS ON EXERCISE:    IF YOU HAVE CONDITIONS THAT PREVENT AEROBIC EXERCISE:    WALK 30 MINUTES AT LEAST 5 DAYS PER WEEK; MAY BREAK IT UP INTO 10-  15 MINUTE SESSIONS   GET SOME RESISTANCE EXERCISES USING THE MAJOR MUSCLE GROUPS 2-3 TIMES PER WEEK     IF YOU ARE PHYSICALLY ABLE:    TRY TO GET 10,000 STEPS 3 TIMES PER WEEK  PLUS  GO "ONLINE" TO CHECK [employer] HEART RATE FOR YOUR AGE AND DO AEROBIC EXERCISES (JOG/STATIONARY BIKE/ELLIPTICAL) FOR 20-30 MINUTES 2-3 TIMES PER WEEK  MEASURES THAT HELP PREVENT FALLS:    DRINK PLENTY OF FLUIDS : 2-3 QTS PER DAY : URINE SHOULD BE LIGHT COLOR  GET REGULAR EYE CHECK UP  USE NIGHT LIGHTS  ELIMINATE ALL THROW RUGS  DO SOME WALKING EVERY DAY AND BALANCE EXERCISES  USE NON SLIP FOOT WEARBATH MATS AND GRAB BARS HELP  WHEN RISING: ALWAYS WAIT FOR 15-20 SECONDS BEFORE INITIATING WALK  CONSIDER DOING [facility] OR [facility]     WE RECOMMEND GETTING THE 2- DOSE SHINGLES VACCINE ([address]) FROM YOUR PHARMACY  CHECK WITH THEM ON THE COST  YOU SHOULD HAVE THIS IF OVER AGE 48, EVEN IF YOU HAVE HAD THE ORIGINAL VACCINE (ZOSTRIX)

## 2019-12-12 NOTE — PROGRESS NOTES
Assessment/Plan:    No problem-specific Assessment & Plan notes found for this encounter  Diagnoses and all orders for this visit:    Immunization due    BMI 25 0-25 9,adult    Diverticulitis    Malignant neoplasm of female breast, unspecified estrogen receptor status, unspecified laterality, unspecified site of breast ([facility] [address] )    Cervical stenosis of spinal canal    Other orders  -     Cancel: influenza vaccine, [id number], high-dose, PF 0 5 mL (FLUZONE HIGH-DOSE)          Subjective:      Patient ID: Raymond Lilly is a [de-identified] y o  female  PATIENT RETURNS FOR FOLLOW-UP OF CHRONIC MEDICAL CONDITIONS  NO HOSPITAL STAYS OR EMERGENCY VISITS RECENTLY  MEDS WERE REVIEWED AND NO SIDE EFFECTS  NO NEW ISSUES  UNLESS NOTED BELOW  NO NEW MEDICAL PROVIDER REPORTED  THE CHRONIC DISEASES LISTED ABOVE ARE STABLE AND UNCHANGED/ THE PLAN OF CARE FOR THOSE WILL REMAIN UNCHANGED UNLESS NOTED BELOW  AWV: sub       The following portions of the patient's history were reviewed and updated as appropriate: allergies, current medications, past family history, past medical history, past social history, past surgical history and problem list     Review of Systems   Constitutional: Negative for activity change and appetite change  HENT: Negative for voice change  Eyes: Negative for visual disturbance  Respiratory: Negative for chest tightness and shortness of breath  Cardiovascular: Negative for chest pain, palpitations and leg swelling  Gastrointestinal: Negative for abdominal pain, blood in stool, constipation and diarrhea  Genitourinary: Negative for dysuria, vaginal bleeding and vaginal discharge  Skin: Negative for rash  Neurological: Negative for dizziness  Psychiatric/Behavioral: Negative for dysphoric mood           Objective:  Vitals:    12/12/19 0929   BP: 130/74   BP Location: Left arm   Patient Position: Sitting   Cuff Size: Adult   Pulse: 85   Temp: 98 5 °F (36 9 °C)   TempSrc: Temporal   SpO2: 98% Weight: 75 5 kg (166 lb 6 4 oz)   Height: 5' 8" (1 727 m)      Physical Exam   Constitutional: She appears well-developed and well-nourished  HENT:   Head: Normocephalic and atraumatic  Eyes: Conjunctivae are normal    Neck: Neck supple  No thyromegaly present  Cardiovascular: Normal rate, regular rhythm, normal heart sounds and intact distal pulses  No murmur heard  Pulmonary/Chest: Effort normal and breath sounds normal  No respiratory distress  Musculoskeletal: She exhibits no edema  Lymphadenopathy:     She has no cervical adenopathy  Skin: Skin is warm and dry  Psychiatric: She has a normal mood and affect  Her behavior is normal          Patient's chronic problems that were reviewed today are stable  Recent hospital stays reviewed  Recent labs and imaging reviewed  Recent visits to other providers reviewed  Meds reviewed and no changes made  Appropriate labs and imaging were ordered  Preventive measures appropriate for age and sex were reviewed with patient  Immunizations were updated as appropriate  Assessment and Plan:     Problem List Items Addressed This Visit        Other    Diverticulitis    Malignant neoplasm of female breast ([address] )    Cervical stenosis of spinal canal      Other Visit Diagnoses     Immunization due    -  Primary    BMI 25 0-25 9,adult               Preventive health issues were discussed with patient, and age appropriate screening tests were ordered as noted in patient's After Visit Summary  Personalized health advice and appropriate referrals for health education or preventive services given if needed, as noted in patient's After Visit Summary       History of Present Illness:     Patient presents for Medicare Annual Wellness visit    Patient Care Team:  Dianna Yeboah MD as PCP - General  Mor Nice PA-C     Problem List:     Patient Active Problem List   Diagnosis    Microscopic hematuria    Increased frequency of urination    Diverticulitis    Screening cholesterol level    Malignant neoplasm of female breast ([facility] )    Cervical stenosis of spinal canal    Chest pain      Past Medical and Surgical History:     Past Medical History:   Diagnosis Date    Bladder polyps     Breast cancer ([facility] )     Left    Cholelithiasis     GERD (gastroesophageal reflux disease)     Hematuria      Past Surgical History:   Procedure Laterality Date    APPENDECTOMY      BREAST SURGERY Left     Mastectomy    CHOLECYSTECTOMY      CYSTOSCOPY        Family History:     Family History   Problem Relation Age of Onset    Lymphoma Mother     Heart disease Father     Pancreatic cancer Sister       Social History:     Social History     Socioeconomic History    Marital status: /Civil Union     Spouse name: None    Number of children: None    Years of education: None    Highest education level: None   Occupational History    None   Social Needs    Financial resource strain: None    Food insecurity:     Worry: None     Inability: None    Transportation needs:     Medical: None     Non-medical: None   Tobacco Use    Smoking status: Never Smoker    Smokeless tobacco: Never Used    Tobacco comment: Per [employer]: Yes, cigarettes   Substance and Sexual Activity    Alcohol use: Yes     Frequency: 2-3 times a week     Comment: SOCIAL DRINKER    Drug use: No    Sexual activity: None   Lifestyle    Physical activity:     Days per week: None     Minutes per session: None    Stress: None   Relationships    Social connections:     Talks on phone: None     Gets together: None     Attends Caodaism service: None     Active member of club or organization: None     Attends meetings of clubs or organizations: None     Relationship status: None    Intimate partner violence:     Fear of current or ex partner: None     Emotionally abused: None     Physically abused: None     Forced sexual activity: None   Other Topics Concern    None   Social History Narrative    None Medications and Allergies:     Current Outpatient Medications   Medication Sig Dispense Refill    acetaminophen (TYLENOL) 100 mg/mL solution Take 10 mg/kg by mouth every 4 (four) hours as needed for fever      Ascorbic Acid (VITAMIN C) 1000 MG tablet Take by mouth daily       B-Complex-C CAPS Take by mouth daily       magnesium 30 MG tablet Take 30 mg by mouth 2 (two) times a day      Multiple Vitamin (MULTI-VITAMIN DAILY) TABS Take by mouth      omeprazole (PriLOSEC) 40 MG capsule Take 1 capsule (40 mg total) by mouth daily 30 capsule 5     No current facility-administered medications for this visit  Allergies   Allergen Reactions    No Active Allergies       Immunizations:     Immunization History   Administered Date(s) Administered    INFLUENZA 11/16/2005, 11/04/2016    Influenza Split High Dose Preservative Free IM 11/04/2016    Pneumococcal Conjugate 13-Valent 08/23/2018    Pneumococcal Polysaccharide PPV23 12/20/2004    Zoster 03/10/2011, 07/01/2013      Health Maintenance: There are no preventive care reminders to display for this patient  Topic Date Due    DTaP,Tdap,and Td Vaccines (1 - Tdap) 08/18/1951      Medicare Health Risk Assessment:     /74 (BP Location: Left arm, Patient Position: Sitting, Cuff Size: Adult)   Pulse 85   Temp 98 5 °F (36 9 °C) (Temporal)   Ht 5' 8" (1 727 m)   Wt 75 5 kg (166 lb 6 4 oz)   SpO2 98%   BMI 25 30 kg/m²      Aida Pires is here for her Subsequent Wellness visit  Last Medicare Wellness visit information reviewed, patient interviewed, no change since last AWV  Depression Screening:   PHQ-2 Score: 0      Previous Hospitalizations:   Any hospitalizations or ED visits within the last 12 months?: No      Advance Care Planning:   Living will: Yes    Durable POA for healthcare:  Yes    Advanced directive: Yes      Cognitive Screening:   Provider or family/friend/caregiver concerned regarding cognition?: No    PREVENTIVE SCREENINGS Cardiovascular Screening:    General: Screening Current      Diabetes Screening:     General: Screening Current      Colorectal Cancer Screening:     General: Screening Not Indicated      Breast Cancer Screening:     General: History Breast Cancer and Risks and Benefits Discussed      Cervical Cancer Screening:    General: Screening Not Indicated      Osteoporosis Screening:    General: Risks and Benefits Discussed    Due for: Bone Density Ultrasound      Abdominal Aortic Aneurysm (AAA) Screening:        General: Screening Not Indicated      Lung Cancer Screening:     General: Screening Not Indicated      Hepatitis C Screening:    General: Screening Not Indicated    Other Counseling Topics:   Regular weightbearing exercise         Alvarado Jaramillo MD

## 2019-12-12 NOTE — PROGRESS NOTES
BMI Counseling: There is no height or weight on file to calculate BMI  The BMI is above normal  Nutrition recommendations include reducing portion sizes, decreasing overall calorie intake, 3-5 servings of fruits/vegetables daily, reducing fast food intake, consuming healthier snacks, decreasing soda and/or juice intake, moderation in carbohydrate intake, increasing intake of lean protein, reducing intake of saturated fat and trans fat and reducing intake of cholesterol  Exercise recommendations include exercising 3-5 times per week  Assessment and Plan:     Problem List Items Addressed This Visit     None      Visit Diagnoses     Immunization due    -  Primary           Preventive health issues were discussed with patient, and age appropriate screening tests were ordered as noted in patient's After Visit Summary  Personalized health advice and appropriate referrals for health education or preventive services given if needed, as noted in patient's After Visit Summary       History of Present Illness:     Patient presents for Medicare Annual Wellness visit    Patient Care Team:  Zena Holley MD as PCP - General  Harsh Denis PA-C     Problem List:     Patient Active Problem List   Diagnosis    Microscopic hematuria    Increased frequency of urination    Diverticulitis    Screening cholesterol level    Malignant neoplasm of female breast ([facility] )    Cervical stenosis of spinal canal    Chest pain      Past Medical and Surgical History:     Past Medical History:   Diagnosis Date    Bladder polyps     Breast cancer ([facility] )     Left    Cholelithiasis     GERD (gastroesophageal reflux disease)     Hematuria      Past Surgical History:   Procedure Laterality Date    APPENDECTOMY      BREAST SURGERY Left     Mastectomy    CHOLECYSTECTOMY      CYSTOSCOPY        Family History:     Family History   Problem Relation Age of Onset    Lymphoma Mother     Heart disease Father     Pancreatic cancer Sister Social History:     Social History     Socioeconomic History    Marital status: /Civil Union     Spouse name: None    Number of children: None    Years of education: None    Highest education level: None   Occupational History    None   Social Needs    Financial resource strain: None    Food insecurity:     Worry: None     Inability: None    Transportation needs:     Medical: None     Non-medical: None   Tobacco Use    Smoking status: Never Smoker    Smokeless tobacco: Never Used    Tobacco comment: Per [employer]: Yes, cigarettes   Substance and Sexual Activity    Alcohol use: Yes     Frequency: 2-3 times a week     Comment: SOCIAL DRINKER    Drug use: No    Sexual activity: None   Lifestyle    Physical activity:     Days per week: None     Minutes per session: None    Stress: None   Relationships    Social connections:     Talks on phone: None     Gets together: None     Attends Buddhist service: None     Active member of club or organization: None     Attends meetings of clubs or organizations: None     Relationship status: None    Intimate partner violence:     Fear of current or ex partner: None     Emotionally abused: None     Physically abused: None     Forced sexual activity: None   Other Topics Concern    None   Social History Narrative    None       Medications and Allergies:     Current Outpatient Medications   Medication Sig Dispense Refill    acetaminophen (TYLENOL) 100 mg/mL solution Take 10 mg/kg by mouth every 4 (four) hours as needed for fever      Ascorbic Acid (VITAMIN C) 1000 MG tablet Take by mouth daily       B-Complex-C CAPS Take by mouth daily       magnesium 30 MG tablet Take 30 mg by mouth 2 (two) times a day      Multiple Vitamin (MULTI-VITAMIN DAILY) TABS Take by mouth      omeprazole (PriLOSEC) 40 MG capsule Take 1 capsule (40 mg total) by mouth daily 30 capsule 5     No current facility-administered medications for this visit        Allergies   Allergen Reactions    No Active Allergies       Immunizations:     Immunization History   Administered Date(s) Administered    INFLUENZA 11/16/2005, 11/04/2016    Influenza Split High Dose Preservative Free IM 11/04/2016    Pneumococcal Conjugate 13-Valent 08/23/2018    Pneumococcal Polysaccharide PPV23 12/20/2004    Zoster 03/10/2011, 07/01/2013      Health Maintenance: There are no preventive care reminders to display for this patient  Topic Date Due    DTaP,Tdap,and Td Vaccines (1 - Tdap) 08/18/1951      Medicare Health Risk Assessment:     There were no vitals taken for this visit  Khalif Mack is here for her Initial Wellness visit  Last Medicare Wellness visit information reviewed, patient interviewed and updates made to the record today  Health Risk Assessment:   Patient rates overall health as good  Patient feels that their physical health rating is same  Eyesight was rated as same  Hearing was rated as slightly worse  Patient feels that their emotional and mental health rating is same  Pain experienced in the last 7 days has been some  Patient's pain rating has been 5/10  Patient states that she has experienced no weight loss or gain in last 6 months  Depression Screening:   PHQ-2 Score: 0      Fall Risk Screening: In the past year, patient has experienced: no history of falling in past year      Urinary Incontinence Screening:   Patient has not leaked urine accidently in the last six months  Home Safety:  Patient does not have trouble with stairs inside or outside of their home  Patient has working smoke alarms and has working carbon monoxide detector  Home safety hazards include: loose rugs on the floor  Nutrition:   Current diet is Regular  Medications:   Patient is currently taking over-the-counter supplements  OTC medications include: see medication list  Patient is able to manage medications       Activities of Daily Living (ADLs)/Instrumental Activities of Daily Living (IADLs):   Walk and transfer into and out of bed and chair?: Yes  Dress and groom yourself?: Yes    Bathe or shower yourself?: Yes    Feed yourself?  Yes  Do your laundry/housekeeping?: Yes  Manage your money, pay your bills and track your expenses?: Yes  Make your own meals?: Yes    Do your own shopping?: Yes    Previous Hospitalizations:   Any hospitalizations or ED visits within the last 12 months?: Yes    How many hospitalizations have you had in the last year?: 1-2    PREVENTIVE SCREENINGS      Cardiovascular Screening:    General: Screening Current      Breast Cancer Screening:     General: History Breast Cancer      Cervical Cancer Screening:    General: Screening Not Indicated      Azar Jose MD

## 2020-01-03 ENCOUNTER — EVALUATION (OUTPATIENT)
Dept: PHYSICAL THERAPY | Age: 80
End: 2020-01-03
Payer: MEDICARE

## 2020-01-03 DIAGNOSIS — Z00.00 MEDICARE ANNUAL WELLNESS VISIT, SUBSEQUENT: ICD-10-CM

## 2020-01-03 DIAGNOSIS — M25.552 LEFT HIP PAIN: Primary | ICD-10-CM

## 2020-01-03 DIAGNOSIS — M25.551 RIGHT HIP PAIN: ICD-10-CM

## 2020-01-03 PROCEDURE — 97162 PT EVAL MOD COMPLEX 30 MIN: CPT | Performed by: PHYSICAL THERAPIST

## 2020-01-03 NOTE — PROGRESS NOTES
PT Evaluation     Today's date: 1/3/2020  Patient name: Joanna Sanchez  : 1940  MRN: [id number]  Referring provider: Spike Kwok MD  Dx:   Encounter Diagnosis     ICD-10-CM    1  Left hip pain M25 552    2  Right hip pain M25 551    3  Medicare annual wellness visit, subsequent Z00 00 Ambulatory referral to Physical Therapy                  Assessment  Assessment details: PT IE: 2020  Patient reported she has right > left lateral hip, thigh and knee pain  Patient denies recent diagnostic testing  Patient noted she has had 2 plus years of bilateral hip pain  Patient noted walking and stair climbing is limited by right > left hip pain aggravation  Patient reported her bilateral thigh pain intensity is intermittent and worse in the am   Patient noted she will get intermittent bouts of sharp pain  Patient noted she will have intermittent bouts of sharp pain with chair transfers  Patient noted sleep remains deprived with side lying position pain aggravating  Patient noted she is unable to resume walking as a recreational activity  Patient noted use of chair yoga class and stretching as pain reducing  Impairments: abnormal gait, abnormal or restricted ROM, abnormal movement, impaired physical strength and pain with function  Understanding of Dx/Px/POC: excellent   Prognosis: good  Prognosis details: Patient is a [de-identified]y o  year old female seen for outpatient PT evaluation with pain, mobility and functional deficits due to bilateral hip pain  Patient presents to PT IE with the following problems, concerns, deficits and impairments: bilateral hip pain, decreased bilateral le range of motion, decreased bilateral le strength, + TTP, gait and stair dysfunctions, transfer dysfunctions, + special tests of right hip, functional limitations and decreased tolerance to activity    Patient would benefit from skilled PT services under the following PT treatment plan to address the above noted deficits: therapeutic exercises and activities to facilitate bilateral le rom and strength, modalities, manual therapy techniques, postural reeducation and strengthening, balance and proprioception activities, IASTM techniques, Kinesio taping techniques and a hep  Thank you for the referral      Goals  Short Term goals 3 - 4 weeks  1  Patient will be independent HEP  2   Patient will report a 25 - 50% decrease in pain complaints  3   Increase strength 1/2 grade  4   Increase ROM 5-10 degrees  Long Term goals 6 - 8 weeks  1  Patient will report elimination of pain complaints  2   Patient will return to all recreational activities without restriction  3   ROM WFL  4   Strength 5/5   5   Patient will report Sleep improved by > 25 % to assist with pain reduction and house hold activity improvements  6   Patient will report Walking improved by > 25% to improve her ability to perform standing based house hold activities  7   Patient will Stair climbing improved by > 25 % so pt can move between the floors of her home with less pain, easier and more frequently to perform house hold iadls  8   Patient will report Side lying positional improvements that will improve her sleeping  9   Patient will report Recreational activities improved by > 25 % to improve her health and well being        Plan  Patient would benefit from: skilled physical therapy  Planned modality interventions: cryotherapy, TENS, thermotherapy: hydrocollator packs and unattended electrical stimulation  Planned therapy interventions: joint mobilization, manual therapy, massage, aquatic therapy, balance/weight bearing training, balance, neuromuscular re-education, patient education, postural training, body mechanics training, compression, self care, strengthening, stretching, therapeutic activities, therapeutic exercise, therapeutic training, flexibility, functional ROM exercises, gait training, graded activity, graded exercise, graded motor, home exercise program and transfer training  Frequency: 2x week  Duration in weeks: 8        Subjective Evaluation    History of Present Illness  Mechanism of injury: PMHx is remarkable for Breast Cancer with left Mastectomy, cervical spine stenosis, GERD and CP / Angina  Pain  At best pain ratin  At worst pain ratin  Location: bilateral lateral hip thigh and knee pain    Patient Goals  Patient goals for therapy: decreased pain, increased motion, increased strength and return to sport/leisure activities          Objective     Tenderness     Additional Tenderness Details  Patient is + moderate to severe TTP at right Greater trochanter region and minimal to moderate TTP at right ITB and left ITB and greater trochanter regions  Active Range of Motion   Left Hip   Flexion: 110 degrees   Abduction: 24 degrees   External rotation (prone): 45 degrees   Internal rotation (prone): 50 degrees     Right Hip   Flexion: 102 degrees with pain  Abduction: 14 degrees with pain  External rotation (prone): 28 degrees with pain  Internal rotation (prone): 40 degrees   Left Knee   Flexion: 118 degrees   Extension: -2 degrees   Extensor la degrees     Right Knee   Flexion: 124 degrees   Extension: 0 degrees   Extensor la degrees   Left Ankle/Foot   Dorsiflexion (ke): 14 degrees   Plantar flexion: 60 degrees     Right Ankle/Foot   Dorsiflexion (ke): 10 degrees   Plantar flexion: 64 degrees     Strength/Myotome Testing     Left Hip   Planes of Motion   Flexion: 4  Extension: 4  Abduction: 4  Adduction: 4+    Right Hip   Planes of Motion   Flexion: 4-  Extension: 4  Abduction: 4-  Adduction: 4+    Left Knee   Flexion: 4  Extension: 4    Right Knee   Flexion: 4  Extension: 4    Left Ankle/Foot   Dorsiflexion: 4  Plantar flexion: 5    Right Ankle/Foot   Dorsiflexion: 4+  Plantar flexion: 5    Tests     Left Hip   Positive piriformis  Right Hip   Positive Gilson, piriformis and shola  Ramsey: Positive       Ambulation Ambulation: Level Surfaces   Ambulation without assistive device: independent    Additional Level Surfaces Ambulation Details  Patient ambulates with antalgic gait pattern of decrease in right stance and left swing phase with decrease in right hip extension with stance phase  Ambulation: Stairs   Ascend stairs: independent  Pattern: reciprocal  Railings: one rail  Descend stairs: independent  Pattern: reciprocal  Railings: two rails             Precautions: PMHx is remarkable for Breast Cancer with left Mastectomy, cervical spine stenosis, GERD and CP / Angina        Manual  1/3/20            IASTM to right lateral hip, thigh and knee along ITB             MWM hip flexion with lateral hip glide with belt fixation                                                        Exercise Diary  1/3/20            Recumbent bike             Standing ITB stretch:B:             Standing hip flexor stretch off 2 nd step:B:             Standing back bends             LAQ:B:             Hip flexion:B:             Supine hamstring stretch:B:             Piriformis stretch:B:             LTR:B:             slr flexion:B:             Bridges                          Hip abduction in side lying:B:             Hip adduction in side lying:B:             Prone hip extension:B:             Prone hip IR and ER:B:             Prone knee flexion stretch strap:B:                          Mini squats             Lunges:B:             Hip hiking:B:             Forward step ups:B 8"            Lateral step ups:B: 8"             Step downs:B: 6"                             Modalities

## 2020-01-06 ENCOUNTER — TELEPHONE (OUTPATIENT)
Dept: FAMILY MEDICINE CLINIC | Facility: CLINIC | Age: 80
End: 2020-01-06

## 2020-01-06 NOTE — TELEPHONE ENCOUNTER
So long as no significant arm swelling : have on of the CRNP see her jamel'r when I am in office so we can "co attend" if necessary

## 2020-01-06 NOTE — TELEPHONE ENCOUNTER
Patient called stating she is having pain on left arm  Under the arm pit to elbow  Patient has had a mastectomy before and is concerned  She is wondering who she should be seeing regarding this

## 2020-01-08 ENCOUNTER — OFFICE VISIT (OUTPATIENT)
Dept: PHYSICAL THERAPY | Age: 80
End: 2020-01-08
Payer: MEDICARE

## 2020-01-08 DIAGNOSIS — M25.551 RIGHT HIP PAIN: ICD-10-CM

## 2020-01-08 DIAGNOSIS — M25.552 LEFT HIP PAIN: Primary | ICD-10-CM

## 2020-01-08 PROCEDURE — 97110 THERAPEUTIC EXERCISES: CPT | Performed by: PHYSICAL THERAPIST

## 2020-01-08 PROCEDURE — 97140 MANUAL THERAPY 1/> REGIONS: CPT | Performed by: PHYSICAL THERAPIST

## 2020-01-08 NOTE — PROGRESS NOTES
Daily Note     Today's date: 2020  Patient name: Maddi Aguilar  : 1940  MRN: [id number]  Referring provider: Olga Delong MD  Dx:   Encounter Diagnosis     ICD-10-CM    1  Left hip pain M25 552    2  Right hip pain M25 551                   Subjective: Patient reported bilateral hip pain at minimal to moderate levels but she noted hep is helpful in short term pain reduction  Objective: See treatment diary below  Assessment: Tolerated treatment well  Patient demonstrated fatigue post treatment as well as all right hip arom is limited by pain aggravation thus addition of open kinetic chain activities since weight baring activities remain limited by pain aggravation  Plan: Continue per plan of care  Precautions: PMHx is remarkable for Breast Cancer with left Mastectomy, cervical spine stenosis, GERD and CP / Angina        Manual  1/3/20 1/8           IASTM to right lateral hip, thigh and knee along ITB  10 min total           MWM Right hip flexion with lateral hip glide with belt fixation  10 min total                                                      Exercise Diary  1/3/20 1/8           Recumbent bike  10 min           Standing ITB stretch:B:  20 sec x 5           Standing hip flexor stretch off 2 nd step:B:  20 sec x 5           Standing back bends  10 x            LAQ:B:  NT           Hip flexion:B:  NT           Supine hamstring stretch:B:  NT           Piriformis stretch:B:  NT           LTR:B:  NT           slr flexion:B:  2 x 10           Bridges  3 sec 2 x 10                        Hip abduction in side lying:B:  NT           Hip adduction in side lying:B:  NT           Prone hip extension:B:  NT           Prone hip IR and ER:B:  2 x 10           Prone knee flexion stretch strap:B:  manual 20 sec x 5           Prone lying  2 min           Mini squats  NT           Lunges:B:  NT           Hip hiking:B:  NT           Forward step ups:B 8" NT           Lateral step ups:B: 8" NT           Step downs:B: 6" NT                            Modalities   1/8           MHP to lumbar spine and bilateral anterior hips while supine  10 min

## 2020-01-10 ENCOUNTER — OFFICE VISIT (OUTPATIENT)
Dept: PHYSICAL THERAPY | Age: 80
End: 2020-01-10
Payer: MEDICARE

## 2020-01-10 DIAGNOSIS — M25.552 LEFT HIP PAIN: Primary | ICD-10-CM

## 2020-01-10 DIAGNOSIS — M25.551 RIGHT HIP PAIN: ICD-10-CM

## 2020-01-10 PROCEDURE — 97140 MANUAL THERAPY 1/> REGIONS: CPT | Performed by: PHYSICAL THERAPIST

## 2020-01-10 PROCEDURE — 97110 THERAPEUTIC EXERCISES: CPT | Performed by: PHYSICAL THERAPIST

## 2020-01-10 NOTE — PROGRESS NOTES
Daily Note     Today's date: 1/10/2020  Patient name: Mikaela Leon  : 1940  MRN: [id number]  Referring provider: Nirmala Neil MD  Dx:   Encounter Diagnosis     ICD-10-CM    1  Left hip pain M25 552    2  Right hip pain M25 551                   Subjective: Patient reported bilateral hip pain at minimal to moderate levels but she noted right hip pain is more than left and right hip weakness due to pain aggravation is > than left  Objective: See treatment diary below  Assessment: Tolerated treatment well  Patient demonstrated fatigue post treatment  Patient presents with right hip distraction as pain reducing and mobility improving  But, right hip ER and hip flexion remains pain aggravating due to pinching type pain aggravation  Thus, continue to promote right le manual therapy techniques to promote pain reduction and mobility improvement that lead to functional progress  Plan: Continue per plan of care  Precautions: PMHx is remarkable for Breast Cancer with left Mastectomy, cervical spine stenosis, GERD and CP / Angina        Manual  1/3/20 1/8 1/10          IASTM to right lateral hip, thigh and knee along ITB  10 min total 10 min total          MWM Right hip flexion with lateral hip glide with belt fixation  10 min total 10 min total                                                     Exercise Diary  1/3/20 1/8 1/10          Recumbent bike  10 min 10 min          Standing ITB stretch:B:  20 sec x 5 20 sec x 5          Standing hip flexor stretch off 2 nd step:B:  20 sec x 5 10 sec x 5          Standing back bends  10 x  10 x          LAQ:B:  NT NT          Hip flexion:B:  NT NT          Supine hamstring stretch:B:  NT NT          Piriformis stretch:B:  NT NT          LTR:B:  NT NT          slr flexion:B:  2 x 10 2 x 10          Bridges  3 sec 2 x 10 2 x 10                       Hip abduction in side lying:B:  NT NT          Hip adduction in side lying:B:  NT NT          Prone hip extension:B:  NT NT          Prone hip IR and ER:B:  2 x 10 2 x 10          Prone knee flexion stretch strap:B:  manual 20 sec x 5 manual 20 sec x 5          Prone lying  2 min 2 min          Mini squats  NT NT          Lunges:B:  NT NT          Hip hiking:B:  NT NT          Forward step ups:B 8" NT NT          Lateral step ups:B: 8"  NT NT          Step downs:B: 6" NT NT                           Modalities   1/8 1/10          MHP to lumbar spine and bilateral anterior hips while supine  10 min 10 min

## 2020-01-14 ENCOUNTER — OFFICE VISIT (OUTPATIENT)
Dept: PHYSICAL THERAPY | Age: 80
End: 2020-01-14
Payer: MEDICARE

## 2020-01-14 DIAGNOSIS — M25.552 LEFT HIP PAIN: Primary | ICD-10-CM

## 2020-01-14 DIAGNOSIS — M25.551 RIGHT HIP PAIN: ICD-10-CM

## 2020-01-14 PROCEDURE — 97140 MANUAL THERAPY 1/> REGIONS: CPT | Performed by: PHYSICAL THERAPIST

## 2020-01-14 PROCEDURE — 97110 THERAPEUTIC EXERCISES: CPT | Performed by: PHYSICAL THERAPIST

## 2020-01-14 NOTE — PROGRESS NOTES
Daily Note     Today's date: 2020  Patient name: Michael Newby  : 1940  MRN: [id number]  Referring provider: Bertin French MD  Dx:   Encounter Diagnosis     ICD-10-CM    1  Left hip pain M25 552    2  Right hip pain M25 551                   Subjective: Patient reported bilateral hip pain persists at minimal to moderate levels with right hip pain > left hip pain and she noted left and right hip weakness persists  Objective: See treatment diary below  Assessment: Tolerated treatment well  Patient demonstrated fatigue post treatment  Patient presents with right hip distraction as pain reducing and mobility improving  But, right hip ER and hip flexion remains pain aggravating due to pinching type pain aggravation  Thus, continue to promote right le manual therapy techniques to promote pain reduction and mobility improvement that lead to functional progress  Plan: Continue per plan of care  Precautions: PMHx is remarkable for Breast Cancer with left Mastectomy, cervical spine stenosis, GERD and CP / Angina        Manual  1/3/20 1/8 1/10 1/14         IASTM to right lateral hip, thigh and knee along ITB  10 min total 10 min total 15 min total         MWM Right hip flexion, and ER with lateral hip glide with belt fixation and manual right le distraction  10 min total 10 min total 15 min total                                                    Exercise Diary  1/3/20 1/8 1/10 1/14         Recumbent bike  10 min 10 min 10 min         Standing ITB stretch:B:  20 sec x 5 20 sec x 5 20 sec x 5         Standing hip flexor stretch off 2 nd step:B:  20 sec x 5 10 sec x 5 10 sec x 5         Standing back bends  10 x  10 x 10 x          LAQ:B:  NT NT NT         Hip flexion:B:  NT NT NT         Supine hamstring stretch:B:  NT NT NT         Piriformis stretch:B:  NT NT NT         LTR:B:  NT NT NT         slr flexion:B:  2 x 10 2 x 10 2 x 10         Bridges  3 sec 2 x 10 2 x 10 2 x 10 Hip abduction in side lying:B:  NT NT NT         Hip adduction in side lying:B:  NT NT NT         Prone TKE:B:    2 x 10         Prone hip extension:B:  NT NT 2 x10         Prone hip IR and ER:B:  2 x 10 2 x 10 2 x 10         Prone knee flexion stretch strap:B:  manual 20 sec x 5 manual 20 sec x 5 NT         Prone lying  2 min 2 min 2 min         Mini squats  NT NT 2 x 10         Lunges:B:  NT NT 2 x 10         Hip hiking:B:  NT NT 2 x 10         Forward step ups:B 8" NT NT NT         Lateral step ups:B: 8"  NT NT NT         Step downs:B: 6" NT NT NT                          Modalities   1/8 1/10 1/14         MHP to lumbar spine and bilateral anterior hips while supine  10 min 10 min 10 min

## 2020-01-16 ENCOUNTER — OFFICE VISIT (OUTPATIENT)
Dept: PHYSICAL THERAPY | Age: 80
End: 2020-01-16
Payer: MEDICARE

## 2020-01-16 DIAGNOSIS — M25.551 RIGHT HIP PAIN: ICD-10-CM

## 2020-01-16 DIAGNOSIS — M25.552 LEFT HIP PAIN: Primary | ICD-10-CM

## 2020-01-16 PROCEDURE — 97140 MANUAL THERAPY 1/> REGIONS: CPT | Performed by: PHYSICAL THERAPIST

## 2020-01-16 PROCEDURE — 97110 THERAPEUTIC EXERCISES: CPT | Performed by: PHYSICAL THERAPIST

## 2020-01-16 NOTE — PROGRESS NOTES
Daily Note     Today's date: 2020  Patient name: Raymond Lilly  : 1940  MRN: [id number]  Referring provider: Inga Cruz MD  Dx:   Encounter Diagnosis     ICD-10-CM    1  Left hip pain M25 552    2  Right hip pain M25 551                   Subjective: Patient reported bilateral right hip pain remains aggravated more than left with standing based functional activities and movements during transfers  Patient noted clicking with right hip rotation and flexion movements  Objective: See treatment diary below  Assessment: Tolerated treatment well  Patient demonstrated fatigue post treatment  Patient presents with crepitus with hip mobility and compression while distraction alleviates pain reduction  Patient presents with + for right hip symptom presentation with scour and pain is reduced with HEENA stretch / stretching  Plan: Continue per plan of care  Precautions: PMHx is remarkable for Breast Cancer with left Mastectomy, cervical spine stenosis, GERD and CP / Angina        Manual  1/3/20 1/8 1/10 1/14 1/16        IASTM to right lateral hip, thigh and knee along ITB  10 min total 10 min total 15 min total 15 min total        MWM Right hip flexion, and ER with lateral hip glide with belt fixation and manual right le distraction  10 min total 10 min total 15 min total 15 min total                                                   Exercise Diary  1/3/20 1/8 1/10 1/14 1/16        Recumbent bike  10 min 10 min 10 min 10 min        Standing ITB stretch:B:  20 sec x 5 20 sec x 5 20 sec x 5 20 sec x 5        Standing hip flexor stretch off 2 nd step:B:  20 sec x 5 10 sec x 5 10 sec x 5 20 sec  x5        Standing back bends  10 x  10 x 10 x          LAQ:B:  NT NT NT NT        Hip flexion:B:  NT NT NT NT        Supine hamstring stretch:B:  NT NT NT NT        Piriformis stretch:B:  NT NT NT NT        LTR:B:  NT NT NT NT        slr flexion:B:  2 x 10 2 x 10 2 x 10 2# x 20        Bridges  3 sec 2 x 10 2 x 10 2 x 10 2 x 10                     Hip abduction in side lying:B:  NT NT NT NT        Hip adduction in side lying:B:  NT NT NT NT        Prone TKE:B:    2 x 10         Prone hip extension:B:  NT NT 2 x10 2# x 20        Prone hip IR and ER:B:  2 x 10 2 x 10 2 x 10 2# x 20        Prone knee flexion stretch strap:B:  manual 20 sec x 5 manual 20 sec x 5 NT manual 20 sec x 5        Prone lying  2 min 2 min 2 min 2 min        Mini squats  NT NT 2 x 10 2 x 10        Lunges:B:  NT NT 2 x 10 2 x 10        Hip hiking:B:  NT NT 2 x 10 2 x 10        Forward step ups:B 8" NT NT NT 2 x 10        Lateral step ups:B: 8"  NT NT NT NT        Step downs:B: 6" NT NT NT NT                         Modalities   1/8 1/10 1/14 1/16        MHP to lumbar spine and bilateral anterior hips while supine  10 min 10 min 10 min 10 min

## 2020-01-21 ENCOUNTER — OFFICE VISIT (OUTPATIENT)
Dept: PHYSICAL THERAPY | Age: 80
End: 2020-01-21
Payer: MEDICARE

## 2020-01-21 DIAGNOSIS — M25.551 RIGHT HIP PAIN: ICD-10-CM

## 2020-01-21 DIAGNOSIS — M25.552 LEFT HIP PAIN: Primary | ICD-10-CM

## 2020-01-21 PROCEDURE — 97110 THERAPEUTIC EXERCISES: CPT | Performed by: PHYSICAL THERAPIST

## 2020-01-21 PROCEDURE — 97140 MANUAL THERAPY 1/> REGIONS: CPT | Performed by: PHYSICAL THERAPIST

## 2020-01-21 NOTE — PROGRESS NOTES
Daily Note     Today's date: 2020  Patient name: Kari Tracy  : 1940  MRN: [id number]  Referring provider: Fuad Beauchamp MD  Dx:   Encounter Diagnosis     ICD-10-CM    1  Left hip pain M25 552    2  Right hip pain M25 551                   Subjective: Patient reported right > left hip pain persists that she noted she will have intermittent bouts of walking with sharp right hip pain bouts  Objective: See treatment diary below  Assessment: Tolerated treatment well  Patient demonstrated fatigue post treatment  Patient presents with joint approximation of right hip pain aggravating while right hip distraction as pain reducing  Patient continues to exhibit ER and hip flexion as the most pain aggravating movements of the right hip that aggravates pain  Plan: Continue per plan of care  Precautions: PMHx is remarkable for Breast Cancer with left Mastectomy, cervical spine stenosis, GERD and CP / Angina        Manual  1/3/20 1/8 1/10 1/14 1/16 1/21       IASTM to right lateral hip, thigh and knee along ITB  10 min total 10 min total 15 min total 15 min total 15 min total       MWM Right hip flexion, and ER with lateral hip glide with belt fixation and manual right le distraction  10 min total 10 min total 15 min total 15 min total 15 min total                                                  Exercise Diary  1/3/20 1/8 1/10 1/14 1/16 1/21       Recumbent bike  10 min 10 min 10 min 10 min 10 min       Standing ITB stretch:B:  20 sec x 5 20 sec x 5 20 sec x 5 20 sec x 5 20 sec x 5       Standing hip flexor stretch off 2 nd step:B:  20 sec x 5 10 sec x 5 10 sec x 5 20 sec  x5 20 sec x 5       Standing back bends  10 x  10 x 10 x  NT NT       LAQ:B:  NT NT NT NT NT       Hip flexion:B:  NT NT NT NT NT       Supine hamstring stretch:B:  NT NT NT NT NT       Piriformis stretch:B:  NT NT NT NT Hold due to pain aggravation       LTR:B:  NT NT NT NT 10 sec x 5       slr flexion:B:  2 x 10 2 x 10 2 x 10 2# x 20 2# x 20       Bridges  3 sec 2 x 10 2 x 10 2 x 10 2 x 10 3 sec x 20                    Hip abduction in side lying:B:  NT NT NT NT NT       Hip adduction in side lying:B:  NT NT NT NT NT       Prone TKE:B:    2 x 10  NT       Prone hip extension:B:  NT NT 2 x10 2# x 20 2# x 20       Prone hip IR and ER:B:  2 x 10 2 x 10 2 x 10 2# x 20 2# x 20       Prone knee flexion stretch strap:B:  manual 20 sec x 5 manual 20 sec x 5 NT manual 20 sec x 5 manual 20 sec x 5       Prone lying  2 min 2 min 2 min 2 min 2 min       Mini squats  NT NT 2 x 10 2 x 10 2 x 10       Lunges:B:  NT NT 2 x 10 2 x 10 2 x 10       Hip hiking:B:  NT NT 2 x 10 2 x 10 2 x 10       Forward step ups:B 8" NT NT NT 2 x 10 2 x 10 on blue foam       Lateral step ups:B: 8"  NT NT NT NT NT       Step downs:B: 6" NT NT NT NT NT                        Modalities   1/8 1/10 1/14 1/16 1/21       MHP to lumbar spine and bilateral anterior hips while supine  10 min 10 min 10 min 10 min 10 min

## 2020-01-23 ENCOUNTER — OFFICE VISIT (OUTPATIENT)
Dept: PHYSICAL THERAPY | Age: 80
End: 2020-01-23
Payer: MEDICARE

## 2020-01-23 DIAGNOSIS — M25.551 RIGHT HIP PAIN: ICD-10-CM

## 2020-01-23 DIAGNOSIS — M25.552 LEFT HIP PAIN: Primary | ICD-10-CM

## 2020-01-23 DIAGNOSIS — Z00.00 MEDICARE ANNUAL WELLNESS VISIT, SUBSEQUENT: ICD-10-CM

## 2020-01-23 PROCEDURE — 97110 THERAPEUTIC EXERCISES: CPT

## 2020-01-23 PROCEDURE — 97140 MANUAL THERAPY 1/> REGIONS: CPT

## 2020-01-23 NOTE — PROGRESS NOTES
Daily Note     Today's date: 2020  Patient name: Ron Abraham  : 1940  MRN: [id number]  Referring provider: Kenny Mcgrath MD  Dx:   Encounter Diagnosis     ICD-10-CM    1  Left hip pain M25 552    2  Right hip pain M25 551    3  Medicare annual wellness visit, subsequent Z00 00                   Subjective:  Pt reported R>L today  Objective: See treatment diary below  Assessment: No pain after today's session  Plan: Continue per plan of care  Precautions: PMHx is remarkable for Breast Cancer with left Mastectomy, cervical spine stenosis, GERD and CP / Angina        Manual  1/3/20 1/8 1/10 1/14 1/16 1/21 1/23      IASTM to right lateral hip, thigh and knee along ITB  10 min total 10 min total 15 min total 15 min total 15 min total 10 min       MWM Right hip flexion, and ER with lateral hip glide with belt fixation and manual right le distraction  10 min total 10 min total 15 min total 15 min total 15 min total NT       Man resistance IR/ER prone to R hip        10 min                                      Exercise Diary  1/3/20 1/8 1/10 1/14 1/16 1/21 1/23      Recumbent bike  10 min 10 min 10 min 10 min 10 min 10 min      Standing ITB stretch:B:  20 sec x 5 20 sec x 5 20 sec x 5 20 sec x 5 20 sec x 5 NT       Standing hip flexor stretch off 2 nd step:B:  20 sec x 5 10 sec x 5 10 sec x 5 20 sec  x5 20 sec x 5 NT       Standing back bends  10 x  10 x 10 x  NT NT NT       LAQ:B:  NT NT NT NT NT NT       Hip flexion:B:  NT NT NT NT NT NT       Supine hamstring stretch:B:  NT NT NT NT NT 20sec 5x                     LTR:B:  NT NT NT NT 10 sec x 5 10x 3sec       slr flexion:B:  2 x 10 2 x 10 2 x 10 2# x 20 2# x 20 20x       Bridges  3 sec 2 x 10 2 x 10 2 x 10 2 x 10 3 sec x 20 20x                    Hip abduction in side lying:B:  NT NT NT NT NT 20x with pillow       Hip adduction in side lying:B:  NT NT NT NT NT NT       Prone TKE:B:    2 x 10  NT NT       Prone hip extension:B:  NT NT 2 x10 2# x 20 2# x 20 10x       Prone knee flexion B        20x       Prone hip IR and ER:B:  2 x 10 2 x 10 2 x 10 2# x 20 2# x 20 20x       Prone knee flexion stretch strap:B:  manual 20 sec x 5 manual 20 sec x 5 NT manual 20 sec x 5 manual 20 sec x 5 20sec 5x       Prone lying  2 min 2 min 2 min 2 min 2 min NT       Mini squats  NT NT 2 x 10 2 x 10 2 x 10 NT       Lunges:B:  NT NT 2 x 10 2 x 10 2 x 10 NT       Hip hiking:B:  NT NT 2 x 10 2 x 10 2 x 10 NT       Forward step ups:B 8" NT NT NT 2 x 10 2 x 10 on blue foam NT       Lateral step ups:B: 8"  NT NT NT NT NT NT       Step downs:B: 6" NT NT NT NT NT NT                        Modalities   1/8 1/10 1/14 1/16 1/21 1/23      MHP to lumbar spine and bilateral anterior hips while supine  10 min 10 min 10 min 10 min 10 min S/L R ITB with pillow between B LE

## 2020-01-30 ENCOUNTER — APPOINTMENT (OUTPATIENT)
Dept: PHYSICAL THERAPY | Age: 80
End: 2020-01-30
Payer: MEDICARE

## 2020-03-03 ENCOUNTER — HOSPITAL ENCOUNTER (OUTPATIENT)
Dept: RADIOLOGY | Age: 80
Discharge: HOME | End: 2020-03-03
Payer: MEDICARE

## 2020-03-03 DIAGNOSIS — Z00.00 MEDICARE ANNUAL WELLNESS VISIT, SUBSEQUENT: ICD-10-CM

## 2020-03-03 PROCEDURE — 77080 DXA BONE DENSITY AXIAL: CPT

## 2020-06-26 ENCOUNTER — TELEPHONE (OUTPATIENT)
Dept: FAMILY MEDICINE CLINIC | Facility: CLINIC | Age: 80
End: 2020-06-26

## 2020-07-20 ENCOUNTER — TELEPHONE (OUTPATIENT)
Dept: FAMILY MEDICINE CLINIC | Facility: CLINIC | Age: 80
End: 2020-07-20

## 2020-07-27 ENCOUNTER — LAB REQUISITION (OUTPATIENT)
Dept: LAB | Facility: HOSPITAL | Age: 80
End: 2020-07-27
Payer: MEDICARE

## 2020-07-27 DIAGNOSIS — K21.9 GASTRO-ESOPHAGEAL REFLUX DISEASE WITHOUT ESOPHAGITIS: ICD-10-CM

## 2020-07-27 LAB
BACTERIA UR QL AUTO: ABNORMAL /HPF
BILIRUB UR QL STRIP: NEGATIVE
CHOLEST SERPL-MCNC: 226 MG/DL (ref 50–200)
CLARITY UR: CLEAR
COLOR UR: YELLOW
GLUCOSE UR STRIP-MCNC: NEGATIVE MG/DL
HDLC SERPL-MCNC: 72 MG/DL
HGB UR QL STRIP.AUTO: ABNORMAL
HYALINE CASTS #/AREA URNS LPF: ABNORMAL /LPF
KETONES UR STRIP-MCNC: NEGATIVE MG/DL
LDLC SERPL CALC-MCNC: 136 MG/DL (ref 0–100)
LEUKOCYTE ESTERASE UR QL STRIP: ABNORMAL
NITRITE UR QL STRIP: NEGATIVE
NON-SQ EPI CELLS URNS QL MICRO: ABNORMAL /HPF
NONHDLC SERPL-MCNC: 154 MG/DL
PH UR STRIP.AUTO: 5.5 [PH]
PROT UR STRIP-MCNC: NEGATIVE MG/DL
RBC #/AREA URNS AUTO: ABNORMAL /HPF
SP GR UR STRIP.AUTO: 1.02 (ref 1–1.03)
TRIGL SERPL-MCNC: 88 MG/DL
UROBILINOGEN UR QL STRIP.AUTO: 0.2 E.U./DL
WBC #/AREA URNS AUTO: ABNORMAL /HPF

## 2020-07-27 PROCEDURE — 81001 URINALYSIS AUTO W/SCOPE: CPT | Performed by: INTERNAL MEDICINE

## 2020-07-27 PROCEDURE — 80061 LIPID PANEL: CPT | Performed by: INTERNAL MEDICINE

## 2020-07-28 ENCOUNTER — TELEPHONE (OUTPATIENT)
Dept: FAMILY MEDICINE CLINIC | Facility: CLINIC | Age: 80
End: 2020-07-28

## 2020-07-28 NOTE — TELEPHONE ENCOUNTER
Patient is calling because she requesting vasotomy breast bra please review thanks       Fax [phone] Brenda Arthur

## 2020-08-03 DIAGNOSIS — C50.919 MALIGNANT NEOPLASM OF FEMALE BREAST, UNSPECIFIED ESTROGEN RECEPTOR STATUS, UNSPECIFIED LATERALITY, UNSPECIFIED SITE OF BREAST (HCC): Primary | ICD-10-CM

## 2020-09-16 ENCOUNTER — TELEPHONE (OUTPATIENT)
Dept: FAMILY MEDICINE CLINIC | Facility: CLINIC | Age: 80
End: 2020-09-16

## 2020-09-16 ENCOUNTER — TREATMENT (OUTPATIENT)
Dept: FAMILY MEDICINE CLINIC | Facility: CLINIC | Age: 80
End: 2020-09-16
Payer: MEDICARE

## 2020-09-16 ENCOUNTER — TELEPHONE (OUTPATIENT)
Dept: OTHER | Facility: OTHER | Age: 80
End: 2020-09-16

## 2020-09-16 DIAGNOSIS — R50.9 FEVER, UNSPECIFIED FEVER CAUSE: ICD-10-CM

## 2020-09-16 DIAGNOSIS — R50.9 FEVER, UNSPECIFIED FEVER CAUSE: Primary | ICD-10-CM

## 2020-09-16 PROCEDURE — 99212 OFFICE O/P EST SF 10 MIN: CPT | Performed by: FAMILY MEDICINE

## 2020-09-16 PROCEDURE — U0003 INFECTIOUS AGENT DETECTION BY NUCLEIC ACID (DNA OR RNA); SEVERE ACUTE RESPIRATORY SYNDROME CORONAVIRUS 2 (SARS-COV-2) (CORONAVIRUS DISEASE [COVID-19]), AMPLIFIED PROBE TECHNIQUE, MAKING USE OF HIGH THROUGHPUT TECHNOLOGIES AS DESCRIBED BY CMS-2020-01-R: HCPCS | Performed by: FAMILY MEDICINE

## 2020-09-16 NOTE — TELEPHONE ENCOUNTER
ALEJANDRO WILL BE AVAILABLE THIS AFTERNOON  HER CELL Dawn Fair -[phone] she will try to do [employer]  If she can't her land line is [phone]

## 2020-09-16 NOTE — PROGRESS NOTES
COVID-19 Virtual Visit     Assessment/Plan:  Expedited testing for Covid ordered     Problem List Items Addressed This Visit     None      Visit Diagnoses     Fever, unspecified fever cause    -  Primary    Relevant Orders    Novel Coronavirus (COVID-19), PCR [facility] - Collected at [employer] or [facility]        This virtual check-in was done via telephone  and patient was informed that this is not a secure, HIPAA-complaint platform  She agrees to proceed     Disposition:      I referred Minnie Hammans to one of our centralized sites for a COVID-19 swab    I spent 15 minutes directly with the patient during this visit    Encounter provider Maggi Mitchell MD    Provider located at [address] [place] [id number]    Recent Visits  No visits were found meeting these conditions  Showing recent visits within past 7 days and meeting all other requirements     Today's Visits  Date Type Provider Dept   09/16/20 Telephone Alexandria Jamison [facility]    [facility] today's visits and meeting all other requirements     Future Appointments  No visits were found meeting these conditions  Showing future appointments within next 150 days and meeting all other requirements        Patient agrees to participate in a virtual check in via telephone or video visit instead of presenting to the office to address urgent/immediate medical needs  Patient is aware this is a billable service  After connecting through telephone, the patient was identified by name and date of birth  Arturo Azevdeo was informed that this was a telemedicine visit and that the exam was being conducted confidentially over secure lines  My office door was closed  No one else was in the room  Arturo Azevedo acknowledged consent and understanding of privacy and security of the telemedicine visit  I informed the patient that I have reviewed her record in Epic and presented the opportunity for her to ask any questions regarding the visit today  The patient agreed to participate  Subjective  Angie Carrillo is a 79 y o  female who is concerned about COVID-19  She reports fever  She has not experienced chills She has not had contact with a person who is under investigation for or who is positive for COVID-19 within the last 14 days  She has not been hospitalized recently for fever and/or lower respiratory symptoms  Past Medical History:   Diagnosis Date    Bladder polyps     Breast cancer ([facility] )     Left    Cholelithiasis     GERD (gastroesophageal reflux disease)     Hematuria        Past Surgical History:   Procedure Laterality Date    APPENDECTOMY      BREAST SURGERY Left     Mastectomy    CHOLECYSTECTOMY      CYSTOSCOPY         Current Outpatient Medications   Medication Sig Dispense Refill    acetaminophen (TYLENOL) 100 mg/mL solution Take 10 mg/kg by mouth every 4 (four) hours as needed for fever      Ascorbic Acid (VITAMIN C) 1000 MG tablet Take by mouth daily       B-Complex-C CAPS Take by mouth daily       magnesium 30 MG tablet Take 30 mg by mouth 2 (two) times a day      Multiple Vitamin (MULTI-VITAMIN DAILY) TABS Take by mouth      omeprazole (PriLOSEC) 40 MG capsule Take 1 capsule (40 mg total) by mouth daily 30 capsule 5     No current facility-administered medications for this visit  Allergies   Allergen Reactions    No Active Allergies        Review of Systems   HENT: Negative for congestion, rhinorrhea and trouble swallowing  Respiratory: Negative for cough, chest tightness, shortness of breath and wheezing  No loss of taste/ smell        Video Exam    There were no vitals filed for this visit  Nancy Delgado appears alert  Physical Exam     Patient with 24 hour history of fever chills and fatigue       is recuperating from recent hip replacement and will be coming home today  Possibly tomorrow  This patient needs expedited testing given the family situation      VIRTUAL VISIT DISCLAIMER    Radha Singh acknowledges that she has consented to an online visit or consultation  She understands that the online visit is based solely on information provided by her, and that, in the absence of a face-to-face physical evaluation by the physician, the diagnosis she receives is both limited and provisional in terms of accuracy and completeness  This is not intended to replace a full medical face-to-face evaluation by the physician  Radha Singh understands and accepts these terms

## 2020-09-16 NOTE — TELEPHONE ENCOUNTER
I will call her early this afternoon please be available    Let me know what visual platform we should use

## 2020-09-16 NOTE — TELEPHONE ENCOUNTER
Pt is calling stating she has a fever of 100  Has body aches and fatigue  Pt  had a hip replacement is being d/c today  She is afraid for him to come home because of her symptoms  There are no virtual appts left for Dr Fransico Marlow  Please advise

## 2020-09-17 LAB — SARS-COV-2 RNA SPEC QL NAA+PROBE: NOT DETECTED

## 2020-10-28 ENCOUNTER — TELEPHONE (OUTPATIENT)
Dept: FAMILY MEDICINE CLINIC | Facility: CLINIC | Age: 80
End: 2020-10-28

## 2020-12-16 ENCOUNTER — TELEPHONE (OUTPATIENT)
Dept: FAMILY MEDICINE CLINIC | Facility: CLINIC | Age: 80
End: 2020-12-16

## 2021-01-18 ENCOUNTER — IMMUNIZATIONS (OUTPATIENT)
Dept: FAMILY MEDICINE CLINIC | Facility: HOSPITAL | Age: 81
End: 2021-01-18

## 2021-01-18 DIAGNOSIS — Z23 ENCOUNTER FOR IMMUNIZATION: Primary | ICD-10-CM

## 2021-01-18 PROCEDURE — 91300 SARS-COV-2 / COVID-19 MRNA VACCINE (PFIZER-BIONTECH) 30 MCG: CPT

## 2021-01-18 PROCEDURE — 0001A SARS-COV-2 / COVID-19 MRNA VACCINE (PFIZER-BIONTECH) 30 MCG: CPT

## 2021-02-08 ENCOUNTER — TELEPHONE (OUTPATIENT)
Dept: FAMILY MEDICINE CLINIC | Facility: CLINIC | Age: 81
End: 2021-02-08

## 2021-02-08 DIAGNOSIS — Z23 ENCOUNTER FOR IMMUNIZATION: Primary | ICD-10-CM

## 2021-02-08 NOTE — TELEPHONE ENCOUNTER
Patient called would like an order faxed to [facility] for her Shingles vaccine  Fax #  [phone]  Please advise on order

## 2021-02-09 ENCOUNTER — IMMUNIZATIONS (OUTPATIENT)
Dept: FAMILY MEDICINE CLINIC | Facility: HOSPITAL | Age: 81
End: 2021-02-09

## 2021-02-09 DIAGNOSIS — Z23 ENCOUNTER FOR IMMUNIZATION: Primary | ICD-10-CM

## 2021-02-09 PROCEDURE — 0002A SARS-COV-2 / COVID-19 MRNA VACCINE (PFIZER-BIONTECH) 30 MCG: CPT

## 2021-02-09 PROCEDURE — 91300 SARS-COV-2 / COVID-19 MRNA VACCINE (PFIZER-BIONTECH) 30 MCG: CPT

## 2021-03-29 ENCOUNTER — OFFICE VISIT (OUTPATIENT)
Dept: FAMILY MEDICINE CLINIC | Facility: CLINIC | Age: 81
End: 2021-03-29
Payer: MEDICARE

## 2021-03-29 VITALS
SYSTOLIC BLOOD PRESSURE: 118 MMHG | HEIGHT: 67 IN | OXYGEN SATURATION: 99 % | HEART RATE: 97 BPM | BODY MASS INDEX: 26.84 KG/M2 | DIASTOLIC BLOOD PRESSURE: 62 MMHG | TEMPERATURE: 98.4 F | WEIGHT: 171 LBS

## 2021-03-29 DIAGNOSIS — K57.92 DIVERTICULITIS: ICD-10-CM

## 2021-03-29 DIAGNOSIS — C50.919 MALIGNANT NEOPLASM OF FEMALE BREAST, UNSPECIFIED ESTROGEN RECEPTOR STATUS, UNSPECIFIED LATERALITY, UNSPECIFIED SITE OF BREAST (HCC): Primary | ICD-10-CM

## 2021-03-29 PROBLEM — K21.9 GASTROESOPHAGEAL REFLUX DISEASE WITHOUT ESOPHAGITIS: Chronic | Status: ACTIVE | Noted: 2021-03-29

## 2021-03-29 PROCEDURE — 99214 OFFICE O/P EST MOD 30 MIN: CPT | Performed by: FAMILY MEDICINE

## 2021-03-29 PROCEDURE — G0439 PPPS, SUBSEQ VISIT: HCPCS | Performed by: FAMILY MEDICINE

## 2021-03-29 PROCEDURE — 1123F ACP DISCUSS/DSCN MKR DOCD: CPT | Performed by: FAMILY MEDICINE

## 2021-03-29 RX ORDER — FAMOTIDINE 20 MG/1
20 TABLET, FILM COATED ORAL DAILY
COMMUNITY
Start: 2021-02-15

## 2021-03-29 NOTE — PROGRESS NOTES
Assessment and Plan:     Problem List Items Addressed This Visit     None           Preventive health issues were discussed with patient, and age appropriate screening tests were ordered as noted in patient's After Visit Summary  Personalized health advice and appropriate referrals for health education or preventive services given if needed, as noted in patient's After Visit Summary       History of Present Illness:     Patient presents for Medicare Annual Wellness visit    Patient Care Team:  Janet Rachel MD as PCP - General Francisco Zambrano PA-C     Problem List:     Patient Active Problem List   Diagnosis    Microscopic hematuria    Increased frequency of urination    Diverticulitis    Screening cholesterol level    [facility]    Cervical stenosis of spinal canal    Chest pain      Past Medical and Surgical History:     Past Medical History:   Diagnosis Date    Bladder polyps     [facility]     Left    Cholelithiasis     GERD (gastroesophageal reflux disease)     Hematuria      Past Surgical History:   Procedure Laterality Date    APPENDECTOMY      BREAST SURGERY Left     Mastectomy    CHOLECYSTECTOMY      CYSTOSCOPY        Family History:     Family History   Problem Relation Age of Onset    Lymphoma Mother     Heart disease Father     Pancreatic cancer Sister       Social History:     E-Cigarette/Vaping    E-Cigarette Use Never User      E-Cigarette/Vaping Substances    Nicotine No     THC No     CBD No     Flavoring No     Other No     Unknown No      Social History     Socioeconomic History    Marital status: /Civil Union     Spouse name: None    Number of children: None    Years of education: None    Highest education level: None   Occupational History    None   Social Needs    Financial resource strain: None    Food insecurity     Worry: None     Inability: None    Transportation needs     Medical: None     Non-medical: None Tobacco Use    Smoking status: Never Smoker    Smokeless tobacco: Never Used    Tobacco comment: no exposure to passive smoke   Substance and Sexual Activity    Alcohol use: Yes     Frequency: 2-3 times a week     Comment: SOCIAL DRINKER    Drug use: No    Sexual activity: None   Lifestyle    Physical activity     Days per week: None     Minutes per session: None    Stress: None   Relationships    Social connections     Talks on phone: None     Gets together: None     Attends [employer] service: None     Active member of club or organization: None     Attends meetings of clubs or organizations: None     Relationship status: None    Intimate partner violence     Fear of current or ex partner: None     Emotionally abused: None     Physically abused: None     Forced sexual activity: None   Other Topics Concern    None   Social History Narrative    None      Medications and Allergies:     Current Outpatient Medications   Medication Sig Dispense Refill    acetaminophen (TYLENOL) 100 mg/mL solution Take 10 mg/kg by mouth every 4 (four) hours as needed for fever      Ascorbic Acid (VITAMIN C) 1000 MG tablet Take by mouth daily       B-Complex-C CAPS Take by mouth daily       famotidine (PEPCID) 20 mg tablet       magnesium 30 MG tablet Take 30 mg by mouth 2 (two) times a day      Multiple Vitamin (MULTI-VITAMIN DAILY) TABS Take by mouth      omeprazole (PriLOSEC) 40 MG capsule Take 1 capsule (40 mg total) by mouth daily 30 capsule 5     No current facility-administered medications for this visit        Allergies   Allergen Reactions    No Active Allergies       Immunizations:     Immunization History   Administered Date(s) Administered    INFLUENZA 11/16/2005, 11/04/2016    Influenza Split High Dose Preservative Free IM 11/04/2016    Influenza, high dose seasonal 0 7 mL 11/16/2020    Pneumococcal Conjugate 13-Valent 08/23/2018    Pneumococcal Polysaccharide PPV23 12/20/2004    SARS-CoV-2 / COVID-19 mRNA IM (Pfizer-BioNTech) 01/18/2021, 02/09/2021    Zoster 03/10/2011, 07/01/2013      Health Maintenance:         Topic Date Due    Colonoscopy Surveillance  05/24/2022         Topic Date Due    DTaP,Tdap,and Td Vaccines (1 - Tdap) Never done      Medicare Health Risk Assessment:     /62 (BP Location: Left arm, Patient Position: Sitting, Cuff Size: Large)   Pulse 97   Temp 98 4 °F (36 9 °C) (Temporal)   Ht 5' 7" (1 702 m)   Wt 77 6 kg (171 lb)   SpO2 99%   BMI 26 78 kg/m²      Tracy Clark is here for her Subsequent Wellness visit  Health Risk Assessment:   Patient rates overall health as good  Patient feels that their physical health rating is same  Patient is satisfied with their life  Eyesight was rated as slightly worse  Hearing was rated as slightly worse  Patient feels that their emotional and mental health rating is same  Patients states they are sometimes angry  Patient states they are sometimes unusually tired/fatigued  Pain experienced in the last 7 days has been none  Patient states that she has experienced no weight loss or gain in last 6 months  Depression Screening:   PHQ-2 Score: 0      Fall Risk Screening: In the past year, patient has experienced: no history of falling in past year      Urinary Incontinence Screening:   Patient has not leaked urine accidently in the last six months  Home Safety:  Patient does not have trouble with stairs inside or outside of their home  Patient has working smoke alarms and has working carbon monoxide detector  Home safety hazards include: none  Nutrition:   Current diet is Regular  Medications:   Patient is currently taking over-the-counter supplements  OTC medications include: see medication list  Patient is able to manage medications       Activities of Daily Living (ADLs)/Instrumental Activities of Daily Living (IADLs):   Walk and transfer into and out of bed and chair?: Yes  Dress and groom yourself?: Yes    Bathe or shower yourself?: Yes    Feed yourself?  Yes  Do your laundry/housekeeping?: Yes  Manage your money, pay your bills and track your expenses?: Yes  Make your own meals?: Yes    Do your own shopping?: Yes    Previous Hospitalizations:   Any hospitalizations or ED visits within the last 12 months?: No      Advance Care Planning:   Living will: Yes    Advanced directive: Yes      PREVENTIVE SCREENINGS      Cardiovascular Screening:    General: Screening Current      Breast Cancer Screening:     General: History Breast Cancer      Cervical Cancer Screening:    General: Screening Not Indicated      Lung Cancer Screening:     General: Screening Not Indicated    Screening, Brief Intervention, and Referral to Treatment (SBIRT)    Screening  Typical number of drinks in a day: 0    Single Item Drug Screening:  How often have you used an illegal drug (including marijuana) or a prescription medication for non-medical reasons in the past year? never    Single Item Drug Screen Score: 0  Interpretation: Negative screen for possible drug use disorder      Giorgio Carter MD

## 2021-03-29 NOTE — LETTER
Chayito Porras  1940      To whom it may concern:      RX:    Recombinant Shingles Vaccine series                             DILLAN Mercado

## 2021-03-29 NOTE — PATIENT INSTRUCTIONS
Medicare Preventive Visit Patient Instructions  Thank you for completing your Welcome to Medicare Visit or Medicare Annual Wellness Visit today  Your next wellness visit will be due in one year (3/30/2022)  The screening/preventive services that you may require over the next 5-10 years are detailed below  Some tests may not apply to you based off risk factors and/or age  Screening tests ordered at today's visit but not completed yet may show as past due  Also, please note that scanned in results may not display below  Preventive Screenings:  Service Recommendations Previous Testing/Comments   Colorectal Cancer Screening  * Colonoscopy    * Fecal Occult Blood Test (FOBT)/Fecal Immunochemical Test (FIT)  * Fecal DNA/Cologuard Test  * Flexible Sigmoidoscopy Age: 54-65 years old   Colonoscopy: every 10 years (may be performed more frequently if at higher risk)  OR  FOBT/FIT: every 1 year  OR  Cologuard: every 3 years  OR  Sigmoidoscopy: every 5 years  Screening may be recommended earlier than age 48 if at higher risk for colorectal cancer  Also, an individualized decision between you and your healthcare provider will decide whether screening between the ages of 74-80 would be appropriate  Colonoscopy: 05/24/2017  FOBT/FIT: Not on file  Cologuard: Not on file  Sigmoidoscopy: Not on file    Screening Not Indicated  Screening Current     Breast Cancer Screening Age: 36 years old  Frequency: every 1-2 years  Not required if history of left and right mastectomy Mammogram: 10/16/2018    History Breast Cancer  History Breast Cancer  Screening Current   Cervical Cancer Screening Between the ages of 21-29, pap smear recommended once every 3 years  Between the ages of 33-67, can perform pap smear with HPV co-testing every 5 years     Recommendations may differ for women with a history of total hysterectomy, cervical cancer, or abnormal pap smears in past  Pap Smear: Not on file    Screening Not Indicated  Screening Not Indicated   Hepatitis C Screening Once for adults born between 1945 and 1965  More frequently in patients at high risk for Hepatitis C Hep C Antibody: Not on file    Screening Not Indicated   Diabetes Screening 1-2 times per year if you're at risk for diabetes or have pre-diabetes Fasting glucose: 82 mg/dL   A1C: No results in last 5 years    Screening Current   Cholesterol Screening Once every 5 years if you don't have a lipid disorder  May order more often based on risk factors  Lipid panel: 07/27/2020    Screening Current  Screening Current     Other Preventive Screenings Covered by Medicare:  1  Abdominal Aortic Aneurysm (AAA) Screening: covered once if your at risk  You're considered to be at risk if you have a family history of AAA  2  Lung Cancer Screening: covers low dose CT scan once per year if you meet all of the following conditions: (1) Age 50-69; (2) No signs or symptoms of lung cancer; (3) Current smoker or have quit smoking within the last 15 years; (4) You have a tobacco smoking history of at least 30 pack years (packs per day multiplied by number of years you smoked); (5) You get a written order from a healthcare provider  3  Glaucoma Screening: covered annually if you're considered high risk: (1) You have diabetes OR (2) Family history of glaucoma OR (3)  aged 48 and older OR (3)  American aged 72 and older  3  Osteoporosis Screening: covered every 2 years if you meet one of the following conditions: (1) You're estrogen deficient and at risk for osteoporosis based off medical history and other findings; (2) Have a vertebral abnormality; (3) On glucocorticoid therapy for more than 3 months; (4) Have primary hyperparathyroidism; (5) On osteoporosis medications and need to assess response to drug therapy  · Last bone density test (DXA Scan): 03/03/2020   5  HIV Screening: covered annually if you're between the age of 15-65   Also covered annually if you are younger than 13 and older than 72 with risk factors for HIV infection  For pregnant patients, it is covered up to 3 times per pregnancy  Immunizations:  Immunization Recommendations   Influenza Vaccine Annual influenza vaccination during flu season is recommended for all persons aged >= 6 months who do not have contraindications   Pneumococcal Vaccine (Prevnar and Pneumovax)  * Prevnar = PCV13  * Pneumovax = PPSV23   Adults 25-60 years old: 1-3 doses may be recommended based on certain risk factors  Adults 72 years old: Prevnar (PCV13) vaccine recommended followed by Pneumovax (PPSV23) vaccine  If already received PPSV23 since turning 65, then PCV13 recommended at least one year after PPSV23 dose  Hepatitis B Vaccine 3 dose series if at intermediate or high risk (ex: diabetes, end stage renal disease, liver disease)   Tetanus (Td) Vaccine - COST NOT COVERED BY MEDICARE PART B Following completion of primary series, a booster dose should be given every 10 years to maintain immunity against tetanus  Td may also be given as tetanus wound prophylaxis  Tdap Vaccine - COST NOT COVERED BY MEDICARE PART B Recommended at least once for all adults  For pregnant patients, recommended with each pregnancy  Shingles Vaccine (Shingrix) - COST NOT COVERED BY MEDICARE PART B  2 shot series recommended in those aged 48 and above     Health Maintenance Due:      Topic Date Due    Colonoscopy Surveillance  05/24/2022     Immunizations Due:      Topic Date Due    DTaP,Tdap,and Td Vaccines (1 - Tdap) Never done     Advance Directives   What are advance directives? Advance directives are legal documents that state your wishes and plans for medical care  These plans are made ahead of time in case you lose your ability to make decisions for yourself  Advance directives can apply to any medical decision, such as the treatments you want, and if you want to donate organs  What are the types of advance directives?   There are many types of advance directives, and each state has rules about how to use them  You may choose a combination of any of the following:  · Living will: This is a written record of the treatment you want  You can also choose which treatments you do not want, which to limit, and which to stop at a certain time  This includes surgery, medicine, IV fluid, and tube feedings  · Durable power of  for healthcare [facility]): This is a written record that states who you want to make healthcare choices for you when you are unable to make them for yourself  This person, called a proxy, is usually a family member or a friend  You may choose more than 1 proxy  · Do not resuscitate (DNR) order:  A DNR order is used in case your heart stops beating or you stop breathing  It is a request not to have certain forms of treatment, such as CPR  A DNR order may be included in other types of advance directives  · Medical directive: This covers the care that you want if you are in a coma, near death, or unable to make decisions for yourself  You can list the treatments you want for each condition  Treatment may include pain medicine, surgery, blood transfusions, dialysis, IV or tube feedings, and a ventilator (breathing machine)  · Values history: This document has questions about your views, beliefs, and how you feel and think about life  This information can help others choose the care that you would choose  Why are advance directives important? An advance directive helps you control your care  Although spoken wishes may be used, it is better to have your wishes written down  Spoken wishes can be misunderstood, or not followed  Treatments may be given even if you do not want them  An advance directive may make it easier for your family to make difficult choices about your care     Weight Management   Why it is important to manage your weight:  Being overweight increases your risk of health conditions such as heart disease, high blood pressure, type 2 diabetes, and certain types of cancer  It can also increase your risk for osteoarthritis, sleep apnea, and other respiratory problems  Aim for a slow, steady weight loss  Even a small amount of weight loss can lower your risk of health problems  How to lose weight safely:  A safe and healthy way to lose weight is to eat fewer calories and get regular exercise  You can lose up about 1 pound a week by decreasing the number of calories you eat by 500 calories each day  Healthy meal plan for weight management:  A healthy meal plan includes a variety of foods, contains fewer calories, and helps you stay healthy  A healthy meal plan includes the following:  · Eat whole-grain foods more often  A healthy meal plan should contain fiber  Fiber is the part of grains, fruits, and vegetables that is not broken down by your body  Whole-grain foods are healthy and provide extra fiber in your diet  Some examples of whole-grain foods are whole-wheat breads and pastas, oatmeal, brown rice, and bulgur  · Eat a variety of vegetables every day  Include dark, leafy greens such as spinach, kale, buster greens, and mustard greens  Eat yellow and orange vegetables such as carrots, sweet potatoes, and winter squash  · Eat a variety of fruits every day  Choose fresh or canned fruit (canned in its own juice or light syrup) instead of juice  Fruit juice has very little or no fiber  · Eat low-fat dairy foods  Drink fat-free (skim) milk or 1% milk  Eat fat-free yogurt and low-fat cottage cheese  Try low-fat cheeses such as mozzarella and other reduced-fat cheeses  · Choose meat and other protein foods that are low in fat  Choose beans or other legumes such as split peas or lentils  Choose fish, skinless poultry (chicken or turkey), or lean cuts of red meat (beef or pork)  Before you cook meat or poultry, cut off any visible fat  · Use less fat and oil  Try baking foods instead of frying them   Add less fat, such as margarine, sour cream, regular salad dressing and mayonnaise to foods  Eat fewer high-fat foods  Some examples of high-fat foods include french fries, doughnuts, ice cream, and cakes  · Eat fewer sweets  Limit foods and drinks that are high in sugar  This includes candy, cookies, regular soda, and sweetened drinks  Exercise:  Exercise at least 30 minutes per day on most days of the week  Some examples of exercise include walking, biking, dancing, and swimming  You can also fit in more physical activity by taking the stairs instead of the elevator or parking farther away from stores  Ask your healthcare provider about the best exercise plan for you  © Copyright [employer] 2018 Information is for End User's use only and may not be sold, redistributed or otherwise used for commercial purposes   All illustrations and images included in CareNotes® are the copyrighted property of A D A M [employer]  or [address] [place] [address]

## 2021-03-29 NOTE — PROGRESS NOTES
BMI Counseling: Body mass index is 26 78 kg/m²  The BMI is above normal  Nutrition recommendations include reducing portion sizes, decreasing overall calorie intake, 3-5 servings of fruits/vegetables daily, reducing fast food intake, consuming healthier snacks, decreasing soda and/or juice intake, moderation in carbohydrate intake, increasing intake of lean protein, reducing intake of saturated fat and trans fat and reducing intake of cholesterol  Exercise recommendations include moderate aerobic physical activity for 150 minutes/week and exercising 3-5 times per week

## 2022-01-06 ENCOUNTER — APPOINTMENT (OUTPATIENT)
Dept: LAB | Facility: CLINIC | Age: 82
End: 2022-01-06
Payer: MEDICARE

## 2022-01-06 DIAGNOSIS — L27.0 DERMATITIS MEDICAMENTOSA DUE TO INGESTED DRUGS: ICD-10-CM

## 2022-01-06 DIAGNOSIS — E03.9 MYXEDEMA HEART DISEASE: ICD-10-CM

## 2022-01-06 DIAGNOSIS — I51.9 MYXEDEMA HEART DISEASE: ICD-10-CM

## 2022-01-06 LAB
ALBUMIN SERPL BCP-MCNC: 3.4 G/DL (ref 3.5–5)
ALP SERPL-CCNC: 63 U/L (ref 46–116)
ALT SERPL W P-5'-P-CCNC: 32 U/L (ref 12–78)
ANION GAP SERPL CALCULATED.3IONS-SCNC: 3 MMOL/L (ref 4–13)
AST SERPL W P-5'-P-CCNC: 22 U/L (ref 5–45)
BASOPHILS # BLD AUTO: 0.11 THOUSANDS/ΜL (ref 0–0.1)
BASOPHILS NFR BLD AUTO: 2 % (ref 0–1)
BILIRUB SERPL-MCNC: 0.86 MG/DL (ref 0.2–1)
BUN SERPL-MCNC: 18 MG/DL (ref 5–25)
CALCIUM ALBUM COR SERPL-MCNC: 9.8 MG/DL (ref 8.3–10.1)
CALCIUM SERPL-MCNC: 9.3 MG/DL (ref 8.3–10.1)
CHLORIDE SERPL-SCNC: 108 MMOL/L (ref 100–108)
CO2 SERPL-SCNC: 28 MMOL/L (ref 21–32)
CREAT SERPL-MCNC: 0.82 MG/DL (ref 0.6–1.3)
EOSINOPHIL # BLD AUTO: 0.66 THOUSAND/ΜL (ref 0–0.61)
EOSINOPHIL NFR BLD AUTO: 10 % (ref 0–6)
ERYTHROCYTE [DISTWIDTH] IN BLOOD BY AUTOMATED COUNT: 14.5 % (ref 11.6–15.1)
GFR SERPL CREATININE-BSD FRML MDRD: 66 ML/MIN/1.73SQ M
GLUCOSE P FAST SERPL-MCNC: 84 MG/DL (ref 65–99)
HCT VFR BLD AUTO: 36 % (ref 34.8–46.1)
HGB BLD-MCNC: 11.4 G/DL (ref 11.5–15.4)
IMM GRANULOCYTES # BLD AUTO: 0.01 THOUSAND/UL (ref 0–0.2)
IMM GRANULOCYTES NFR BLD AUTO: 0 % (ref 0–2)
LYMPHOCYTES # BLD AUTO: 2.19 THOUSANDS/ΜL (ref 0.6–4.47)
LYMPHOCYTES NFR BLD AUTO: 33 % (ref 14–44)
MCH RBC QN AUTO: 29.8 PG (ref 26.8–34.3)
MCHC RBC AUTO-ENTMCNC: 31.7 G/DL (ref 31.4–37.4)
MCV RBC AUTO: 94 FL (ref 82–98)
MONOCYTES # BLD AUTO: 0.67 THOUSAND/ΜL (ref 0.17–1.22)
MONOCYTES NFR BLD AUTO: 10 % (ref 4–12)
NEUTROPHILS # BLD AUTO: 3.04 THOUSANDS/ΜL (ref 1.85–7.62)
NEUTS SEG NFR BLD AUTO: 45 % (ref 43–75)
NRBC BLD AUTO-RTO: 0 /100 WBCS
PLATELET # BLD AUTO: 239 THOUSANDS/UL (ref 149–390)
PMV BLD AUTO: 9.7 FL (ref 8.9–12.7)
POTASSIUM SERPL-SCNC: 4.3 MMOL/L (ref 3.5–5.3)
PROT SERPL-MCNC: 7 G/DL (ref 6.4–8.2)
RBC # BLD AUTO: 3.83 MILLION/UL (ref 3.81–5.12)
SODIUM SERPL-SCNC: 139 MMOL/L (ref 136–145)
TSH SERPL DL<=0.05 MIU/L-ACNC: 3.55 UIU/ML (ref 0.36–3.74)
WBC # BLD AUTO: 6.68 THOUSAND/UL (ref 4.31–10.16)

## 2022-01-06 PROCEDURE — 80053 COMPREHEN METABOLIC PANEL: CPT

## 2022-01-06 PROCEDURE — 36415 COLL VENOUS BLD VENIPUNCTURE: CPT

## 2022-01-06 PROCEDURE — 85025 COMPLETE CBC W/AUTO DIFF WBC: CPT

## 2022-01-06 PROCEDURE — 84443 ASSAY THYROID STIM HORMONE: CPT

## 2022-01-15 ENCOUNTER — IMMUNIZATIONS (OUTPATIENT)
Dept: FAMILY MEDICINE CLINIC | Facility: HOSPITAL | Age: 82
End: 2022-01-15

## 2022-01-15 DIAGNOSIS — Z23 ENCOUNTER FOR IMMUNIZATION: Primary | ICD-10-CM

## 2022-01-15 PROCEDURE — 0001A COVID-19 PFIZER VACC 0.3 ML: CPT

## 2022-01-15 PROCEDURE — 91300 COVID-19 PFIZER VACC 0.3 ML: CPT

## 2022-01-20 ENCOUNTER — TELEMEDICINE (OUTPATIENT)
Dept: FAMILY MEDICINE CLINIC | Facility: CLINIC | Age: 82
End: 2022-01-20
Payer: MEDICARE

## 2022-01-20 VITALS — TEMPERATURE: 98 F

## 2022-01-20 DIAGNOSIS — R05.8 COUGH WITH EXPOSURE TO COVID-19 VIRUS: Primary | ICD-10-CM

## 2022-01-20 DIAGNOSIS — Z20.822 COUGH WITH EXPOSURE TO COVID-19 VIRUS: Primary | ICD-10-CM

## 2022-01-20 LAB — SARS-COV-2 RNA RESP QL NAA+PROBE: POSITIVE

## 2022-01-20 PROCEDURE — U0005 INFEC AGEN DETEC AMPLI PROBE: HCPCS | Performed by: NURSE PRACTITIONER

## 2022-01-20 PROCEDURE — 99442 PR PHYS/QHP TELEPHONE EVALUATION 11-20 MIN: CPT | Performed by: NURSE PRACTITIONER

## 2022-01-20 PROCEDURE — U0003 INFECTIOUS AGENT DETECTION BY NUCLEIC ACID (DNA OR RNA); SEVERE ACUTE RESPIRATORY SYNDROME CORONAVIRUS 2 (SARS-COV-2) (CORONAVIRUS DISEASE [COVID-19]), AMPLIFIED PROBE TECHNIQUE, MAKING USE OF HIGH THROUGHPUT TECHNOLOGIES AS DESCRIBED BY CMS-2020-01-R: HCPCS | Performed by: NURSE PRACTITIONER

## 2022-01-20 NOTE — PROGRESS NOTES
COVID-19 Outpatient Progress Note    Assessment/Plan:    Problem List Items Addressed This Visit     None      Visit Diagnoses     Cough with exposure to COVID-19 virus    -  Primary    Relevant Orders    COVID Only - Office Collect         Disposition:     Recommended patient to come to the office to test for COVID-19  I have spent 15 minutes directly with the patient  Greater than 50% of this time was spent in counseling/coordination of care regarding: instructions for management, patient and family education, importance of treatment compliance, risk factor reductions and impressions  Encounter provider KEARA Gaytan    Provider located at [address] [facility]  [address] [place] [id number]    Recent Visits  No visits were found meeting these conditions  Showing recent visits within past 7 days and meeting all other requirements  Today's Visits  Date Type Provider Dept   01/20/22 Telemedicine Carlos Alberto Reyes, [address]   Showing today's visits and meeting all other requirements  Future Appointments  No visits were found meeting these conditions  Showing future appointments within next 150 days and meeting all other requirements     This virtual check-in was done via telephone and she agrees to proceed  Patient agrees to participate in a virtual check in via telephone or video visit instead of presenting to the office to address urgent/immediate medical needs  Patient is aware this is a billable service  After connecting through Telephone, the patient was identified by name and date of birth  Abelardo Crandall was informed that this was a telemedicine visit and that the exam was being conducted confidentially over secure lines  My office door was closed  No one else was in the room  Abelardo Crandall acknowledged consent and understanding of privacy and security of the telemedicine visit   I informed the patient that I have reviewed her record in Epic and presented the opportunity for her to ask any questions regarding the visit today  The patient agreed to participate  It was my intent to perform this visit via video technology but the patient was not able to do a video connection so the visit was completed via audio telephone only  Verification of patient location:  Patient is located in the following state in which I hold an active license: [place]    Subjective:   Soheila Stratton is a 79 y o  female who is concerned about COVID-19  Patient's symptoms include chills, fatigue, malaise, rhinorrhea, cough, diarrhea, myalgias and headache   Patient denies fever, congestion, sore throat, anosmia, loss of taste, shortness of breath, chest tightness, abdominal pain, nausea and vomiting        - Date of symptom onset: 1/17/2022      COVID-19 vaccination status: Fully vaccinated with booster    Exposure:   Contact with a person who is under investigation (PUI) for or who is positive for COVID-19 within the last 14 days?: No    Hospitalized recently for fever and/or lower respiratory symptoms?: No      Currently a healthcare worker that is involved in direct patient care?: No      Works in a special setting where the risk of COVID-19 transmission may be high? (this may include long-term care, correctional and long term facilities; homeless shelters; assisted-living facilities and group homes ): No      Resident in a special setting where the risk of COVID-19 transmission may be high? (this may include long-term care, correctional and long term facilities; homeless shelters; assisted-living facilities and group homes ): No      Lab Results   Component Value Date    Galen Magallanes Not Detected 09/16/2020     Past Medical History:   Diagnosis Date    Bladder polyps     Breast cancer ([facility] [address] )     Left    Cholelithiasis     GERD (gastroesophageal reflux disease)     Hematuria      Past Surgical History:   Procedure Laterality Date    APPENDECTOMY      BREAST SURGERY Left     Mastectomy    CHOLECYSTECTOMY      CYSTOSCOPY       Current Outpatient Medications   Medication Sig Dispense Refill    acetaminophen (TYLENOL) 100 mg/mL solution Take 10 mg/kg by mouth every 4 (four) hours as needed for fever      Ascorbic Acid (VITAMIN C) 1000 MG tablet Take by mouth daily       B-Complex-C CAPS Take by mouth daily       famotidine (PEPCID) 20 mg tablet       magnesium 30 MG tablet Take 30 mg by mouth 2 (two) times a day       No current facility-administered medications for this visit  Allergies   Allergen Reactions    No Active Allergies        Review of Systems   Constitutional: Positive for chills and fatigue  Negative for fever  HENT: Positive for rhinorrhea  Negative for congestion and sore throat  Respiratory: Positive for cough  Negative for chest tightness and shortness of breath  Gastrointestinal: Positive for diarrhea  Negative for abdominal pain, nausea and vomiting  Musculoskeletal: Positive for myalgias  Neurological: Positive for headaches  Objective:    Vitals:    01/20/22 0940   Temp: 98 °F (36 7 °C)       Physical Exam    VIRTUAL VISIT DISCLAIMER    Michele Vasques verbally agrees to participate in [employer]  Pt is aware that [employer] could be limited without vital signs or the ability to perform a full hands-on physical Greta Garcia understands she or the provider may request at any time to terminate the video visit and request the patient to seek care or treatment in person

## 2022-01-21 ENCOUNTER — TELEMEDICINE (OUTPATIENT)
Dept: FAMILY MEDICINE CLINIC | Facility: CLINIC | Age: 82
End: 2022-01-21
Payer: MEDICARE

## 2022-01-21 DIAGNOSIS — U07.1 COVID-19 VIRUS INFECTION: Primary | ICD-10-CM

## 2022-01-21 PROCEDURE — 99442 PR PHYS/QHP TELEPHONE EVALUATION 11-20 MIN: CPT | Performed by: NURSE PRACTITIONER

## 2022-01-21 NOTE — PROGRESS NOTES
COVID-19 Outpatient Progress Note    Assessment/Plan:    Problem List Items Addressed This Visit     None      Visit Diagnoses     COVID-19 virus infection    -  Primary    Improving         Disposition:     Patient has COVID-19 infection  Based off CDC guidelines, they were recommended to isolate for 5 days from the date of the positive test  If they remain asymptomatic, isolation may be ended followed by 5 days of wearing a mask when around othes to minimize risk of infecting others  If they have a fever, continue to stay home until fever resolves for at least 24 hours  I have spent 15 minutes directly with the patient  Greater than 50% of this time was spent in counseling/coordination of care regarding: prognosis, instructions for management, patient and family education, importance of treatment compliance and impressions  Encounter provider KEARA Byers    Provider located at [facility] AT [address] [phone]    Recent Visits  Date Type Provider Dept   01/20/22 Telemedicine Halima Book, [address]   Showing recent visits within past 7 days and meeting all other requirements  Today's Visits  Date Type Provider Dept   01/21/22 Telemedicine Halima Book, [address]   Showing today's visits and meeting all other requirements  Future Appointments  No visits were found meeting these conditions  Showing future appointments within next 150 days and meeting all other requirements     This virtual check-in was done via telephone and she agrees to proceed  Patient agrees to participate in a virtual check in via telephone or video visit instead of presenting to the office to address urgent/immediate medical needs  Patient is aware this is a billable service  After connecting through Telephone, the patient was identified by name and date of birth   Macdonald Sicard was informed that this was a telemedicine visit and that the exam was being conducted confidentially over secure lines  My office door was closed  No one else was in the room  Gabriella Marroquin acknowledged consent and understanding of privacy and security of the telemedicine visit  I informed the patient that I have reviewed her record in Epic and presented the opportunity for her to ask any questions regarding the visit today  The patient agreed to participate  It was my intent to perform this visit via video technology but the patient was not able to do a video connection so the visit was completed via audio telephone only  Verification of patient location:  Patient is located in the following state in which I hold an active license: [place]    Subjective:   Gabriella Marroquin is a 79 y o  female who has been screened for COVID-19  Symptom change since last report: improving  Patient's symptoms include fatigue, malaise, nasal congestion, cough (improving) and myalgias (improving)  Patient denies fever, chills, rhinorrhea, sore throat, anosmia, loss of taste, shortness of breath, chest tightness, abdominal pain, nausea, vomiting, diarrhea and headaches  - Date of symptom onset: 1/17/2022  - Date of positive COVID-19 test: 1/20/2022  Type of test: PCR  COVID-19 vaccination status: Fully vaccinated with booster    Aliejona David has been staying home and has isolated themselves in her home  She is taking care to not share personal items and is cleaning all surfaces that are touched often, like counters, tabletops, and doorknobs using household cleaning sprays or wipes  She is wearing a mask when she leaves her room       Lab Results   Component Value Date    SARSCOV2 Positive (A) 01/20/2022    SARSCOV2 Not Detected 09/16/2020     Past Medical History:   Diagnosis Date    Bladder polyps     Breast cancer ([address] )     Left    Cholelithiasis     GERD (gastroesophageal reflux disease)     Hematuria      Past Surgical History: Procedure Laterality Date    APPENDECTOMY      BREAST SURGERY Left     Mastectomy    CHOLECYSTECTOMY      CYSTOSCOPY       Current Outpatient Medications   Medication Sig Dispense Refill    acetaminophen (TYLENOL) 100 mg/mL solution Take 10 mg/kg by mouth every 4 (four) hours as needed for fever      Ascorbic Acid (VITAMIN C) 1000 MG tablet Take by mouth daily       B-Complex-C CAPS Take by mouth daily       famotidine (PEPCID) 20 mg tablet       magnesium 30 MG tablet Take 30 mg by mouth 2 (two) times a day       No current facility-administered medications for this visit  Allergies   Allergen Reactions    No Active Allergies        Review of Systems   Constitutional: Positive for fatigue  Negative for chills and fever  HENT: Positive for congestion  Negative for rhinorrhea and sore throat  Respiratory: Positive for cough (improving)  Negative for chest tightness and shortness of breath  Gastrointestinal: Negative for abdominal pain, diarrhea, nausea and vomiting  Musculoskeletal: Positive for myalgias (improving)  Neurological: Negative for headaches  Objective: There were no vitals filed for this visit  Physical Exam    VIRTUAL VISIT DISCLAIMER    Jeane Avina verbally agrees to participate in [employer]  Pt is aware that [employer] could be limited without vital signs or the ability to perform a full hands-on physical Linn Pock understands she or the provider may request at any time to terminate the video visit and request the patient to seek care or treatment in person

## 2022-02-23 ENCOUNTER — TRANSCRIBE ORDERS (OUTPATIENT)
Dept: LAB | Facility: HOSPITAL | Age: 82
End: 2022-02-23

## 2022-02-23 DIAGNOSIS — L40.0 PSORIASIS VULGARIS: Primary | ICD-10-CM

## 2022-02-24 ENCOUNTER — HOSPITAL ENCOUNTER (OUTPATIENT)
Dept: RADIOLOGY | Facility: HOSPITAL | Age: 82
Discharge: HOME | End: 2022-02-24
Payer: MEDICARE

## 2022-02-24 ENCOUNTER — APPOINTMENT (OUTPATIENT)
Dept: LAB | Facility: HOSPITAL | Age: 82
End: 2022-02-24
Payer: MEDICARE

## 2022-02-24 DIAGNOSIS — L29.9 GENERALIZED PRURITUS: ICD-10-CM

## 2022-02-24 PROCEDURE — 71046 X-RAY EXAM CHEST 2 VIEWS: CPT

## 2022-02-24 PROCEDURE — 84165 PROTEIN E-PHORESIS SERUM: CPT | Performed by: PATHOLOGY

## 2022-02-24 PROCEDURE — 84165 PROTEIN E-PHORESIS SERUM: CPT

## 2022-02-24 PROCEDURE — 36415 COLL VENOUS BLD VENIPUNCTURE: CPT

## 2022-02-26 LAB
ALBUMIN SERPL ELPH-MCNC: 4.36 G/DL (ref 3.5–5)
ALBUMIN SERPL ELPH-MCNC: 61.4 % (ref 52–65)
ALPHA1 GLOB SERPL ELPH-MCNC: 0.27 G/DL (ref 0.1–0.4)
ALPHA1 GLOB SERPL ELPH-MCNC: 3.8 % (ref 2.5–5)
ALPHA2 GLOB SERPL ELPH-MCNC: 0.67 G/DL (ref 0.4–1.2)
ALPHA2 GLOB SERPL ELPH-MCNC: 9.4 % (ref 7–13)
BETA GLOB ABNORMAL SERPL ELPH-MCNC: 0.45 G/DL (ref 0.4–0.8)
BETA1 GLOB SERPL ELPH-MCNC: 6.3 % (ref 5–13)
BETA2 GLOB SERPL ELPH-MCNC: 5.7 % (ref 2–8)
BETA2+GAMMA GLOB SERPL ELPH-MCNC: 0.4 G/DL (ref 0.2–0.5)
GAMMA GLOB ABNORMAL SERPL ELPH-MCNC: 0.95 G/DL (ref 0.5–1.6)
GAMMA GLOB SERPL ELPH-MCNC: 13.4 % (ref 12–22)
IGG/ALB SER: 1.59 {RATIO} (ref 1.1–1.8)
PROT PATTERN SERPL ELPH-IMP: NORMAL
PROT SERPL-MCNC: 7.1 G/DL (ref 6.4–8.2)

## 2022-03-01 ENCOUNTER — APPOINTMENT (OUTPATIENT)
Dept: LAB | Facility: CLINIC | Age: 82
End: 2022-03-01
Payer: MEDICARE

## 2022-03-01 ENCOUNTER — TRANSCRIBE ORDERS (OUTPATIENT)
Dept: LAB | Facility: CLINIC | Age: 82
End: 2022-03-01

## 2022-03-01 DIAGNOSIS — L40.0 PSORIASIS VULGARIS: Primary | ICD-10-CM

## 2022-03-01 DIAGNOSIS — L40.0 PSORIASIS VULGARIS: ICD-10-CM

## 2022-03-01 DIAGNOSIS — Z79.899 ENCOUNTER FOR LONG-TERM (CURRENT) USE OF OTHER MEDICATIONS: ICD-10-CM

## 2022-03-01 LAB
CHOLEST SERPL-MCNC: 218 MG/DL
CRP SERPL QL: <3 MG/L
ERYTHROCYTE [SEDIMENTATION RATE] IN BLOOD: 14 MM/HOUR (ref 0–29)
HDLC SERPL-MCNC: 85 MG/DL
LDLC SERPL CALC-MCNC: 120 MG/DL (ref 0–100)
NONHDLC SERPL-MCNC: 133 MG/DL
TRIGL SERPL-MCNC: 65 MG/DL

## 2022-03-01 PROCEDURE — 85652 RBC SED RATE AUTOMATED: CPT

## 2022-03-01 PROCEDURE — 80061 LIPID PANEL: CPT

## 2022-03-01 PROCEDURE — 86140 C-REACTIVE PROTEIN: CPT

## 2022-03-01 PROCEDURE — 36415 COLL VENOUS BLD VENIPUNCTURE: CPT

## 2022-04-07 RX ORDER — PERMETHRIN 50 MG/G
CREAM TOPICAL
COMMUNITY
Start: 2022-02-23

## 2022-04-07 RX ORDER — IVERMECTIN 3 MG/1
TABLET ORAL
COMMUNITY
Start: 2022-02-23

## 2022-04-08 ENCOUNTER — OFFICE VISIT (OUTPATIENT)
Dept: UROLOGY | Facility: AMBULATORY SURGERY CENTER | Age: 82
End: 2022-04-08
Payer: MEDICARE

## 2022-04-08 VITALS
SYSTOLIC BLOOD PRESSURE: 130 MMHG | HEIGHT: 68 IN | WEIGHT: 164.4 LBS | DIASTOLIC BLOOD PRESSURE: 80 MMHG | HEART RATE: 80 BPM | BODY MASS INDEX: 24.92 KG/M2

## 2022-04-08 DIAGNOSIS — N32.81 OAB (OVERACTIVE BLADDER): ICD-10-CM

## 2022-04-08 DIAGNOSIS — R31.29 MICROSCOPIC HEMATURIA: ICD-10-CM

## 2022-04-08 DIAGNOSIS — R31.0 GROSS HEMATURIA: Primary | ICD-10-CM

## 2022-04-08 LAB
BACTERIA UR QL AUTO: ABNORMAL /HPF
BILIRUB UR QL STRIP: NEGATIVE
CLARITY UR: CLEAR
COLOR UR: ABNORMAL
GLUCOSE UR STRIP-MCNC: NEGATIVE MG/DL
HGB UR QL STRIP.AUTO: ABNORMAL
KETONES UR STRIP-MCNC: NEGATIVE MG/DL
LEUKOCYTE ESTERASE UR QL STRIP: NEGATIVE
NITRITE UR QL STRIP: NEGATIVE
NON-SQ EPI CELLS URNS QL MICRO: ABNORMAL /HPF
PH UR STRIP.AUTO: 5.5 [PH]
PROT UR STRIP-MCNC: NEGATIVE MG/DL
RBC #/AREA URNS AUTO: ABNORMAL /HPF
SL AMB  POCT GLUCOSE, UA: NORMAL
SL AMB LEUKOCYTE ESTERASE,UA: NORMAL
SL AMB POCT BILIRUBIN,UA: NORMAL
SL AMB POCT BLOOD,UA: NORMAL
SL AMB POCT CLARITY,UA: CLEAR
SL AMB POCT COLOR,UA: YELLOW
SL AMB POCT KETONES,UA: NORMAL
SL AMB POCT NITRITE,UA: NORMAL
SL AMB POCT PH,UA: 5
SL AMB POCT SPECIFIC GRAVITY,UA: 1.01
SL AMB POCT URINE PROTEIN: NORMAL
SL AMB POCT UROBILINOGEN: NORMAL
SP GR UR STRIP.AUTO: 1.01 (ref 1–1.03)
UROBILINOGEN UR STRIP-ACNC: <2 MG/DL
WBC #/AREA URNS AUTO: ABNORMAL /HPF

## 2022-04-08 PROCEDURE — 99204 OFFICE O/P NEW MOD 45 MIN: CPT | Performed by: NURSE PRACTITIONER

## 2022-04-08 PROCEDURE — 88112 CYTOPATH CELL ENHANCE TECH: CPT | Performed by: PATHOLOGY

## 2022-04-08 PROCEDURE — 81002 URINALYSIS NONAUTO W/O SCOPE: CPT | Performed by: NURSE PRACTITIONER

## 2022-04-08 PROCEDURE — 81001 URINALYSIS AUTO W/SCOPE: CPT | Performed by: NURSE PRACTITIONER

## 2022-04-08 NOTE — PATIENT INSTRUCTIONS
Cystoscopy   AMBULATORY CARE:   A cystoscopy  is a procedure to look inside of your urethra and bladder using a cystoscope  A cystoscope is a small tube with a light and magnifying camera on the end  The procedure is used to diagnose and treat conditions of the bladder, urethra, and prostate  The procedure is also done to remove stones or blood clots from the urethra or bladder  Your healthcare provider may do other tests, such as ureteroscopy, during a cystoscopy  Prepare for your cystoscopy: You may need to stop smoking several days before your procedure, if you are having general anesthesia  Tell your healthcare provider what medicines you take  Your healthcare provider will tell you what medicines to take and not to take on the day of your procedure  You may need to stop taking medicines such as anticoagulants, aspirin, and ibuprofen several days before your procedure  He may tell you stop eating after midnight the night before your procedure  You may be asked to drink a large amount of liquids before your procedure  Make plans for someone to drive you home after your procedure  During your cystoscopy:   · You may be given general anesthesia to keep you asleep and pain free during your procedure  Your healthcare provider may give you anesthesia in your spine  With spinal anesthesia the lower part of your body will be numb  You will not feel pain during your procedure  Your healthcare provider may instead use local anesthesia that is put into your urethra and bladder  You will not feel pain, but you may be able to feel some pressure during your procedure  With local anesthesia, you may feel burning or need to urinate when the cystoscope is put in and removed  · You will be placed on your back and your feet may be placed in stirrups  The cystoscope will be will be placed through your urethra and into your bladder   The urologist will look at the walls of your urethra as the scope goes through to your bladder  Your bladder may be filled with an irrigation liquid to help your urologist see inside of your bladder more clearly   Special tools may used to remove tissue or stones  Your urologist may use a special tool to stop bleeding in your bladder  If there are blood clots in your bladder, your healthcare provider will inject an irrigation fluid into your bladder  Then he will use suction to remove the fluid and blood clots  After a cystoscopy:  After you are fully awake, you will go home  After your cystoscopy, it is normal to have pink-colored urine  It is also normal to have an increased need to urinate  You may have burning when you urinate  If you had general anesthesia, it may take at least 24 hours before you feel like your usual self  Risks of a cystoscopy: You may bleed more than expected or develop an infection  Swelling caused by the cystoscopy may cause a blockage or slow urine flow  Seek care immediately if:   · Your urine turns from pink to red, or you have clots in your urine  · You cannot urinate and your bladder feels full  · Your pain or burning becomes worse or lasts longer than 2 days  Contact your healthcare provider or urologist if:   · Your urine stays pink for longer than 3 days  · Your pain or burning becomes worse  · Your skin is itchy, swollen, or has a new rash  · You have a fever and chills  · You have questions or concerns about your condition or care  After your cystoscopy: It is normal to have pink-colored urine  It is also normal to have an increased need to urinate and burning when you urinate  If you had general anesthesia, it may take at least 24 hours before you feel like your usual self  · Drink at least 3 to 4 glasses of water daily for 2 days after your procedure  Avoid acidic juices such as orange juice and lemonade  Water can help prevent blood clots from forming   It can also help decrease the amount of acid in your urine that can cause burning  · Sit in a warm tub of water  Warm baths relieve pain and bladder spasms  · Do not have sex  until your healthcare provider tells you it is okay  Sex may increase your risk for a urinary tract infection  Medicines: You may  be given any of the following:  · Antibiotics  help treat or prevent a bacterial infection  · Acetaminophen  decreases pain and fever  It is available without a doctor's order  Ask how much to take and how often to take it  Follow directions  Read the labels of all other medicines you are using to see if they also contain acetaminophen, or ask your doctor or pharmacist  Acetaminophen can cause liver damage if not taken correctly  Do not use more than 4 grams (4,000 milligrams) total of acetaminophen in one day  · Take your medicine as directed  Contact your healthcare provider if you think your medicine is not helping or if you have side effects  Tell him or her if you are allergic to any medicine  Keep a list of the medicines, vitamins, and herbs you take  Include the amounts, and when and why you take them  Bring the list or the pill bottles to follow-up visits  Carry your medicine list with you in case of an emergency  Follow up with your healthcare provider as directed: You may need to have another cystoscopy  Write down your questions so you remember to ask them during your visits  © 2017 [employer]  Information is for End User's use only and may not be sold, redistributed or otherwise used for commercial purposes  All illustrations and images included in CareNotes® are the copyrighted property of A D A M , Inc  or Jamel Lozano  The above information is an  only  It is not intended as medical advice for individual conditions or treatments  Talk to your doctor, nurse or pharmacist before following any medical regimen to see if it is safe and effective for you

## 2022-04-08 NOTE — PROGRESS NOTES
4/8/2022    Jerson [place]  1940  [id number]        Assessment  -Microscopic hematuria s/p negative urethral and bladder biopsy (2009)  -Overactive bladder    Discussion/Plan  Erica Villarreal is a 79 y o  female who presents in consultation     1  Microscopic hematuria s/p negative urethral and bladder biopsy (2009)- urine dip in office today notes presence of blood  Will send for urinalysis with microscopic and cytology  If patient is still found to have presence of >3 RBC on hpf, we discussed proceeding with flexible cystoscopy  Renal ultrasound ordered to further evaluate worsening lower urinary tract symptoms  2  Overactive bladder- reviewed dietary and behavioral modifications  Will await results of renal ultrasound  She is not interested in anticholinergics or myrbetriq    Call with results of urine testing  She will proceed with renal ultrasound and cystoscopy if microhematuria persists  Patient was advised to call sooner with any issues        -All questions answered, patients agree with plan     History of Present Illness  79 y o  female who presents in follow up today for evaluation of microhematuria and overactive bladder  She was last seen in the office in February 2019  Patient previously known to Dr Barnard Height  She has remote history of microhematuria and underwent urethral and bladder biopsy in 2009 which revealed rare atypical urothelial cells  Patient has had no episodes of gross hematuria, but has since been experiencing ongoing urinary urgency and frequency  She denies any strong family history of bladder or kidney malignancy  Patient is a lifetime nonsmoker  Review of Systems  Review of Systems   Constitutional: Negative  HENT: Negative  Respiratory: Negative  Cardiovascular: Negative  Gastrointestinal: Negative  Genitourinary: Positive for frequency and urgency  Negative for decreased urine volume, difficulty urinating, dysuria, flank pain and hematuria  Musculoskeletal: Negative  Skin: Negative  Neurological: Negative  Psychiatric/Behavioral: Negative          Past Medical History  Past Medical History:   Diagnosis Date    Bladder polyps     Breast cancer ([address] )     Left    Cholelithiasis     GERD (gastroesophageal reflux disease)     Hematuria        Past Social History  Past Surgical History:   Procedure Laterality Date    APPENDECTOMY      BREAST SURGERY Left     Mastectomy    CHOLECYSTECTOMY      CYSTOSCOPY         Past Family History  Family History   Problem Relation Age of Onset    Lymphoma Mother     Heart disease Father     Pancreatic cancer Sister        Past Social history  Social History     Socioeconomic History    Marital status: /Civil Union     Spouse name: Not on file    Number of children: Not on file    Years of education: Not on file    Highest education level: Not on file   Occupational History    Not on file   Tobacco Use    Smoking status: Never Smoker    Smokeless tobacco: Never Used    Tobacco comment: no exposure to passive smoke   Vaping Use    Vaping Use: Never used   Substance and Sexual Activity    Alcohol use: Yes     Comment: SOCIAL DRINKER    Drug use: No    Sexual activity: Not on file   Other Topics Concern    Not on file   Social History Narrative    Not on file     Social Determinants of Health     Financial Resource Strain: Not on file   Food Insecurity: Not on file   Transportation Needs: Not on file   Physical Activity: Not on file   Stress: Not on file   Social Connections: Not on file   Intimate Partner Violence: Not on file   Housing Stability: Not on file       Current Medications  Current Outpatient Medications   Medication Sig Dispense Refill    acetaminophen (TYLENOL) 100 mg/mL solution Take 10 mg/kg by mouth every 4 (four) hours as needed for fever      Ascorbic Acid (VITAMIN C) 1000 MG tablet Take by mouth daily       B-Complex-C CAPS Take by mouth daily       famotidine (PEPCID) 20 mg tablet       magnesium 30 MG tablet Take 30 mg by mouth 2 (two) times a day      ivermectin (STROMECTOL) 3 MG TABS TAKE FIVE TABLETS BY MOUTH ONCE   CAN REPEAT TREATMENT IN 2 WEEKS IF NEEDED (Patient not taking: Reported on 4/8/2022 )      permethrin (ELIMITE) 5 % cream APPLY FROM THE NECK DOWN , LEAVE ON 8 TO 14 HOURS   THEN RINSE OFF   CAN REPEAT IN 1 WEEK IF NEEDED (Patient not taking: Reported on 4/8/2022 )       No current facility-administered medications for this visit  Allergies  Allergies   Allergen Reactions    No Active Allergies        Past medical history, social history, family history, medications and allergies were reviewed  Vitals  Vitals:    04/08/22 1024   BP: 130/80   Pulse: 80   Weight: 74 6 kg (164 lb 6 4 oz)   Height: 5' 7 5" (1 715 m)       Physical Exam  Physical Exam  Constitutional:       Appearance: Normal appearance  She is well-developed  HENT:      Head: Normocephalic  Eyes:      Pupils: Pupils are equal, round, and reactive to light  Pulmonary:      Effort: Pulmonary effort is normal    Abdominal:      Palpations: Abdomen is soft  Tenderness: There is no right CVA tenderness or left CVA tenderness  Musculoskeletal:         General: Normal range of motion  Cervical back: Normal range of motion  Skin:     General: Skin is warm and dry  Neurological:      General: No focal deficit present  Mental Status: She is alert and oriented to person, place, and time  Psychiatric:         Mood and Affect: Mood normal          Behavior: Behavior normal          Thought Content:  Thought content normal          Judgment: Judgment normal          Results    I have personally reviewed all pertinent lab results and reviewed with patient  Lab Results   Component Value Date    GLUCOSE 84 10/24/2014    CALCIUM 9 3 01/06/2022     10/24/2014    K 4 3 01/06/2022    CO2 28 01/06/2022     01/06/2022    BUN 18 01/06/2022    CREATININE 0 82 01/06/2022     Lab Results   Component Value Date    WBC 6 68 01/06/2022    HGB 11 4 (L) 01/06/2022    HCT 36 0 01/06/2022    MCV 94 01/06/2022     01/06/2022     Recent Results (from the past 1 hour(s))   POCT urine dip    Collection Time: 04/08/22 10:24 AM   Result Value Ref Range    LEUKOCYTE ESTERASE,UA -     NITRITE,UA -     SL AMB POCT UROBILINOGEN -     POCT URINE PROTEIN -      PH,UA 5 0     BLOOD,UA moderate     SPECIFIC GRAVITY,UA 1 015     KETONES,UA -     BILIRUBIN,UA -     GLUCOSE, UA -      COLOR,UA yellow     CLARITY,UA clear

## 2022-04-11 ENCOUNTER — TELEPHONE (OUTPATIENT)
Dept: UROLOGY | Facility: CLINIC | Age: 82
End: 2022-04-11

## 2022-04-11 NOTE — TELEPHONE ENCOUNTER
----- Message from [id number] Bessy Alvarez sent at 4/10/2022  7:55 PM EDT -----  Please inform patient results of repeat UA noted 2-4 RBC on hpf  She will proceed with flexible cystoscopy as scheduled for evaluation of recurrent microhematuria

## 2022-04-11 NOTE — TELEPHONE ENCOUNTER
Called and LM for patient per communication consent  Advised that UA is still showing microscopic blood and that she is to proceed with the cysto as scheduled

## 2022-04-12 ENCOUNTER — OFFICE VISIT (OUTPATIENT)
Dept: FAMILY MEDICINE CLINIC | Facility: CLINIC | Age: 82
End: 2022-04-12
Payer: MEDICARE

## 2022-04-12 VITALS
DIASTOLIC BLOOD PRESSURE: 66 MMHG | HEIGHT: 67 IN | OXYGEN SATURATION: 100 % | TEMPERATURE: 97.5 F | SYSTOLIC BLOOD PRESSURE: 124 MMHG | WEIGHT: 166.4 LBS | BODY MASS INDEX: 26.12 KG/M2 | HEART RATE: 85 BPM | RESPIRATION RATE: 18 BRPM

## 2022-04-12 DIAGNOSIS — Z12.31 ENCOUNTER FOR SCREENING MAMMOGRAM FOR MALIGNANT NEOPLASM OF BREAST: ICD-10-CM

## 2022-04-12 DIAGNOSIS — Z00.00 MEDICARE ANNUAL WELLNESS VISIT, SUBSEQUENT: Primary | ICD-10-CM

## 2022-04-12 DIAGNOSIS — C50.919 MALIGNANT NEOPLASM OF FEMALE BREAST, UNSPECIFIED ESTROGEN RECEPTOR STATUS, UNSPECIFIED LATERALITY, UNSPECIFIED SITE OF BREAST (HCC): ICD-10-CM

## 2022-04-12 DIAGNOSIS — R31.29 MICROSCOPIC HEMATURIA: ICD-10-CM

## 2022-04-12 DIAGNOSIS — K21.9 GASTROESOPHAGEAL REFLUX DISEASE WITHOUT ESOPHAGITIS: Chronic | ICD-10-CM

## 2022-04-12 DIAGNOSIS — K57.92 DIVERTICULITIS: ICD-10-CM

## 2022-04-12 PROCEDURE — G0439 PPPS, SUBSEQ VISIT: HCPCS | Performed by: FAMILY MEDICINE

## 2022-04-12 PROCEDURE — 1123F ACP DISCUSS/DSCN MKR DOCD: CPT | Performed by: FAMILY MEDICINE

## 2022-04-12 PROCEDURE — 99214 OFFICE O/P EST MOD 30 MIN: CPT | Performed by: FAMILY MEDICINE

## 2022-04-12 NOTE — PATIENT INSTRUCTIONS
Medicare Preventive Visit Patient Instructions  Thank you for completing your Welcome to Medicare Visit or Medicare Annual Wellness Visit today  Your next wellness visit will be due in one year (4/13/2023)  The screening/preventive services that you may require over the next 5-10 years are detailed below  Some tests may not apply to you based off risk factors and/or age  Screening tests ordered at today's visit but not completed yet may show as past due  Also, please note that scanned in results may not display below  Preventive Screenings:  Service Recommendations Previous Testing/Comments   Colorectal Cancer Screening  * Colonoscopy    * Fecal Occult Blood Test (FOBT)/Fecal Immunochemical Test (FIT)  * Fecal DNA/Cologuard Test  * Flexible Sigmoidoscopy Age: 54-65 years old   Colonoscopy: every 10 years (may be performed more frequently if at higher risk)  OR  FOBT/FIT: every 1 year  OR  Cologuard: every 3 years  OR  Sigmoidoscopy: every 5 years  Screening may be recommended earlier than age 48 if at higher risk for colorectal cancer  Also, an individualized decision between you and your healthcare provider will decide whether screening between the ages of 74-80 would be appropriate  Colonoscopy: 05/24/2017  FOBT/FIT: Not on file  Cologuard: Not on file  Sigmoidoscopy: Not on file    Screening Current     Breast Cancer Screening Age: 36 years old  Frequency: every 1-2 years  Not required if history of left and right mastectomy Mammogram: 10/16/2018    History Breast Cancer   Cervical Cancer Screening Between the ages of 21-29, pap smear recommended once every 3 years  Between the ages of 33-67, can perform pap smear with HPV co-testing every 5 years     Recommendations may differ for women with a history of total hysterectomy, cervical cancer, or abnormal pap smears in past  Pap Smear: Not on file    Screening Not Indicated   Hepatitis C Screening Once for adults born between 1945 and 1965  More frequently in patients at high risk for Hepatitis C Hep C Antibody: Not on file        Diabetes Screening 1-2 times per year if you're at risk for diabetes or have pre-diabetes Fasting glucose: 84 mg/dL   A1C: No results in last 5 years    Screening Current   Cholesterol Screening Once every 5 years if you don't have a lipid disorder  May order more often based on risk factors  Lipid panel: 03/01/2022    Screening Current     Other Preventive Screenings Covered by Medicare:  1  Abdominal Aortic Aneurysm (AAA) Screening: covered once if your at risk  You're considered to be at risk if you have a family history of AAA  2  Lung Cancer Screening: covers low dose CT scan once per year if you meet all of the following conditions: (1) Age 50-69; (2) No signs or symptoms of lung cancer; (3) Current smoker or have quit smoking within the last 15 years; (4) You have a tobacco smoking history of at least 30 pack years (packs per day multiplied by number of years you smoked); (5) You get a written order from a healthcare provider  3  Glaucoma Screening: covered annually if you're considered high risk: (1) You have diabetes OR (2) Family history of glaucoma OR (3)  aged 48 and older OR (3)  American aged 72 and older  3  Osteoporosis Screening: covered every 2 years if you meet one of the following conditions: (1) You're estrogen deficient and at risk for osteoporosis based off medical history and other findings; (2) Have a vertebral abnormality; (3) On glucocorticoid therapy for more than 3 months; (4) Have primary hyperparathyroidism; (5) On osteoporosis medications and need to assess response to drug therapy  · Last bone density test (DXA Scan): 03/03/2020   5  HIV Screening: covered annually if you're between the age of 15-65  Also covered annually if you are younger than 13 and older than 72 with risk factors for HIV infection   For pregnant patients, it is covered up to 3 times per pregnancy  Immunizations:  Immunization Recommendations   Influenza Vaccine Annual influenza vaccination during flu season is recommended for all persons aged >= 6 months who do not have contraindications   Pneumococcal Vaccine (Prevnar and Pneumovax)  * Prevnar = PCV13  * Pneumovax = PPSV23   Adults 25-60 years old: 1-3 doses may be recommended based on certain risk factors  Adults 72 years old: Prevnar (PCV13) vaccine recommended followed by Pneumovax (PPSV23) vaccine  If already received PPSV23 since turning 65, then PCV13 recommended at least one year after PPSV23 dose  Hepatitis B Vaccine 3 dose series if at intermediate or high risk (ex: diabetes, end stage renal disease, liver disease)   Tetanus (Td) Vaccine - COST NOT COVERED BY MEDICARE PART B Following completion of primary series, a booster dose should be given every 10 years to maintain immunity against tetanus  Td may also be given as tetanus wound prophylaxis  Tdap Vaccine - COST NOT COVERED BY MEDICARE PART B Recommended at least once for all adults  For pregnant patients, recommended with each pregnancy  Shingles Vaccine (Shingrix) - COST NOT COVERED BY MEDICARE PART B  2 shot series recommended in those aged 48 and above     Health Maintenance Due:      Topic Date Due    Colorectal Cancer Screening  05/24/2022     Immunizations Due:      Topic Date Due    DTaP,Tdap,and Td Vaccines (1 - Tdap) Never done    Influenza Vaccine (1) 09/01/2021     Advance Directives   What are advance directives? Advance directives are legal documents that state your wishes and plans for medical care  These plans are made ahead of time in case you lose your ability to make decisions for yourself  Advance directives can apply to any medical decision, such as the treatments you want, and if you want to donate organs  What are the types of advance directives? There are many types of advance directives, and each state has rules about how to use them   You may choose a combination of any of the following:  · Living will: This is a written record of the treatment you want  You can also choose which treatments you do not want, which to limit, and which to stop at a certain time  This includes surgery, medicine, IV fluid, and tube feedings  · Durable power of  for healthcare [facility]): This is a written record that states who you want to make healthcare choices for you when you are unable to make them for yourself  This person, called a proxy, is usually a family member or a friend  You may choose more than 1 proxy  · Do not resuscitate (DNR) order:  A DNR order is used in case your heart stops beating or you stop breathing  It is a request not to have certain forms of treatment, such as CPR  A DNR order may be included in other types of advance directives  · Medical directive: This covers the care that you want if you are in a coma, near death, or unable to make decisions for yourself  You can list the treatments you want for each condition  Treatment may include pain medicine, surgery, blood transfusions, dialysis, IV or tube feedings, and a ventilator (breathing machine)  · Values history: This document has questions about your views, beliefs, and how you feel and think about life  This information can help others choose the care that you would choose  Why are advance directives important? An advance directive helps you control your care  Although spoken wishes may be used, it is better to have your wishes written down  Spoken wishes can be misunderstood, or not followed  Treatments may be given even if you do not want them  An advance directive may make it easier for your family to make difficult choices about your care  Weight Management   Why it is important to manage your weight:  Being overweight increases your risk of health conditions such as heart disease, high blood pressure, type 2 diabetes, and certain types of cancer   It can also increase your risk for osteoarthritis, sleep apnea, and other respiratory problems  Aim for a slow, steady weight loss  Even a small amount of weight loss can lower your risk of health problems  How to lose weight safely:  A safe and healthy way to lose weight is to eat fewer calories and get regular exercise  You can lose up about 1 pound a week by decreasing the number of calories you eat by 500 calories each day  Healthy meal plan for weight management:  A healthy meal plan includes a variety of foods, contains fewer calories, and helps you stay healthy  A healthy meal plan includes the following:  · Eat whole-grain foods more often  A healthy meal plan should contain fiber  Fiber is the part of grains, fruits, and vegetables that is not broken down by your body  Whole-grain foods are healthy and provide extra fiber in your diet  Some examples of whole-grain foods are whole-wheat breads and pastas, oatmeal, brown rice, and bulgur  · Eat a variety of vegetables every day  Include dark, leafy greens such as spinach, kale, buster greens, and mustard greens  Eat yellow and orange vegetables such as carrots, sweet potatoes, and winter squash  · Eat a variety of fruits every day  Choose fresh or canned fruit (canned in its own juice or light syrup) instead of juice  Fruit juice has very little or no fiber  · Eat low-fat dairy foods  Drink fat-free (skim) milk or 1% milk  Eat fat-free yogurt and low-fat cottage cheese  Try low-fat cheeses such as mozzarella and other reduced-fat cheeses  · Choose meat and other protein foods that are low in fat  Choose beans or other legumes such as split peas or lentils  Choose fish, skinless poultry (chicken or turkey), or lean cuts of red meat (beef or pork)  Before you cook meat or poultry, cut off any visible fat  · Use less fat and oil  Try baking foods instead of frying them  Add less fat, such as margarine, sour cream, regular salad dressing and mayonnaise to foods   Eat fewer high-fat foods  Some examples of high-fat foods include french fries, doughnuts, ice cream, and cakes  · Eat fewer sweets  Limit foods and drinks that are high in sugar  This includes candy, cookies, regular soda, and sweetened drinks  Exercise:  Exercise at least 30 minutes per day on most days of the week  Some examples of exercise include walking, biking, dancing, and swimming  You can also fit in more physical activity by taking the stairs instead of the elevator or parking farther away from stores  Ask your healthcare provider about the best exercise plan for you  © Copyright [employer] 2018 Information is for End User's use only and may not be sold, redistributed or otherwise used for commercial purposes   All illustrations and images included in CareNotes® are the copyrighted property of A D A M , Inc  or [address] [place] [address]

## 2022-04-12 NOTE — PROGRESS NOTES
Assessment and Plan:     Problem List Items Addressed This Visit        Digestive    Gastroesophageal reflux disease without esophagitis (Chronic)     · Patient takes pepcid 20mg qhs which helps  · She is concerned because she has a family history stomach cancer  · She had an endoscopy several years ago but records are not available  · Will continue to monitor for change to symptoms              Genitourinary    Microscopic hematuria     · Follows with urology  · Has cystoscopy scheduled for 5/2022 with Dr Randy Scott            Other    Diverticulitis     stable  Has not had flare up in >5 years ago         Malignant neoplasm of female breast ([facility] [address] )     · Patient stopped getting mammograms  · She is interested in repeat mammogram today           Relevant Orders    Mammo screening bilateral w cad      Other Visit Diagnoses     Medicare annual wellness visit, subsequent    -  Primary    BMI 26 0-26 9,adult        Encounter for screening mammogram for malignant neoplasm of breast         Relevant Orders    Mammo screening bilateral w cad        BMI Counseling: Body mass index is 26 06 kg/m²  The BMI is above normal  Nutrition recommendations include decreasing portion sizes, encouraging healthy choices of fruits and vegetables, decreasing fast food intake, limiting drinks that contain sugar, moderation in carbohydrate intake, reducing intake of saturated and trans fat and reducing intake of cholesterol  Exercise recommendations include moderate physical activity 150 minutes/week and exercising 3-5 times per week  Rationale for BMI follow-up plan is due to patient being overweight or obese  Depression Screening and Follow-up Plan: Patient was screened for depression during today's encounter  They screened negative with a PHQ-2 score of 0  Preventive health issues were discussed with patient, and age appropriate screening tests were ordered as noted in patient's After Visit Summary    Personalized health advice and appropriate referrals for health education or preventive services given if needed, as noted in patient's After Visit Summary       History of Present Illness:     Patient presents for Medicare Annual Wellness visit    Patient Care Team:  Mike Jaramillo MD as PCP - General  Stephany Hoffman PA-C     Problem List:     Patient Active Problem List   Diagnosis    Microscopic hematuria    Increased frequency of urination    Diverticulitis    Screening cholesterol level    Malignant ne[facility] female [facility] ([facility] )    Cervical stenosis of spinal canal    Chest pain    Gastroesophageal reflux disease without esophagitis      Past Medical and Surgical History:     Past Medical History:   Diagnosis Date    Bladder polyps     Breast cancer ([facility] )     Left    Cholelithiasis     GERD (gastroesophageal reflux disease)     Hematuria      Past Surgical History:   Procedure Laterality Date    APPENDECTOMY      BREAST SURGERY Left     Mastectomy    CHOLECYSTECTOMY      CYSTOSCOPY        Family History:     Family History   Problem Relation Age of Onset    Lymphoma Mother     Heart disease Father     Pancreatic cancer Sister       Social History:     Social History     Socioeconomic History    Marital status: /Civil Union     Spouse name: None    Number of children: None    Years of education: None    Highest education level: None   Occupational History    None   Tobacco Use    Smoking status: Never Smoker    Smokeless tobacco: Never Used    Tobacco comment: no exposure to passive smoke   Vaping Use    Vaping Use: Never used   Substance and Sexual Activity    Alcohol use: Yes     Comment: SOCIAL DRINKER    Drug use: No    Sexual activity: None   Other Topics Concern    None   Social History Narrative    None     Social Determinants of Health     Financial Resource Strain: Not on file   Food Insecurity: Not on file   Transportation Needs: Not on file   Physical Activity: Not on file   Stress: Not on file   Social Connections: Not on file   Intimate Partner Violence: Not on file   Housing Stability: Not on file      Medications and Allergies:     Current Outpatient Medications   Medication Sig Dispense Refill    acetaminophen (TYLENOL) 100 mg/mL solution Take 10 mg/kg by mouth every 4 (four) hours as needed for fever      Ascorbic Acid (VITAMIN C) 1000 MG tablet Take by mouth daily       B-Complex-C CAPS Take by mouth daily       famotidine (PEPCID) 20 mg tablet Take 20 mg by mouth daily        magnesium 30 MG tablet Take 30 mg by mouth 2 (two) times a day      ivermectin (STROMECTOL) 3 MG TABS TAKE FIVE TABLETS BY MOUTH ONCE   CAN REPEAT TREATMENT IN 2 WEEKS IF NEEDED (Patient not taking: Reported on 4/8/2022 )      permethrin (ELIMITE) 5 % cream APPLY FROM THE NECK DOWN , LEAVE ON 8 TO 14 HOURS   THEN RINSE OFF   CAN REPEAT IN 1 WEEK IF NEEDED (Patient not taking: Reported on 4/8/2022 )       No current facility-administered medications for this visit       Allergies   Allergen Reactions    No Active Allergies       Immunizations:     Immunization History   Administered Date(s) Administered    COVID-19 PFIZER VACCINE 0 3 ML IM 01/18/2021, 02/09/2021, 01/15/2022    INFLUENZA 11/16/2005, 11/04/2016    Influenza Split High Dose Preservative Free IM 11/04/2016    Influenza, high dose seasonal 0 7 mL 11/16/2020    Pneumococcal Conjugate 13-Valent 08/23/2018    Pneumococcal Polysaccharide PPV23 12/20/2004    Zoster 03/10/2011, 07/01/2013      Health Maintenance:         Topic Date Due    Colorectal Cancer Screening  05/24/2022         Topic Date Due    DTaP,Tdap,and Td Vaccines (1 - Tdap) Never done    Influenza Vaccine (1) 09/01/2021      Medicare Health Risk Assessment:     /66 (BP Location: Left arm, Patient Position: Sitting, Cuff Size: Standard)   Pulse 85   Temp 97 5 °F (36 4 °C) (Tympanic)   Resp 18   Ht 5' 7" (1 702 m)   Wt 75 5 kg (166 lb 6 4 oz)   SpO2 100%   BMI 26 06 kg/m²      [place] is here for her Subsequent Wellness visit  Last Medicare Wellness visit information reviewed, patient interviewed and updates made to the record today  Health Risk Assessment:   Patient rates overall health as very good  Patient feels that their physical health rating is same  Patient is satisfied with their life  Eyesight was rated as same  Hearing was rated as same  Patient feels that their emotional and mental health rating is same  Patients states they are sometimes angry  Patient states they are never, rarely unusually tired/fatigued  Pain experienced in the last 7 days has been none  Patient states that she has experienced no weight loss or gain in last 6 months  Depression Screening:   PHQ-2 Score: 0      Fall Risk Screening: In the past year, patient has experienced: no history of falling in past year      Urinary Incontinence Screening:   Patient has not leaked urine accidently in the last six months  Home Safety:  Patient does not have trouble with stairs inside or outside of their home  Patient has working smoke alarms and has working carbon monoxide detector  Home safety hazards include: none  Nutrition:   Current diet is Regular  Medications:   Patient is currently taking over-the-counter supplements  OTC medications include: see medication list  Patient is able to manage medications  Activities of Daily Living (ADLs)/Instrumental Activities of Daily Living (IADLs):   Walk and transfer into and out of bed and chair?: Yes  Dress and groom yourself?: Yes    Bathe or shower yourself?: Yes    Feed yourself? Yes  Do your laundry/housekeeping?: Yes  Manage your money, pay your bills and track your expenses?: Yes  Make your own meals?: Yes    Do your own shopping?: Yes    Previous Hospitalizations:   Any hospitalizations or ED visits within the last 12 months?: No      Advance Care Planning:   Living will: Yes    Durable POA for healthcare:  Yes    Advanced directive: Yes      Comments: Patient will provide copy of Living Will    Cognitive Screening:   Provider or family/friend/caregiver concerned regarding cognition?: No    PREVENTIVE SCREENINGS      Cardiovascular Screening:    General: Screening Current      Diabetes Screening:     General: Screening Current      Colorectal Cancer Screening:     General: Screening Current      Breast Cancer Screening:     General: History Breast Cancer    Due for: Mammogram        Cervical Cancer Screening:    General: Screening Not Indicated      Osteoporosis Screening:    General: Screening Current      Abdominal Aortic Aneurysm (AAA) Screening:        General: Screening Not Indicated      Lung Cancer Screening:     General: Screening Not Indicated      Hepatitis C Screening:    General: Screening Current    Screening, Brief Intervention, and Referral to Treatment (SBIRT)    Screening  Typical number of drinks in a day: 0  Typical number of drinks in a week: 1  Interpretation: Low risk drinking behavior  AUDIT-C Screenin) How often did you have a drink containing alcohol in the past year? monthly or less  3) How often did you have 6 or more drinks on one occasion in the past year? never    Single Item Drug Screening:  How often have you used an illegal drug (including marijuana) or a prescription medication for non-medical reasons in the past year? never    Single Item Drug Screen Score: 0  Interpretation: Negative screen for possible drug use disorder    Other Counseling Topics:   Car/seat belt/driving safety, skin self-exam, sunscreen and calcium and vitamin D intake and regular weightbearing exercise  Mani Gibbons, DO BMI Counseling: Body mass index is 26 06 kg/m²   The BMI is above normal  Nutrition recommendations include reducing portion sizes, decreasing overall calorie intake, 3-5 servings of fruits/vegetables daily, reducing fast food intake, consuming healthier snacks, decreasing soda and/or juice intake, moderation in carbohydrate intake, increasing intake of lean protein, reducing intake of saturated fat and trans fat and reducing intake of cholesterol  Exercise recommendations include moderate aerobic physical activity for 150 minutes/week and exercising 3-5 times per week

## 2022-04-12 NOTE — PROGRESS NOTES
Assessment/Plan:      1  Medicare annual wellness visit, subsequent    2  BMI 26 0-26 9,adult    3  Gastroesophageal reflux disease without esophagitis  Assessment & Plan:  · Patient takes pepcid 20mg qhs which helps  · She is concerned because she has a family history stomach cancer  · She had an endoscopy several years ago but records are not available  · Will continue to monitor for change to symptoms        4  Diverticulitis  Assessment & Plan:  stable  Has not had flare up in >5 years ago      5  Microscopic hematuria  Assessment & Plan:  · Follows with urology  · Has cystoscopy scheduled for 5/2022 with Dr Dolly Dobbs      6  Malignant neoplasm of female breast, unspecified estrogen receptor status, unspecified laterality, unspecified site of breast [facility])  Assessment & Plan:  · Patient stopped getting mammograms  · She is interested in repeat mammogram today      Orders:  -     Mammo screening bilateral w cad; Future; Expected date: 04/12/2022    7  Encounter for screening mammogram for malignant neoplasm of breast   -     Mammo screening bilateral w cad; Future; Expected date: 04/12/2022          Subjective:      Patient ID: Nalini Rizvi is a 79 y o  female presents today for routine follow up and Medicare wellness visit  Her main complaint is psoriasis that is flaring  She follows with dermatology and receives uv light treatments  She declined stelara/humira treatment  She complains mostly of itching  HPI    The following portions of the patient's history were reviewed and updated as appropriate: allergies, current medications, past family history, past medical history, past social history, past surgical history and problem list     Review of Systems   Constitutional: Negative for activity change, chills, diaphoresis and fever  HENT: Negative for ear pain, hearing loss, postnasal drip, rhinorrhea, sinus pressure, sinus pain, sneezing and sore throat      Respiratory: Negative for cough, chest tightness, shortness of breath and wheezing  Cardiovascular: Negative for chest pain, palpitations and leg swelling  Gastrointestinal: Negative for abdominal pain, blood in stool, constipation, diarrhea, nausea and vomiting  Genitourinary: Negative for dysuria, frequency, hematuria and urgency  Musculoskeletal: Negative for arthralgias and myalgias  Neurological: Negative for dizziness, syncope, weakness, light-headedness, numbness and headaches  Objective:      /66 (BP Location: Left arm, Patient Position: Sitting, Cuff Size: Standard)   Pulse 85   Temp 97 5 °F (36 4 °C) (Tympanic)   Resp 18   Ht 5' 7" (1 702 m)   Wt 75 5 kg (166 lb 6 4 oz)   SpO2 100%   BMI 26 06 kg/m²          Physical Exam  Vitals reviewed  Constitutional:       General: She is not in acute distress  Appearance: She is well-developed  She is not diaphoretic  HENT:      Head: Normocephalic and atraumatic  Right Ear: External ear normal       Left Ear: External ear normal       Nose: Nose normal  No congestion  Mouth/Throat:      Mouth: Mucous membranes are moist       Pharynx: Oropharynx is clear  No oropharyngeal exudate  Eyes:      General: No scleral icterus  Right eye: No discharge  Left eye: No discharge  Conjunctiva/sclera: Conjunctivae normal       Pupils: Pupils are equal, round, and reactive to light  Neck:      Thyroid: No thyromegaly  Vascular: No JVD  Trachea: No tracheal deviation  Cardiovascular:      Rate and Rhythm: Normal rate and regular rhythm  Heart sounds: Normal heart sounds  No murmur heard  No friction rub  No gallop  Pulmonary:      Effort: Pulmonary effort is normal  No respiratory distress  Breath sounds: Normal breath sounds  No wheezing or rales  Chest:      Chest wall: No tenderness  Abdominal:      General: Bowel sounds are normal  There is no distension  Palpations: Abdomen is soft  Tenderness:  There is no abdominal tenderness  There is no right CVA tenderness, left CVA tenderness, guarding or rebound  Musculoskeletal:         General: Normal range of motion  Cervical back: Normal range of motion and neck supple  No tenderness  Right lower leg: No edema  Left lower leg: No edema  Lymphadenopathy:      Cervical: No cervical adenopathy  Skin:     General: Skin is warm and dry  Neurological:      Mental Status: She is alert and oriented to person, place, and time  Mental status is at baseline  Psychiatric:         Mood and Affect: Mood normal          Behavior: Behavior normal          Thought Content:  Thought content normal          Judgment: Judgment normal

## 2022-04-12 NOTE — ASSESSMENT & PLAN NOTE
· Patient takes pepcid 20mg qhs which helps  · She is concerned because she has a family history stomach cancer  · She had an endoscopy several years ago but records are not available  · Will continue to monitor for change to symptoms

## 2022-05-04 ENCOUNTER — HOSPITAL ENCOUNTER (OUTPATIENT)
Dept: RADIOLOGY | Facility: HOSPITAL | Age: 82
Discharge: HOME | End: 2022-05-04
Payer: MEDICARE

## 2022-05-04 DIAGNOSIS — R31.29 MICROSCOPIC HEMATURIA: ICD-10-CM

## 2022-05-04 PROCEDURE — 76770 US EXAM ABDO BACK WALL COMP: CPT

## 2022-05-19 ENCOUNTER — HOSPITAL ENCOUNTER (OUTPATIENT)
Dept: RADIOLOGY | Age: 82
Discharge: HOME | End: 2022-05-19
Payer: MEDICARE

## 2022-05-19 VITALS — WEIGHT: 166 LBS | HEIGHT: 67 IN | BODY MASS INDEX: 26.06 KG/M2

## 2022-05-19 DIAGNOSIS — C50.919 MALIGNANT NEOPLASM OF FEMALE BREAST, UNSPECIFIED ESTROGEN RECEPTOR STATUS, UNSPECIFIED LATERALITY, UNSPECIFIED SITE OF BREAST (HCC): ICD-10-CM

## 2022-05-19 DIAGNOSIS — Z12.31 ENCOUNTER FOR SCREENING MAMMOGRAM FOR MALIGNANT NEOPLASM OF BREAST: ICD-10-CM

## 2022-05-19 PROCEDURE — 77067 SCR MAMMO BI INCL CAD: CPT

## 2022-05-19 PROCEDURE — 77063 BREAST TOMOSYNTHESIS BI: CPT

## 2022-05-20 ENCOUNTER — PROCEDURE VISIT (OUTPATIENT)
Dept: UROLOGY | Facility: AMBULATORY SURGERY CENTER | Age: 82
End: 2022-05-20
Payer: MEDICARE

## 2022-05-20 VITALS
SYSTOLIC BLOOD PRESSURE: 120 MMHG | WEIGHT: 168 LBS | HEIGHT: 67 IN | DIASTOLIC BLOOD PRESSURE: 78 MMHG | BODY MASS INDEX: 26.37 KG/M2 | HEART RATE: 88 BPM

## 2022-05-20 DIAGNOSIS — R31.29 MICROSCOPIC HEMATURIA: Primary | ICD-10-CM

## 2022-05-20 DIAGNOSIS — R31.0 GROSS HEMATURIA: ICD-10-CM

## 2022-05-20 LAB
SL AMB  POCT GLUCOSE, UA: NORMAL
SL AMB LEUKOCYTE ESTERASE,UA: NORMAL
SL AMB POCT BILIRUBIN,UA: NORMAL
SL AMB POCT BLOOD,UA: NORMAL
SL AMB POCT CLARITY,UA: CLEAR
SL AMB POCT COLOR,UA: YELLOW
SL AMB POCT KETONES,UA: NORMAL
SL AMB POCT NITRITE,UA: NORMAL
SL AMB POCT PH,UA: 5
SL AMB POCT SPECIFIC GRAVITY,UA: 1.01
SL AMB POCT URINE PROTEIN: NORMAL
SL AMB POCT UROBILINOGEN: 0.2

## 2022-05-20 PROCEDURE — 81002 URINALYSIS NONAUTO W/O SCOPE: CPT | Performed by: UROLOGY

## 2022-05-20 PROCEDURE — 52000 CYSTOURETHROSCOPY: CPT | Performed by: UROLOGY

## 2022-05-20 RX ORDER — HYDROXYZINE HYDROCHLORIDE 10 MG/1
TABLET, FILM COATED ORAL
COMMUNITY

## 2022-05-20 NOTE — PROGRESS NOTES
Patient presents for microscopic hematuria evaluation  She has history in the past and underwent biopsy of the bladder with Dr Samia Stacy in 2009 which was benign  As it has been over 10 years, was determined that she should proceed with another workup  Ultrasound did not reveal any pathology  She has no complaints  No gross hematuria  Cystoscopy     Date/Time 5/20/2022 8:58 AM     Performed by  Carlos Manuel Rizvi MD     Authorized by Carlos Manuel Rizvi MD          Procedure Details: Additional Procedure Details: Patient was prepped with Betadine  2% lidocaine jelly was instilled per urethra  The 16 French flexible cystoscope was placed  There is no obvious bladder abnormality identified  There is no suspicious mass or lesion  Both ureteral orifices are normal   No significant cystocele  Plan  Patient was reassured that there is no abnormality identified on her workup  No need for continued follow-up  May follow-up as needed  She understands that she likely has benign micro hematuria

## 2022-05-20 NOTE — LETTER
May 20, 2022     [address], [address] [place] [address]    Patient: Joanna Sanchez   YOB: 1940   Date of Visit: 5/20/2022       Dear Dr Deedee Low:    Thank you for referring Joanna Sanchez to me for evaluation  Below are my notes for this consultation  If you have questions, please do not hesitate to call me  I look forward to following your patient along with you  Sincerely,        Beka Moeller MD        CC: No Recipients  Beka Moeller MD  5/20/2022  8:59 AM  Sign when Signing Visit  Patient presents for microscopic hematuria evaluation  She has history in the past and underwent biopsy of the bladder with Dr Helena Jacques in 2009 which was benign  As it has been over 10 years, was determined that she should proceed with another workup  Ultrasound did not reveal any pathology  She has no complaints  No gross hematuria  Cystoscopy     Date/Time 5/20/2022 8:58 AM     Performed by  Beka Moeller MD     Authorized by Beka Moeller MD          Procedure Details: Additional Procedure Details: Patient was prepped with Betadine  2% lidocaine jelly was instilled per urethra  The 16 French flexible cystoscope was placed  There is no obvious bladder abnormality identified  There is no suspicious mass or lesion  Both ureteral orifices are normal   No significant cystocele  Plan  Patient was reassured that there is no abnormality identified on her workup  No need for continued follow-up  May follow-up as needed  She understands that she likely has benign micro hematuria

## 2022-08-31 ENCOUNTER — APPOINTMENT (OUTPATIENT)
Dept: LAB | Facility: CLINIC | Age: 82
End: 2022-08-31
Payer: MEDICARE

## 2022-08-31 DIAGNOSIS — L29.9 PRURITUS OF SKIN: ICD-10-CM

## 2022-08-31 DIAGNOSIS — L93.0 LUPUS ERYTHEMATOSUS, UNSPECIFIED FORM: ICD-10-CM

## 2022-08-31 DIAGNOSIS — L40.8 OTHER PSORIASIS: ICD-10-CM

## 2022-08-31 DIAGNOSIS — Z79.899 ENCOUNTER FOR LONG-TERM (CURRENT) USE OF OTHER MEDICATIONS: ICD-10-CM

## 2022-08-31 LAB
ALBUMIN SERPL BCP-MCNC: 3.5 G/DL (ref 3.5–5)
ALP SERPL-CCNC: 51 U/L (ref 46–116)
ALT SERPL W P-5'-P-CCNC: 28 U/L (ref 12–78)
ANION GAP SERPL CALCULATED.3IONS-SCNC: 3 MMOL/L (ref 4–13)
AST SERPL W P-5'-P-CCNC: 18 U/L (ref 5–45)
BASOPHILS # BLD AUTO: 0.11 THOUSANDS/ΜL (ref 0–0.1)
BASOPHILS NFR BLD AUTO: 2 % (ref 0–1)
BILIRUB SERPL-MCNC: 0.74 MG/DL (ref 0.2–1)
BUN SERPL-MCNC: 16 MG/DL (ref 5–25)
CALCIUM SERPL-MCNC: 9.1 MG/DL (ref 8.3–10.1)
CHLORIDE SERPL-SCNC: 110 MMOL/L (ref 96–108)
CO2 SERPL-SCNC: 27 MMOL/L (ref 21–32)
CREAT SERPL-MCNC: 0.84 MG/DL (ref 0.6–1.3)
EOSINOPHIL # BLD AUTO: 0.67 THOUSAND/ΜL (ref 0–0.61)
EOSINOPHIL NFR BLD AUTO: 12 % (ref 0–6)
ERYTHROCYTE [DISTWIDTH] IN BLOOD BY AUTOMATED COUNT: 13.9 % (ref 11.6–15.1)
GFR SERPL CREATININE-BSD FRML MDRD: 64 ML/MIN/1.73SQ M
GLUCOSE P FAST SERPL-MCNC: 89 MG/DL (ref 65–99)
HCT VFR BLD AUTO: 35.9 % (ref 34.8–46.1)
HGB BLD-MCNC: 11.6 G/DL (ref 11.5–15.4)
IMM GRANULOCYTES # BLD AUTO: 0.02 THOUSAND/UL (ref 0–0.2)
IMM GRANULOCYTES NFR BLD AUTO: 0 % (ref 0–2)
LYMPHOCYTES # BLD AUTO: 1.76 THOUSANDS/ΜL (ref 0.6–4.47)
LYMPHOCYTES NFR BLD AUTO: 31 % (ref 14–44)
MCH RBC QN AUTO: 30.4 PG (ref 26.8–34.3)
MCHC RBC AUTO-ENTMCNC: 32.3 G/DL (ref 31.4–37.4)
MCV RBC AUTO: 94 FL (ref 82–98)
MONOCYTES # BLD AUTO: 0.56 THOUSAND/ΜL (ref 0.17–1.22)
MONOCYTES NFR BLD AUTO: 10 % (ref 4–12)
NEUTROPHILS # BLD AUTO: 2.49 THOUSANDS/ΜL (ref 1.85–7.62)
NEUTS SEG NFR BLD AUTO: 45 % (ref 43–75)
NRBC BLD AUTO-RTO: 0 /100 WBCS
PLATELET # BLD AUTO: 239 THOUSANDS/UL (ref 149–390)
PMV BLD AUTO: 9.5 FL (ref 8.9–12.7)
POTASSIUM SERPL-SCNC: 3.9 MMOL/L (ref 3.5–5.3)
PROT SERPL-MCNC: 7.2 G/DL (ref 6.4–8.4)
RBC # BLD AUTO: 3.82 MILLION/UL (ref 3.81–5.12)
SODIUM SERPL-SCNC: 140 MMOL/L (ref 135–147)
TSH SERPL DL<=0.05 MIU/L-ACNC: 2.53 UIU/ML (ref 0.45–4.5)
WBC # BLD AUTO: 5.61 THOUSAND/UL (ref 4.31–10.16)

## 2022-08-31 PROCEDURE — 86235 NUCLEAR ANTIGEN ANTIBODY: CPT

## 2022-08-31 PROCEDURE — 84165 PROTEIN E-PHORESIS SERUM: CPT

## 2022-08-31 PROCEDURE — 84443 ASSAY THYROID STIM HORMONE: CPT

## 2022-08-31 PROCEDURE — 85025 COMPLETE CBC W/AUTO DIFF WBC: CPT

## 2022-08-31 PROCEDURE — 80053 COMPREHEN METABOLIC PANEL: CPT

## 2022-08-31 PROCEDURE — 86038 ANTINUCLEAR ANTIBODIES: CPT

## 2022-08-31 PROCEDURE — 36415 COLL VENOUS BLD VENIPUNCTURE: CPT

## 2022-09-01 LAB
ALBUMIN SERPL ELPH-MCNC: 4.22 G/DL (ref 3.5–5)
ALBUMIN SERPL ELPH-MCNC: 59.4 % (ref 52–65)
ALPHA1 GLOB SERPL ELPH-MCNC: 0.26 G/DL (ref 0.1–0.4)
ALPHA1 GLOB SERPL ELPH-MCNC: 3.6 % (ref 2.5–5)
ALPHA2 GLOB SERPL ELPH-MCNC: 0.63 G/DL (ref 0.4–1.2)
ALPHA2 GLOB SERPL ELPH-MCNC: 8.9 % (ref 7–13)
BETA GLOB ABNORMAL SERPL ELPH-MCNC: 0.42 G/DL (ref 0.4–0.8)
BETA1 GLOB SERPL ELPH-MCNC: 5.9 % (ref 5–13)
BETA2 GLOB SERPL ELPH-MCNC: 5.8 % (ref 2–8)
BETA2+GAMMA GLOB SERPL ELPH-MCNC: 0.41 G/DL (ref 0.2–0.5)
ENA SS-A AB SER-ACNC: <0.2 AI (ref 0–0.9)
ENA SS-B AB SER-ACNC: <0.2 AI (ref 0–0.9)
GAMMA GLOB ABNORMAL SERPL ELPH-MCNC: 1.16 G/DL (ref 0.5–1.6)
GAMMA GLOB SERPL ELPH-MCNC: 16.4 % (ref 12–22)
IGG/ALB SER: 1.46 {RATIO} (ref 1.1–1.8)
PROT PATTERN SERPL ELPH-IMP: NORMAL
PROT SERPL-MCNC: 7.1 G/DL (ref 6.4–8.2)

## 2022-09-01 PROCEDURE — 84165 PROTEIN E-PHORESIS SERUM: CPT | Performed by: PATHOLOGY

## 2022-09-02 LAB — RYE IGE QN: NEGATIVE

## 2022-09-07 ENCOUNTER — RA CDI HCC (OUTPATIENT)
Dept: OTHER | Facility: HOSPITAL | Age: 82
End: 2022-09-07

## 2022-09-12 ENCOUNTER — OFFICE VISIT (OUTPATIENT)
Dept: FAMILY MEDICINE CLINIC | Facility: CLINIC | Age: 82
End: 2022-09-12
Payer: MEDICARE

## 2022-09-12 VITALS
SYSTOLIC BLOOD PRESSURE: 110 MMHG | RESPIRATION RATE: 18 BRPM | DIASTOLIC BLOOD PRESSURE: 70 MMHG | WEIGHT: 154.2 LBS | OXYGEN SATURATION: 96 % | BODY MASS INDEX: 24.15 KG/M2 | HEART RATE: 96 BPM | TEMPERATURE: 98.3 F

## 2022-09-12 DIAGNOSIS — Z12.11 SCREEN FOR COLON CANCER: Primary | ICD-10-CM

## 2022-09-12 DIAGNOSIS — F43.22 ADJUSTMENT DISORDER WITH ANXIOUS MOOD: ICD-10-CM

## 2022-09-12 DIAGNOSIS — R31.29 MICROSCOPIC HEMATURIA: ICD-10-CM

## 2022-09-12 DIAGNOSIS — L40.9 PSORIASIS: ICD-10-CM

## 2022-09-12 DIAGNOSIS — R63.4 UNINTENTIONAL WEIGHT LOSS: ICD-10-CM

## 2022-09-12 DIAGNOSIS — K21.9 GASTROESOPHAGEAL REFLUX DISEASE WITHOUT ESOPHAGITIS: Chronic | ICD-10-CM

## 2022-09-12 PROCEDURE — 99214 OFFICE O/P EST MOD 30 MIN: CPT | Performed by: FAMILY MEDICINE

## 2022-09-12 RX ORDER — CLOBETASOL PROPIONATE 0.46 MG/ML
SOLUTION TOPICAL
COMMUNITY
Start: 2022-08-28

## 2022-09-12 NOTE — ASSESSMENT & PLAN NOTE
Unchanged  · Follows with Dermatology  · Previously treated with UV light which no longer helps  · Rash over chest that was recently biopsied  · May have an allergy component  · Encouraged trial of zyrtec/loratadine  · Offered allergy/immunology referral but patient declines

## 2022-09-12 NOTE — ASSESSMENT & PLAN NOTE
Current flare up  · Not improved with pepcid 20mg qhs  · Switch to omeprazole 20mg for 1 month  · If unchanged, will refer to GI for endoscopy

## 2022-09-12 NOTE — PROGRESS NOTES
Assessment/Plan:      1  Screen for colon cancer    2  Gastroesophageal reflux disease without esophagitis  Assessment & Plan:  Current flare up  Not improved with pepcid 20mg qhs  Switch to omeprazole 20mg for 1 month  If unchanged, will refer to GI for endoscopy      3  Microscopic hematuria  Assessment & Plan:  Stable  Followed with urology  Normal cystoscopy  No need for further evaluation      4  Psoriasis  Assessment & Plan:  Unchanged  Follows with Dermatology  Previously treated with UV light which no longer helps  Rash over chest that was recently biopsied  May have an allergy component  Encouraged trial of zyrtec/loratadine  Offered allergy/immunology referral but patient declines      5  Adjustment disorder with anxious mood  Assessment & Plan:  Waxes and wanes  In the setting of her husbands waning health  Discussed with patient on treatment options  Will trial hydroxyzine 10mg prn for anxiety  If this does not help would be willing to trial short course of 0 25mg xanax      6  Unintentional weight loss  Assessment & Plan:  Likely due to increase in stress and abnormal eating patterns  Encouraged patient to try ensure or boost drinks if she skips a meal  Follow up prn                Subjective:      Patient ID: Brennon Barrientos is a 79 y o  female presents today for multiple concerns  HPI    The following portions of the patient's history were reviewed and updated as appropriate: allergies, current medications, past family history, past medical history, past social history, past surgical history and problem list     Review of Systems   Constitutional: Positive for appetite change and unexpected weight change  Negative for activity change, chills, diaphoresis and fever  HENT: Negative for ear pain, hearing loss, postnasal drip, rhinorrhea, sinus pressure, sinus pain, sneezing and sore throat  Respiratory: Negative for cough, chest tightness, shortness of breath and wheezing  Cardiovascular: Negative for chest pain, palpitations and leg swelling  Gastrointestinal: Negative for abdominal pain, blood in stool, constipation, diarrhea, nausea and vomiting  Genitourinary: Negative for dysuria, frequency, hematuria and urgency  Musculoskeletal: Negative for arthralgias and myalgias  Skin: Positive for rash  Neurological: Negative for dizziness, syncope, weakness, light-headedness, numbness and headaches  Psychiatric/Behavioral: Negative for agitation, behavioral problems, confusion, decreased concentration, dysphoric mood, hallucinations, self-injury, sleep disturbance and suicidal ideas  The patient is nervous/anxious  The patient is not hyperactive  Objective:      /70 (BP Location: Left arm, Patient Position: Sitting, Cuff Size: Standard)   Pulse 96   Temp 98 3 °F (36 8 °C) (Temporal)   Resp 18   Wt 69 9 kg (154 lb 3 2 oz)   SpO2 96%   BMI 24 15 kg/m²          Physical Exam  Vitals reviewed  Constitutional:       General: She is not in acute distress  Appearance: She is well-developed  She is not diaphoretic  HENT:      Head: Normocephalic and atraumatic  Right Ear: External ear normal       Left Ear: External ear normal       Nose: Nose normal  No congestion  Mouth/Throat:      Mouth: Mucous membranes are moist       Pharynx: Oropharynx is clear  No oropharyngeal exudate  Eyes:      General: No scleral icterus  Right eye: No discharge  Left eye: No discharge  Conjunctiva/sclera: Conjunctivae normal       Pupils: Pupils are equal, round, and reactive to light  Neck:      Thyroid: No thyromegaly  Vascular: No JVD  Trachea: No tracheal deviation  Cardiovascular:      Rate and Rhythm: Normal rate and regular rhythm  Heart sounds: Normal heart sounds  No murmur heard  No friction rub  No gallop  Pulmonary:      Effort: Pulmonary effort is normal  No respiratory distress        Breath sounds: Normal breath sounds  No wheezing or rales  Chest:      Chest wall: No tenderness  Abdominal:      General: Bowel sounds are normal  There is no distension  Palpations: Abdomen is soft  Tenderness: There is no abdominal tenderness  There is no right CVA tenderness, left CVA tenderness, guarding or rebound  Musculoskeletal:         General: Normal range of motion  Cervical back: Normal range of motion and neck supple  No tenderness  Right lower leg: No edema  Left lower leg: No edema  Lymphadenopathy:      Cervical: No cervical adenopathy  Skin:     General: Skin is warm and dry  Comments: Papular rash on chest and arms   Neurological:      Mental Status: She is alert and oriented to person, place, and time  Mental status is at baseline  Psychiatric:         Mood and Affect: Mood is anxious  Affect is tearful  Behavior: Behavior normal          Thought Content:  Thought content normal          Judgment: Judgment normal

## 2022-09-12 NOTE — ASSESSMENT & PLAN NOTE
Waxes and wanes  · In the setting of her husbands waning health  · Discussed with patient on treatment options  · Will trial hydroxyzine 10mg prn for anxiety  · If this does not help would be willing to trial short course of 0 25mg xanax

## 2022-09-12 NOTE — ASSESSMENT & PLAN NOTE
· Likely due to increase in stress and abnormal eating patterns  · Encouraged patient to try ensure or boost drinks if she skips a meal  · Follow up prn

## 2022-09-26 DIAGNOSIS — L40.9 PSORIASIS: Primary | ICD-10-CM

## 2022-10-07 ENCOUNTER — TELEPHONE (OUTPATIENT)
Dept: DERMATOLOGY | Facility: CLINIC | Age: 82
End: 2022-10-07

## 2022-10-07 NOTE — TELEPHONE ENCOUNTER
Patient left message in VM to schedule dermatology consult (referral in chart)  Returned call and left message for pt to call back for assistance with scheduling

## 2022-10-10 ENCOUNTER — TELEPHONE (OUTPATIENT)
Dept: DERMATOLOGY | Facility: CLINIC | Age: 82
End: 2022-10-10

## 2022-11-01 ENCOUNTER — CONSULT (OUTPATIENT)
Dept: DERMATOLOGY | Facility: CLINIC | Age: 82
End: 2022-11-01

## 2022-11-01 VITALS — HEIGHT: 67 IN | BODY MASS INDEX: 24.22 KG/M2 | TEMPERATURE: 98.1 F | WEIGHT: 154.3 LBS

## 2022-11-01 DIAGNOSIS — L40.9 PSORIASIS: ICD-10-CM

## 2022-11-01 NOTE — LETTER
November 1, 2022     [address]  [place] [address]    Patient: Aidee Salazar   YOB: 1940   Date of Visit: 11/1/2022       Dear Dr Kisha Marx: Thank you for referring Aidee Salazar to me for evaluation  Below are my notes for this consultation  If you have questions, please do not hesitate to call me  I look forward to following your patient along with you  Sincerely,        Marshia Osgood, MD        CC: Gunnar Ludwig, PA-C Marshia Osgood, MD  11/1/2022  5:43 PM  Signed  [facility] Note     Patient Name: Aidee Salazar  Encounter Date: 11/1/2022     Have you been cared for by a [facility] Dermatologist in the last 3 years and, if so, which description applies to you? NO  I am considered a "new" patient and must complete all patient intake questions  I am FEMALE/of child-bearing potential     REVIEW OF SYSTEMS:  Have you recently had or currently have any of the following? · Recent fever or chills? No  · Any non-healing wound? No  · Are you pregnant or planning to become pregnant? No  · Are you currently or planning to be nursing or breast feeding? No   PAST MEDICAL HISTORY:  Have you personally ever had or currently have any of the following? If "YES," then please provide more detail  · Skin cancer (such as Melanoma, Basal Cell Carcinoma, Squamous Cell Carcinoma? No  · Tuberculosis, HIV/AIDS, Hepatitis B or C: No  · Systemic Immunosuppression such as Diabetes, Biologic or Immunotherapy, Chemotherapy, Organ Transplantation, Bone Marrow Transplantation YES, CHEMOTHERAPY   · Radiation Treatment No   FAMILY HISTORY:  Any "first degree relatives" (parent, brother, sister, or child) with the following? • Skin Cancer, Pancreatic or Other Cancer?  YES, Stomach cancer, Lymphoma, Pancreatic cancer, colon cancer    PATIENT EXPERIENCE:    • Do you want the Dermatologist to perform a COMPLETE skin exam today including a clinical examination under the "bra and underwear" areas? NO  • If necessary, do we have your permission to call and leave a detailed message on your Preferred Phone number that includes your specific medical information? Yes      Allergies   Allergen Reactions   • No Active Allergies       Current Outpatient Medications:   •  acetaminophen (TYLENOL) 100 mg/mL solution, Take 10 mg/kg by mouth every 4 (four) hours as needed for fever, Disp: , Rfl:   •  Ascorbic Acid (VITAMIN C) 1000 MG tablet, Take by mouth daily , Disp: , Rfl:   •  B-Complex-C CAPS, Take by mouth daily , Disp: , Rfl:   •  clobetasol (TEMOVATE) 0 05 % external solution, APPLY TWO TIMES A DAY TO SCALP AS NEEDED, Disp: , Rfl:   •  famotidine (PEPCID) 20 mg tablet, Take 20 mg by mouth daily  , Disp: , Rfl:   •  hydrOXYzine HCL (ATARAX) 10 mg tablet, hydroxyzine HCl 10 mg tablet  TAKE ONE TO TWO TABLETS BY MOUTH BEFORE BEDTIME, Disp: , Rfl:   •  magnesium 30 MG tablet, Take 30 mg by mouth 2 (two) times a day, Disp: , Rfl:           • Whom besides the patient is providing clinical information about today's encounter?   o NO ADDITIONAL HISTORIAN (patient alone provided history)    Physical Exam and Assessment/Plan by Diagnosis:      1  PSORIASIS    Physical Exam:  • Anatomic Location Affected:  Back and chest and scalp   • Morphological Description: Thicken patches scalp and and thinner patches on back and chest   • Severity: moderate  • Body Percent Affected: 30%  • Pertinent Positives:  • Pertinent Negatives: Additional History of Present Condition:  Located on back, chest, and scalp  Presents for 30+ years  Patient has tried phototherapy and clobetasol external solution 0 05%  Patient is here for second opinion for biologic treatment       Assessment and Plan:  Based on a thorough discussion of this condition and the management approach to it (including a comprehensive discussion of the known risks, side effects and potential benefits of treatment), the patient (family) agrees to implement the following specific plan:  • Discussed the use of Tremfya or [facility]- Dr Lindsay Angel agrees with use of one of the biologics or continue the phototherapy treatment if desired  I agree with IL23 choice due to history of breast cancer in distant past   • Please continue to follow with your other Dermatologist to continue treatment for psoriasis        Psoriasis is a chronic inflammatory condition that causes the body to make new skin cells in days rather than weeks  As these cells pile up on the surface of the skin, you may see thick, scaly patches of thickened skin  Psoriasis affects 2-4% of males and females  It can start at any age including childhood, with peaks of onset at 15-25 years and 50-60 years  It tends to persist lifelong, fluctuating in extent and severity  It is particularly common in Caucasians but may affect people of any race  About one-third of patients with psoriasis have family members with psoriasis  Psoriasis is multifactorial  It is classified as an immune-mediated inflammatory disease (IMID)  Genetic factors are important and influence the type of psoriasis and response to treatment  What are the signs and symptoms of psoriasis? There are many different types of psoriasis that each have present uniquely  The types of psoriasis include:    Plaque psoriasis: About 80% to 90% of people who have psoriasis develop this type  When plaque psoriasis appears, you may see:  Plaque psoriasis usually presents with symmetrically distributed, red, scaly plaques with well-defined edges  The scale is typically silvery white, except in skin folds where the plaques often appear shiny and they may have a moist peeling surface  The most common sites are scalp, elbows and knees, but any part of the skin can be involved  The plaques are usually very persistent without treatment    Itch is mostly mild but may be severe in some patients, leading to scratching and lichenification (thickened leathery skin with increased skin markings)  Painful skin cracks or fissures may occur  When psoriatic plaques clear up, they may leave brown or pale marks that can be expected to fade over several months  Guttate psoriasis: When someone gets this type of psoriasis, you often see tiny bumps appear on the skin quite suddenly  The bumps tend to cover much of the torso, legs, and arms  Sometimes, the bumps also develop on the face, scalp, and ears  No matter where they appear, the bumps tend to be:   • Small and scaly  • [place]-colored to pink  • Temporary, clearing in a few weeks or months without treatment  When guttate psoriasis clears, it may never return  Why this happens is still a bit of a mystery  Guttate psoriasis tends to develop in children and young adults who've had an infection, such as strep throat  It's possible that when the infection clears so does guttate psoriasis  It's also possible to have:  • Guttate psoriasis for life  • See the guttate psoriasis clear and plaque psoriasis develop later in life  • Plaque psoriasis when you develop guttate psoriasis  There's no way to predict what will happen after the first flare-up of guttate psoriasis clears  Inverse psoriasis: This type of psoriasis develops in areas where skin touches skin, such as the armpits, genitals, and crease of the buttocks  Where the inverse psoriasis appears, you're likely to notice:  • Smooth, red patches of skin that look raw  • Little, if any, silvery-white coating  • Sore or painful skin  Other names for this type of psoriasis are intertriginous psoriasis or flexural psoriasis  Pustular psoriasis: This type of psoriasis causes pus-filled bumps that usually appear only on the feet and hands  While the pus-filled bumps may look like an infection, the skin is not infected  The bumps don't contain bacteria or anything else that could cause an infection    Where pustular psoriasis appears, you tend to notice:  • Red, swollen skin that is dotted with pus-filled bumps  • Extremely sore or painful skin  • Brown dots (and sometimes scale) appear as the pus-filled bumps dry  Pustular psoriasis can make just about any activity that requires your hands or feet, such as typing or walking, unbearably painful  Pustular psoriasis (generalized): Serious and life-threatening, this rare type of psoriasis causes pus-filled bumps to develop on much of the skin  Also called [place]ch psoriasis, a flare-up causes this sequence of events:  1  Skin on most of the body suddenly turns dry, red, and tender  2  Within hours, pus-filled bumps cover most of the skin  3  Often within a day, the pus-filled bumps break open and pools of pus leak onto the skin  4  As the pus dries (usually within 24 to 48 hours), the skin dries out and peels (as shown in this picture)  5  When the dried skin peels off, you see a smooth, glazed surface  6  In a few days or weeks, you may see a new crop of pus-filled bumps covering most of the skin, as the cycle repeats itself  Anyone with pustular psoriasis also feels very sick, and may develop a fever, headache, muscle weakness, and other symptoms  Medical care is often necessary to save the person's life  Erythrodermic psoriasis: Serious and life-threatening, this type of psoriasis requires immediate medical care  When someone develops erythrodermic psoriasis, you may notice:  • Skin on most of the body looks burnt  • Chills, fever, and the person looks extremely ill  • Muscle weakness, a rapid pulse, and severe itch  The person may also be unable to keep warm, so hypothermia can set in quickly  Most people who develop this type of psoriasis already have another type of psoriasis  Before developing erythrodermic psoriasis, they often notice that their psoriasis is worsening or not improving with treatment   If you notice either of these happening, see a board-certified dermatologist     Nails    Nail psoriasis: With any type of psoriasis, you may see changes to your fingernails or toenails  About half of the people who have plaque psoriasis see signs of psoriasis on their fingernails at some point2  When psoriasis affects the nails, you may notice:  • Tiny dents in your nails (called “nail pits”)  • White, yellow, or brown discoloration under one or more nails  • Crumbling, rough nails  • A nail lifting up so that it's no longer attached  • Buildup of skin cells beneath one or more nails, which lifts up the nail  Treatment and proper nail care can help you control nail psoriasis  Psoriatic arthritis: If you have psoriasis, it's important to pay attention to your joints  Some people who have psoriasis develop a type of arthritis called psoriatic arthritis  This is more likely to occur if you have severe psoriasis  Most people notice psoriasis on their skin years before they develop psoriatic arthritis  It's also possible to get psoriatic arthritis before psoriasis, but this is less common  When psoriatic arthritis develops, the signs can be subtle  At first, you may notice:  • A swollen and tender joint, especially in a finger or toe  • Heel pain  • Swelling on the back of your leg, just above your heel  • Stiffness in the morning that fades during the day  Like psoriasis, psoriatic arthritis cannot be cured  Treatment can prevent psoriatic arthritis from worsening, which is important  Allowed to progress, psoriatic arthritis can become disabling  Diagnosis and treatment of psoriasis   Psoriasis is usually diagnosed by clinical features, and skin biopsy if necessary  It is important to decrease factors that aggravate psoriasis  These include treating streptococcal infections, minimizing skin injuries, avoiding sun exposure if it exacerbates psoriasis, smoking, alcohol usage, decreasing stress, and maintaining an optimal body weight   Certain medications such as lithium, beta blockers, antimalarials, and NSAIDs have also been implicated  Suddenly stopping oral steroids or strong topical steroids can cause rebound disease  There are many categories of psoriasis treatments available  Topical therapy  Mild psoriasis is generally treated with topical agents alone  Which treatment is selected may depend on body site, extent and severity of psoriasis  • Emollients  • Coal tar preparations  • Dithranol  • Salicylic acid  • Vitamin D analogue (calcipotriol)  • Topical corticosteroids  • Calcineurin inhibitor (tacrolimus, pimecrolimus)  Phototherapy  Most psoriasis centres offer phototherapy with ultraviolet (UV) radiation, often in combination with topical or systemic agents  Types of phototherapy include  • Narrowband UVB  • Broadband UVB  • Photochemotherapy (PUVA)  • Targeted phototherapy  Systemic therapy  Moderate to severe psoriasis warrants treatment with a systemic agent and/or phototherapy  The most common treatments are:  • Methotrexate  • Ciclosporin  • Acitretin  Other medicines occasionally used for psoriasis include:  • Mycophenolate  • Apremilast  • Hydroxyurea  • Azathioprine  • 6-mercaptopurine  Systemic corticosteroids are best avoided due to a risk of severe withdrawal flare of psoriasis and adverse effects  Biologics or targeted therapies are reserved for conventional treatment-resistant severe psoriasis, mainly because of expense, as side effects compare favorably with other systemic agents  These include:  • Anti-tumour necrosis factor-alpha antagonists (anti-TNF?) infliximab, adalimumab and etanercept  • The interleukin (IL)-12/23 antagonist ustekinumab  • IL-17 antagonists such as secukinumab  Many other monoclonal antibodies are under investigation in the treatment of psoriasis                                Scribe Attestation    I,:  Micheal Barrientos am acting as a scribe while in the presence of the attending physician :       I,:  Efrain Ricks MD personally performed the services described in this documentation    as scribed in my presence :

## 2022-11-01 NOTE — PROGRESS NOTES
[facility] Note     Patient Name: Guille Goss  Encounter Date: 11/1/2022     Have you been cared for by a [place] [address] Dermatologist in the last 3 years and, if so, which description applies to you? NO  I am considered a "new" patient and must complete all patient intake questions  I am FEMALE/of child-bearing potential     REVIEW OF SYSTEMS:  Have you recently had or currently have any of the following? · Recent fever or chills? No  · Any non-healing wound? No  · Are you pregnant or planning to become pregnant? No  · Are you currently or planning to be nursing or breast feeding? No   PAST MEDICAL HISTORY:  Have you personally ever had or currently have any of the following? If "YES," then please provide more detail  · Skin cancer (such as Melanoma, Basal Cell Carcinoma, Squamous Cell Carcinoma? No  · Tuberculosis, HIV/AIDS, Hepatitis B or C: No  · Systemic Immunosuppression such as Diabetes, Biologic or Immunotherapy, Chemotherapy, Organ Transplantation, Bone Marrow Transplantation YES, CHEMOTHERAPY   · Radiation Treatment No   FAMILY HISTORY:  Any "first degree relatives" (parent, brother, sister, or child) with the following? • Skin Cancer, Pancreatic or Other Cancer? YES, Stomach cancer, Lymphoma, Pancreatic cancer, colon cancer    PATIENT EXPERIENCE:    • Do you want the Dermatologist to perform a COMPLETE skin exam today including a clinical examination under the "bra and underwear" areas? NO  • If necessary, do we have your permission to call and leave a detailed message on your Preferred Phone number that includes your specific medical information?   Yes      Allergies   Allergen Reactions   • No Active Allergies       Current Outpatient Medications:   •  acetaminophen (TYLENOL) 100 mg/mL solution, Take 10 mg/kg by mouth every 4 (four) hours as needed for fever, Disp: , Rfl:   •  Ascorbic Acid (VITAMIN C) 1000 MG tablet, Take by mouth daily , Disp: , Rfl:   •  B-Complex-C CAPS, Take by mouth daily , Disp: , Rfl:   •  clobetasol (TEMOVATE) 0 05 % external solution, APPLY TWO TIMES A DAY TO SCALP AS NEEDED, Disp: , Rfl:   •  famotidine (PEPCID) 20 mg tablet, Take 20 mg by mouth daily  , Disp: , Rfl:   •  hydrOXYzine HCL (ATARAX) 10 mg tablet, hydroxyzine HCl 10 mg tablet  TAKE ONE TO TWO TABLETS BY MOUTH BEFORE BEDTIME, Disp: , Rfl:   •  magnesium 30 MG tablet, Take 30 mg by mouth 2 (two) times a day, Disp: , Rfl:           • Whom besides the patient is providing clinical information about today's encounter?   o NO ADDITIONAL HISTORIAN (patient alone provided history)    Physical Exam and Assessment/Plan by Diagnosis:      1  PSORIASIS    Physical Exam:  • Anatomic Location Affected:  Back and chest and scalp   • Morphological Description: Thicken patches scalp and and thinner patches on back and chest   • Severity: moderate  • Body Percent Affected: 30%  • Pertinent Positives:  • Pertinent Negatives: Additional History of Present Condition:  Located on back, chest, and scalp  Presents for 30+ years  Patient has tried phototherapy and clobetasol external solution 0 05%  Patient is here for second opinion for biologic treatment  Assessment and Plan:  Based on a thorough discussion of this condition and the management approach to it (including a comprehensive discussion of the known risks, side effects and potential benefits of treatment), the patient (family) agrees to implement the following specific plan:  • Discussed the use of Tremfya or Verneda Param- Dr Claudia Baca agrees with use of one of the biologics or continue the phototherapy treatment if desired  I agree with IL23 choice due to history of breast cancer in distant past   • Please continue to follow with your other Dermatologist to continue treatment for psoriasis        Psoriasis is a chronic inflammatory condition that causes the body to make new skin cells in days rather than weeks   As these cells pile up on the surface of the skin, you may see thick, scaly patches of thickened skin  Psoriasis affects 2-4% of males and females  It can start at any age including childhood, with peaks of onset at 15-25 years and 50-60 years  It tends to persist lifelong, fluctuating in extent and severity  It is particularly common in Caucasians but may affect people of any race  About one-third of patients with psoriasis have family members with psoriasis  Psoriasis is multifactorial  It is classified as an immune-mediated inflammatory disease (IMID)  Genetic factors are important and influence the type of psoriasis and response to treatment  What are the signs and symptoms of psoriasis? There are many different types of psoriasis that each have present uniquely  The types of psoriasis include:    Plaque psoriasis: About 80% to 90% of people who have psoriasis develop this type  When plaque psoriasis appears, you may see:  Plaque psoriasis usually presents with symmetrically distributed, red, scaly plaques with well-defined edges  The scale is typically silvery white, except in skin folds where the plaques often appear shiny and they may have a moist peeling surface  The most common sites are scalp, elbows and knees, but any part of the skin can be involved  The plaques are usually very persistent without treatment  Itch is mostly mild but may be severe in some patients, leading to scratching and lichenification (thickened leathery skin with increased skin markings)  Painful skin cracks or fissures may occur  When psoriatic plaques clear up, they may leave brown or pale marks that can be expected to fade over several months  Guttate psoriasis: When someone gets this type of psoriasis, you often see tiny bumps appear on the skin quite suddenly  The bumps tend to cover much of the torso, legs, and arms  Sometimes, the bumps also develop on the face, scalp, and ears   No matter where they appear, the bumps tend to be:   • Small and scaly  • [place]-colored to pink  • Temporary, clearing in a few weeks or months without treatment  When guttate psoriasis clears, it may never return  Why this happens is still a bit of a mystery  Guttate psoriasis tends to develop in children and young adults who've had an infection, such as strep throat  It's possible that when the infection clears so does guttate psoriasis  It's also possible to have:  • Guttate psoriasis for life  • See the guttate psoriasis clear and plaque psoriasis develop later in life  • Plaque psoriasis when you develop guttate psoriasis  There's no way to predict what will happen after the first flare-up of guttate psoriasis clears  Inverse psoriasis: This type of psoriasis develops in areas where skin touches skin, such as the armpits, genitals, and crease of the buttocks  Where the inverse psoriasis appears, you're likely to notice:  • Smooth, red patches of skin that look raw  • Little, if any, silvery-white coating  • Sore or painful skin  Other names for this type of psoriasis are intertriginous psoriasis or flexural psoriasis  Pustular psoriasis: This type of psoriasis causes pus-filled bumps that usually appear only on the feet and hands  While the pus-filled bumps may look like an infection, the skin is not infected  The bumps don't contain bacteria or anything else that could cause an infection  Where pustular psoriasis appears, you tend to notice:  • Red, swollen skin that is dotted with pus-filled bumps  • Extremely sore or painful skin  • Brown dots (and sometimes scale) appear as the pus-filled bumps dry  Pustular psoriasis can make just about any activity that requires your hands or feet, such as typing or walking, unbearably painful  Pustular psoriasis (generalized): Serious and life-threatening, this rare type of psoriasis causes pus-filled bumps to develop on much of the skin  Also called von Zumbusch psoriasis, a flare-up causes this sequence of events:  1   Skin on most of the body suddenly turns dry, red, and tender  2  Within hours, pus-filled bumps cover most of the skin  3  Often within a day, the pus-filled bumps break open and pools of pus leak onto the skin  4  As the pus dries (usually within 24 to 48 hours), the skin dries out and peels (as shown in this picture)  5  When the dried skin peels off, you see a smooth, glazed surface  6  In a few days or weeks, you may see a new crop of pus-filled bumps covering most of the skin, as the cycle repeats itself  Anyone with pustular psoriasis also feels very sick, and may develop a fever, headache, muscle weakness, and other symptoms  Medical care is often necessary to save the person's life  Erythrodermic psoriasis: Serious and life-threatening, this type of psoriasis requires immediate medical care  When someone develops erythrodermic psoriasis, you may notice:  • Skin on most of the body looks burnt  • Chills, fever, and the person looks extremely ill  • Muscle weakness, a rapid pulse, and severe itch  The person may also be unable to keep warm, so hypothermia can set in quickly  Most people who develop this type of psoriasis already have another type of psoriasis  Before developing erythrodermic psoriasis, they often notice that their psoriasis is worsening or not improving with treatment  If you notice either of these happening, see a board-certified dermatologist     Nails    Nail psoriasis: With any type of psoriasis, you may see changes to your fingernails or toenails  About half of the people who have plaque psoriasis see signs of psoriasis on their fingernails at some point2    When psoriasis affects the nails, you may notice:  • Tiny dents in your nails (called “nail pits”)  • White, yellow, or brown discoloration under one or more nails  • Crumbling, rough nails  • A nail lifting up so that it's no longer attached  • Buildup of skin cells beneath one or more nails, which lifts up the nail  Treatment and proper nail care can help you control nail psoriasis  Psoriatic arthritis: If you have psoriasis, it's important to pay attention to your joints  Some people who have psoriasis develop a type of arthritis called psoriatic arthritis  This is more likely to occur if you have severe psoriasis  Most people notice psoriasis on their skin years before they develop psoriatic arthritis  It's also possible to get psoriatic arthritis before psoriasis, but this is less common  When psoriatic arthritis develops, the signs can be subtle  At first, you may notice:  • A swollen and tender joint, especially in a finger or toe  • Heel pain  • Swelling on the back of your leg, just above your heel  • Stiffness in the morning that fades during the day  Like psoriasis, psoriatic arthritis cannot be cured  Treatment can prevent psoriatic arthritis from worsening, which is important  Allowed to progress, psoriatic arthritis can become disabling  Diagnosis and treatment of psoriasis   Psoriasis is usually diagnosed by clinical features, and skin biopsy if necessary  It is important to decrease factors that aggravate psoriasis  These include treating streptococcal infections, minimizing skin injuries, avoiding sun exposure if it exacerbates psoriasis, smoking, alcohol usage, decreasing stress, and maintaining an optimal body weight  Certain medications such as lithium, beta blockers, antimalarials, and NSAIDs have also been implicated  Suddenly stopping oral steroids or strong topical steroids can cause rebound disease  There are many categories of psoriasis treatments available  Topical therapy  Mild psoriasis is generally treated with topical agents alone  Which treatment is selected may depend on body site, extent and severity of psoriasis    • Emollients  • Coal tar preparations  • Dithranol  • Salicylic acid  • Vitamin D analogue (calcipotriol)  • Topical corticosteroids  • Calcineurin inhibitor (tacrolimus, pimecrolimus)  Phototherapy  Most psoriasis centres offer phototherapy with ultraviolet (UV) radiation, often in combination with topical or systemic agents  Types of phototherapy include  • Narrowband UVB  • Broadband UVB  • Photochemotherapy (PUVA)  • Targeted phototherapy  Systemic therapy  Moderate to severe psoriasis warrants treatment with a systemic agent and/or phototherapy  The most common treatments are:  • Methotrexate  • Ciclosporin  • Acitretin  Other medicines occasionally used for psoriasis include:  • Mycophenolate  • Apremilast  • Hydroxyurea  • Azathioprine  • 6-mercaptopurine  Systemic corticosteroids are best avoided due to a risk of severe withdrawal flare of psoriasis and adverse effects  Biologics or targeted therapies are reserved for conventional treatment-resistant severe psoriasis, mainly because of expense, as side effects compare favorably with other systemic agents  These include:  • Anti-tumour necrosis factor-alpha antagonists (anti-TNF?) infliximab, adalimumab and etanercept  • The interleukin (IL)-12/23 antagonist ustekinumab  • IL-17 antagonists such as secukinumab  Many other monoclonal antibodies are under investigation in the treatment of psoriasis                                Scribe Attestation    I,:  Roldan Liriano am acting as a scribe while in the presence of the attending physician :       I,:  Derrell Jamil MD personally performed the services described in this documentation    as scribed in my presence :

## 2022-11-01 NOTE — PATIENT INSTRUCTIONS
1  PSORIASIS    Assessment and Plan:  Based on a thorough discussion of this condition and the management approach to it (including a comprehensive discussion of the known risks, side effects and potential benefits of treatment), the patient (family) agrees to implement the following specific plan:  Discussed the use of Tremfya or [place] Redder- Dr Karolina Wyatt agrees with use of one of the biologics or continue the phototherapy treatment if desired  Please continue to follow with your other Dermatologist to continue treatment for psoriasis        Psoriasis is a chronic inflammatory condition that causes the body to make new skin cells in days rather than weeks  As these cells pile up on the surface of the skin, you may see thick, scaly patches of thickened skin  Psoriasis affects 2-4% of males and females  It can start at any age including childhood, with peaks of onset at 15-25 years and 50-60 years  It tends to persist lifelong, fluctuating in extent and severity  It is particularly common in Caucasians but may affect people of any race  About one-third of patients with psoriasis have family members with psoriasis  Psoriasis is multifactorial  It is classified as an immune-mediated inflammatory disease (IMID)  Genetic factors are important and influence the type of psoriasis and response to treatment  What are the signs and symptoms of psoriasis? There are many different types of psoriasis that each have present uniquely  The types of psoriasis include:    Plaque psoriasis: About 80% to 90% of people who have psoriasis develop this type  When plaque psoriasis appears, you may see:  Plaque psoriasis usually presents with symmetrically distributed, red, scaly plaques with well-defined edges  The scale is typically silvery white, except in skin folds where the plaques often appear shiny and they may have a moist peeling surface   The most common sites are scalp, elbows and knees, but any part of the skin can be involved  The plaques are usually very persistent without treatment  Itch is mostly mild but may be severe in some patients, leading to scratching and lichenification (thickened leathery skin with increased skin markings)  Painful skin cracks or fissures may occur  When psoriatic plaques clear up, they may leave brown or pale marks that can be expected to fade over several months  Guttate psoriasis: When someone gets this type of psoriasis, you often see tiny bumps appear on the skin quite suddenly  The bumps tend to cover much of the torso, legs, and arms  Sometimes, the bumps also develop on the face, scalp, and ears  No matter where they appear, the bumps tend to be:   Small and scaly  [place]-colored to pink  Temporary, clearing in a few weeks or months without treatment  When guttate psoriasis clears, it may never return  Why this happens is still a bit of a mystery  Guttate psoriasis tends to develop in children and young adults who've had an infection, such as strep throat  It's possible that when the infection clears so does guttate psoriasis  It's also possible to have:  Guttate psoriasis for life  See the guttate psoriasis clear and plaque psoriasis develop later in life  Plaque psoriasis when you develop guttate psoriasis  There's no way to predict what will happen after the first flare-up of guttate psoriasis clears  Inverse psoriasis: This type of psoriasis develops in areas where skin touches skin, such as the armpits, genitals, and crease of the buttocks  Where the inverse psoriasis appears, you're likely to notice:  Smooth, red patches of skin that look raw  Little, if any, silvery-white coating  Sore or painful skin  Other names for this type of psoriasis are intertriginous psoriasis or flexural psoriasis  Pustular psoriasis: This type of psoriasis causes pus-filled bumps that usually appear only on the feet and hands   While the pus-filled bumps may look like an infection, the skin is not infected  The bumps don't contain bacteria or anything else that could cause an infection  Where pustular psoriasis appears, you tend to notice:  Red, swollen skin that is dotted with pus-filled bumps  Extremely sore or painful skin  Brown dots (and sometimes scale) appear as the pus-filled bumps dry  Pustular psoriasis can make just about any activity that requires your hands or feet, such as typing or walking, unbearably painful  Pustular psoriasis (generalized): Serious and life-threatening, this rare type of psoriasis causes pus-filled bumps to develop on much of the skin  Also called von Zumbusch psoriasis, a flare-up causes this sequence of events:  Skin on most of the body suddenly turns dry, red, and tender  Within hours, pus-filled bumps cover most of the skin  Often within a day, the pus-filled bumps break open and pools of pus leak onto the skin  As the pus dries (usually within 24 to 48 hours), the skin dries out and peels (as shown in this picture)  When the dried skin peels off, you see a smooth, glazed surface  In a few days or weeks, you may see a new crop of pus-filled bumps covering most of the skin, as the cycle repeats itself  Anyone with pustular psoriasis also feels very sick, and may develop a fever, headache, muscle weakness, and other symptoms  Medical care is often necessary to save the person's life  Erythrodermic psoriasis: Serious and life-threatening, this type of psoriasis requires immediate medical care  When someone develops erythrodermic psoriasis, you may notice:  Skin on most of the body looks burnt  Chills, fever, and the person looks extremely ill  Muscle weakness, a rapid pulse, and severe itch  The person may also be unable to keep warm, so hypothermia can set in quickly  Most people who develop this type of psoriasis already have another type of psoriasis   Before developing erythrodermic psoriasis, they often notice that their psoriasis is worsening or not improving with treatment  If you notice either of these happening, see a board-certified dermatologist     Nails    Nail psoriasis: With any type of psoriasis, you may see changes to your fingernails or toenails  About half of the people who have plaque psoriasis see signs of psoriasis on their fingernails at some point2  When psoriasis affects the nails, you may notice:  Tiny dents in your nails (called “nail pits”)  White, yellow, or brown discoloration under one or more nails  Crumbling, rough nails  A nail lifting up so that it's no longer attached  Buildup of skin cells beneath one or more nails, which lifts up the nail  Treatment and proper nail care can help you control nail psoriasis  Psoriatic arthritis: If you have psoriasis, it's important to pay attention to your joints  Some people who have psoriasis develop a type of arthritis called psoriatic arthritis  This is more likely to occur if you have severe psoriasis  Most people notice psoriasis on their skin years before they develop psoriatic arthritis  It's also possible to get psoriatic arthritis before psoriasis, but this is less common  When psoriatic arthritis develops, the signs can be subtle  At first, you may notice:  A swollen and tender joint, especially in a finger or toe  Heel pain  Swelling on the back of your leg, just above your heel  Stiffness in the morning that fades during the day  Like psoriasis, psoriatic arthritis cannot be cured  Treatment can prevent psoriatic arthritis from worsening, which is important  Allowed to progress, psoriatic arthritis can become disabling  Diagnosis and treatment of psoriasis   Psoriasis is usually diagnosed by clinical features, and skin biopsy if necessary  It is important to decrease factors that aggravate psoriasis   These include treating streptococcal infections, minimizing skin injuries, avoiding sun exposure if it exacerbates psoriasis, smoking, alcohol usage, decreasing stress, and maintaining an optimal body weight  Certain medications such as lithium, beta blockers, antimalarials, and NSAIDs have also been implicated  Suddenly stopping oral steroids or strong topical steroids can cause rebound disease  There are many categories of psoriasis treatments available  Topical therapy  Mild psoriasis is generally treated with topical agents alone  Which treatment is selected may depend on body site, extent and severity of psoriasis  Emollients  Coal tar preparations  Dithranol  Salicylic acid  Vitamin D analogue (calcipotriol)  Topical corticosteroids  Calcineurin inhibitor (tacrolimus, pimecrolimus)  Phototherapy  Most psoriasis centres offer phototherapy with ultraviolet (UV) radiation, often in combination with topical or systemic agents  Types of phototherapy include  Narrowband UVB  Broadband UVB  Photochemotherapy (PUVA)  Targeted phototherapy  Systemic therapy  Moderate to severe psoriasis warrants treatment with a systemic agent and/or phototherapy  The most common treatments are:  Methotrexate  Ciclosporin  Acitretin  Other medicines occasionally used for psoriasis include:  Mycophenolate  Apremilast  Hydroxyurea  Azathioprine  6-mercaptopurine  Systemic corticosteroids are best avoided due to a risk of severe withdrawal flare of psoriasis and adverse effects  Biologics or targeted therapies are reserved for conventional treatment-resistant severe psoriasis, mainly because of expense, as side effects compare favorably with other systemic agents  These include:  Anti-tumour necrosis factor-alpha antagonists (anti-TNF?) infliximab, adalimumab and etanercept  The interleukin (IL)-12/23 antagonist ustekinumab  IL-17 antagonists such as secukinumab  Many other monoclonal antibodies are under investigation in the treatment of psoriasis

## 2022-12-13 ENCOUNTER — OFFICE VISIT (OUTPATIENT)
Dept: FAMILY MEDICINE CLINIC | Facility: CLINIC | Age: 82
End: 2022-12-13

## 2022-12-13 VITALS
DIASTOLIC BLOOD PRESSURE: 62 MMHG | BODY MASS INDEX: 24.11 KG/M2 | TEMPERATURE: 97.9 F | OXYGEN SATURATION: 97 % | RESPIRATION RATE: 16 BRPM | SYSTOLIC BLOOD PRESSURE: 116 MMHG | WEIGHT: 153.6 LBS | HEART RATE: 87 BPM | HEIGHT: 67 IN

## 2022-12-13 DIAGNOSIS — M48.02 CERVICAL STENOSIS OF SPINAL CANAL: ICD-10-CM

## 2022-12-13 DIAGNOSIS — K59.00 CONSTIPATION, UNSPECIFIED CONSTIPATION TYPE: ICD-10-CM

## 2022-12-13 DIAGNOSIS — K21.9 GASTROESOPHAGEAL REFLUX DISEASE WITHOUT ESOPHAGITIS: Primary | Chronic | ICD-10-CM

## 2022-12-13 DIAGNOSIS — R10.84 GENERALIZED ABDOMINAL PAIN: ICD-10-CM

## 2022-12-13 RX ORDER — OMEPRAZOLE 40 MG/1
40 CAPSULE, DELAYED RELEASE ORAL
Qty: 30 CAPSULE | Refills: 5
Start: 2022-12-13

## 2022-12-13 RX ORDER — METHOCARBAMOL 500 MG/1
500 TABLET, FILM COATED ORAL
Qty: 30 TABLET | Refills: 0 | Status: SHIPPED | OUTPATIENT
Start: 2022-12-13

## 2022-12-13 NOTE — ASSESSMENT & PLAN NOTE
Unchanged  · Was switched to omeprazole 20 mg from Pepcid due to flareup of symptoms on 9/12/2022  · Patient did not do this and has continued to take Pepcid 20 mg nightly  · Patient is also complaining of abdominal pain which may be related to recurrent peptic ulcer which has had many years ago  · Start omeprazole 40 mg daily  · Referral placed for gastroenterology follow-up

## 2022-12-13 NOTE — ASSESSMENT & PLAN NOTE
Worsening  · Likely in the setting of stress regarding her 's ailments  · Bilateral trapezius and paraspinal muscles are spasmed and hypertonic on exam  · Negative Spurling and no tenderness to palpation of spinous processes of cervical spine  · No focal neurodeficits, weakness, or numbness  · Start Robaxin 500 mg nightly  · Continue heating pads, topical lidocaine/IcyHot  · Tylenol 3 times daily

## 2022-12-13 NOTE — ASSESSMENT & PLAN NOTE
Occurs mostly in the winter  · Bowel movements are every 3 days and are uncomfortable  · Patient has not tried any treatments yet  · Recommend starting fiber supplements daily  · Has had good results with senna and Colace in the past: Recommend restarting this    Patient declines prescription as she buys this over-the-counter  · Also encouraged adequate hydration of about 2 L of water daily

## 2022-12-13 NOTE — PATIENT INSTRUCTIONS
For constipation I recommend you start taking a daily fiber supplement  They make them in gummy forms or powder form  Start taking Senokot over-the-counter until you move your bowels on a regular basis then stop    Try to drink at least 64 ounces of water daily

## 2022-12-13 NOTE — ASSESSMENT & PLAN NOTE
New  · Unclear etiology  · Has not had colonoscopy in over 10 years  · Pain is diffuse but more present on exam in the lower quadrants  · Negative Silva sign or McBurney point, no rebound or guarding  · Symptoms either worsening reflux, peptic ulcer disease, constipation, irritable bowel syndrome  · Denies blood or mucus in the stool  · Had complete abdominal ultrasound 2016 which was unremarkable  · Referral to GI provided today

## 2022-12-13 NOTE — PROGRESS NOTES
Depression Screening and Follow-up Plan: Patient was screened for depression during today's encounter  They screened negative with a PHQ-2 score of 0  Assessment/Plan:      1  Gastroesophageal reflux disease without esophagitis  Assessment & Plan:  Unchanged  Was switched to omeprazole 20 mg from Pepcid due to flareup of symptoms on 9/12/2022  Patient did not do this and has continued to take Pepcid 20 mg nightly  Patient is also complaining of abdominal pain which may be related to recurrent peptic ulcer which has had many years ago  Start omeprazole 40 mg daily  Referral placed for gastroenterology follow-up    Orders:  -     Ambulatory Referral to Gastroenterology; Future  -     omeprazole (PriLOSEC) 40 MG capsule; Take 1 capsule (40 mg total) by mouth daily before breakfast    2  Cervical stenosis of spinal canal  Assessment & Plan:  Worsening  Likely in the setting of stress regarding her 's ailments  Bilateral trapezius and paraspinal muscles are spasmed and hypertonic on exam  Negative Spurling and no tenderness to palpation of spinous processes of cervical spine  No focal neurodeficits, weakness, or numbness  Start Robaxin 500 mg nightly  Continue heating pads, topical lidocaine/IcyHot  Tylenol 3 times daily    Orders:  -     methocarbamol (ROBAXIN) 500 mg tablet; Take 1 tablet (500 mg total) by mouth daily at bedtime as needed for muscle spasms    3  Generalized abdominal pain  Assessment & Plan:  New  Unclear etiology  Has not had colonoscopy in over 10 years  Pain is diffuse but more present on exam in the lower quadrants  Negative Silva sign or McBurney point, no rebound or guarding  Symptoms either worsening reflux, peptic ulcer disease, constipation, irritable bowel syndrome  Denies blood or mucus in the stool  Had complete abdominal ultrasound 2016 which was unremarkable  Referral to GI provided today    Orders:  -     Ambulatory Referral to Gastroenterology; Future    4  Constipation, unspecified constipation type  Assessment & Plan:  Occurs mostly in the winter  Bowel movements are every 3 days and are uncomfortable  Patient has not tried any treatments yet  Recommend starting fiber supplements daily  Has had good results with senna and Colace in the past: Recommend restarting this  Patient declines prescription as she buys this over-the-counter  Also encouraged adequate hydration of about 2 L of water daily    Orders:  -     Ambulatory Referral to Gastroenterology; Future            Subjective:      Patient ID: Deborah Nichols is a 79 y o  female presents today to discuss stomach issues  Patient reports waxing and waning aching abdominal pain  Eating will sometime improve pain but also make it worse at times  The pain is diffusely  She takes pepcid at bedtime  She is also constipated and has not moved bowel in 3 days  She take miralax on occasionaly  Denies nausea or vomiting  Had a colonoscopy >10 years  Right hip bursitis is worsening  Depends on weather  Has plans to go to ortho Dr Lavinia Trevino for [facility]  She has worsening neck pain  She sleeps with heating pad  She takes tylenol pm at night  HPI    The following portions of the patient's history were reviewed and updated as appropriate: allergies, current medications, past family history, past medical history, past social history, past surgical history and problem list     Review of Systems   Constitutional: Negative for activity change, chills, diaphoresis and fever  HENT: Negative for ear pain, hearing loss, postnasal drip, rhinorrhea, sinus pressure, sinus pain, sneezing and sore throat  Respiratory: Negative for cough, chest tightness, shortness of breath and wheezing  Cardiovascular: Negative for chest pain, palpitations and leg swelling  Gastrointestinal: Positive for abdominal pain and constipation   Negative for abdominal distention, anal bleeding, blood in stool, diarrhea, nausea, rectal pain and vomiting  Genitourinary: Negative for dysuria, frequency, hematuria and urgency  Musculoskeletal: Positive for neck pain and neck stiffness  Negative for arthralgias and myalgias  Neurological: Negative for dizziness, syncope, weakness, light-headedness, numbness and headaches  Psychiatric/Behavioral: The patient is nervous/anxious  Objective:      /62 (BP Location: Left arm, Patient Position: Sitting, Cuff Size: Adult)   Pulse 87   Temp 97 9 °F (36 6 °C)   Resp 16   Ht 5' 7" (1 702 m)   Wt 69 7 kg (153 lb 9 6 oz)   SpO2 97%   BMI 24 06 kg/m²          Physical Exam  Vitals reviewed  Constitutional:       General: She is not in acute distress  Appearance: She is well-developed  She is not diaphoretic  HENT:      Head: Normocephalic and atraumatic  Right Ear: External ear normal       Left Ear: External ear normal       Nose: Nose normal  No congestion  Mouth/Throat:      Mouth: Mucous membranes are moist       Pharynx: Oropharynx is clear  No oropharyngeal exudate  Eyes:      General: No scleral icterus  Right eye: No discharge  Left eye: No discharge  Conjunctiva/sclera: Conjunctivae normal       Pupils: Pupils are equal, round, and reactive to light  Neck:      Thyroid: No thyromegaly  Vascular: No JVD  Trachea: No tracheal deviation  Cardiovascular:      Rate and Rhythm: Normal rate and regular rhythm  Heart sounds: Normal heart sounds  No murmur heard  No friction rub  No gallop  Pulmonary:      Effort: Pulmonary effort is normal  No respiratory distress  Breath sounds: Normal breath sounds  No wheezing or rales  Chest:      Chest wall: No tenderness  Abdominal:      General: Bowel sounds are normal  There is no distension  Palpations: Abdomen is soft  Tenderness: There is abdominal tenderness in the right lower quadrant and left lower quadrant   There is no right CVA tenderness, left CVA tenderness, guarding or rebound  Musculoskeletal:      Cervical back: Neck supple  Spasms and tenderness present  No swelling, edema, deformity, erythema, signs of trauma, lacerations, rigidity, torticollis, bony tenderness or crepitus  No pain with movement  Decreased range of motion  Right lower leg: No edema  Left lower leg: No edema  Lymphadenopathy:      Cervical: No cervical adenopathy  Skin:     General: Skin is warm and dry  Neurological:      Mental Status: She is alert and oriented to person, place, and time  Mental status is at baseline  Psychiatric:         Mood and Affect: Mood normal          Behavior: Behavior normal          Thought Content:  Thought content normal          Judgment: Judgment normal

## 2023-03-15 ENCOUNTER — CONSULT (OUTPATIENT)
Dept: GASTROENTEROLOGY | Facility: CLINIC | Age: 83
End: 2023-03-15

## 2023-03-15 VITALS
WEIGHT: 152 LBS | SYSTOLIC BLOOD PRESSURE: 135 MMHG | DIASTOLIC BLOOD PRESSURE: 69 MMHG | HEIGHT: 67 IN | BODY MASS INDEX: 23.86 KG/M2 | TEMPERATURE: 97.6 F

## 2023-03-15 DIAGNOSIS — K59.00 CONSTIPATION, UNSPECIFIED CONSTIPATION TYPE: ICD-10-CM

## 2023-03-15 DIAGNOSIS — Z87.11 HISTORY OF GASTRIC ULCER: ICD-10-CM

## 2023-03-15 DIAGNOSIS — K92.1 MELENA: Primary | ICD-10-CM

## 2023-03-15 DIAGNOSIS — K21.9 GASTROESOPHAGEAL REFLUX DISEASE, UNSPECIFIED WHETHER ESOPHAGITIS PRESENT: ICD-10-CM

## 2023-03-15 DIAGNOSIS — K57.90 DIVERTICULOSIS: ICD-10-CM

## 2023-03-15 RX ORDER — DIPHENHYDRAMINE HCL 25 MG
25 TABLET ORAL EVERY 6 HOURS PRN
COMMUNITY

## 2023-03-15 RX ORDER — ZINC GLUCONATE 50 MG
50 TABLET ORAL DAILY
COMMUNITY

## 2023-03-15 RX ORDER — OMEGA-3S/DHA/EPA/FISH OIL/D3 300MG-1000
400 CAPSULE ORAL DAILY
COMMUNITY

## 2023-03-15 NOTE — PROGRESS NOTES
[facility] - Outpatient Consultation  Julianna Garcia 79 y o  female MRN: [id number]  Encounter: [id number]        ASSESSMENT AND PLAN     1  Melena  1 episode of charcoal colored black bowel movement last week  This has been followed by normal-appearing bowel movements  Denies any particular triggers, no recent changes in medications, unusual dietary intake around this episode  Denies any worsening of abdominal pain, nausea, vomiting, dizziness, exertional dyspnea  She self-reports a history of gastric ulcers around 40 years ago, self reports last EGD 5 to 6 years ago which was essentially normal   -Discussed that this could be multifactorial, low suspicion for active GI bleeding as she is stable, appears well, black stool has resolved  -We will obtain CBC, discussed that if the CBC shows any worsening of hemoglobin in that case we will recommend EGD however if the hemoglobin is stable would not recommend any other work-up at this time  - CBC and Platelet; Future    2  Gastroesophageal reflux disease, unspecified whether esophagitis present  Currently stable on Pepcid, will recommend to continue  Discussed that omeprazole has better efficacy for symptoms, she reports that she does not have any worsening or significant factors warranting change in medications as well as she is concerned about the side effects of PPI  Given this discussion we will continue Pepcid for now  3  History of gastric ulcer  Self-reported history around 40 years ago  4  Constipation, unspecified constipation type  Says that the constipation has been going on chronically, attributed to old age  Denies any acute worsening of symptoms, unintentional weight changes, bright red blood per rectum  Colonoscopy in 2017 showing diverticulosis  She uses as needed senna, prune juice and bisacodyl suppository very infrequently    Discussed that she should attempt to increase p o  hydration as well as increase fiber in the diet with assistance of over-the-counter supplements like Metamucil and Benefiber  She expressed understanding  5  Diverticulosis  As seen on colonoscopy in 2017  Follow-up based on the CBC results  Orders Placed This Encounter   Procedures   • CBC and Platelet     HISTORY OF PRESENT ILLNESS     HPI   Patient is a 79-year-old female with past medical history including but not limited to gastric ulcers, chronic GERD, chronic constipation who presents today upon referral from primary care provider due to 1 episode of dark black bowel movement which happened last week  Patient reports that she was in her usual state of health when she noticed 1 isolated dark charcoal colored black bowel movement last Tuesday  Following this she was alarmed as she has had something similar in the remote past and was told she had bleeding gastric ulcers which was around 40 years ago  Subsequently she reached out to her primary care provider who recommended she start taking omeprazole given her history of GERD however patient likes to avoid Prilosec medication as she is worried about the side effects and hence has not started taking it  She does report chronic reflux symptoms for which she has been taking Pepcid at bedtime for very long time, reports this controls her symptoms well  She also complains of intermittent constipation but attributes it to her old age, denies any acute worsening of the same  Reports occasional straining and hard stool almost every other day for which she uses as needed senna, prune juice which does help her  On inquiry she denies any worsening of abdominal pain, reflux symptoms, unintentional weight changes, bright red blood per rectum, nausea, vomiting, swallowing difficulty    She does recall that she has had an EGD in the past, self-reports EGD about 5 to 6 years ago, reports it was normal   She had a colonoscopy in 2017 which upon review showed significant diverticulosis but otherwise unremarkable, initially was recommended repeat in 5 years however upon discussion that after with her performing endoscopist decision was made that she does not want to pursue any more screening colonoscopy  Upon review of blood work her last hemoglobin check was in August 2022 which showed stable hemoglobin  She does report occasional NSAID use, reports nearly 6-10 occasions in the month when she would use Aleve for nonspecific pain  Denies any use of blood thinners  Denies any smoking or alcohol use aggressively  She does report a family history of gastric cancer in father who was diagnosed in [de-identified] as well as grandmother was diagnosed around same age  REVIEW OF SYSTEMS     CONSTITUTIONAL: Denies any fever, chills, rigors, and weight loss  HEENT: No earache or tinnitus  Denies hearing loss or visual disturbances  CARDIOVASCULAR: No chest pain or palpitations  RESPIRATORY: Denies any cough, hemoptysis, shortness of breath or dyspnea on exertion  GASTROINTESTINAL: As noted in the History of Present Illness  GENITOURINARY: No problems with urination  Denies any hematuria or dysuria  NEUROLOGIC: No dizziness or vertigo, denies headaches  MUSCULOSKELETAL: Denies any muscle or joint pain  SKIN: Denies skin rashes or itching  ENDOCRINE: Denies excessive thirst  Denies intolerance to heat or cold  PSYCHOSOCIAL: Denies depression or anxiety  Denies any recent memory loss         Historical information     Past Medical History:   Diagnosis Date   • Bladder polyps    • Breast cancer ([address] )     Left   • Cholelithiasis    • GERD (gastroesophageal reflux disease)    • Hematuria    • Psoriasis      Past Surgical History:   Procedure Laterality Date   • APPENDECTOMY     • BREAST SURGERY Left     Mastectomy   • CHOLECYSTECTOMY     • CYSTOSCOPY     • MASTECTOMY Left      Social History   Social History     Substance and Sexual Activity   Alcohol Use Yes    Comment: SOCIAL DRINKER     Social History Substance and Sexual Activity   Drug Use No     Social History     Tobacco Use   Smoking Status Never   Smokeless Tobacco Never   Tobacco Comments    no exposure to passive smoke     Family History   Problem Relation Age of Onset   • Lymphoma Mother    • Heart disease Father        Allergies       Current Outpatient Medications:   •  acetaminophen (TYLENOL) 100 mg/mL solution  •  Ascorbic Acid (VITAMIN C) 1000 MG tablet  •  B-Complex-C CAPS  •  cholecalciferol (VITAMIN D3) 400 units tablet  •  clobetasol (TEMOVATE) 0 05 % external solution  •  diphenhydrAMINE (BENADRYL) 25 mg tablet  •  famotidine (PEPCID) 20 mg tablet  •  zinc gluconate 50 mg tablet    Allergies   Allergen Reactions   • No Active Allergies            Objective assessment       Blood pressure 135/69, temperature 97 6 °F (36 4 °C), temperature source Tympanic, height 5' 7" (1 702 m), weight 68 9 kg (152 lb)  Body mass index is 23 81 kg/m²  PHYSICAL EXAM:         General Appearance:   Alert, cooperative, no distress   HEENT:   Normocephalic     Neck:  symmetrical   Lungs:   Breathing unlabored, able to speak in full sentences    Heart[de-identified]   Regular rate    Abdomen:   Non tender   Genitalia:   Deferred    Rectal:   Deferred    Extremities:  No cyanosis, or edema    Pulses:  Strong   Skin:  No jaundice, rashes, or lesions    Lymph nodes:  No palpable cervical lymphadenopathy        Lab Results:      No visits with results within 1 Day(s) from this visit     Latest known visit with results is:   Appointment on 08/31/2022   Component Date Value   • WBC 08/31/2022 5 61    • RBC 08/31/2022 3 82    • Hemoglobin 08/31/2022 11 6    • Hematocrit 08/31/2022 35 9    • MCV 08/31/2022 94    • MCH 08/31/2022 30 4    • MCHC 08/31/2022 32 3    • RDW 08/31/2022 13 9    • MPV 08/31/2022 9 5    • Platelets 57/72/6965 239    • nRBC 08/31/2022 0    • Neutrophils Relative 08/31/2022 45    • Immat GRANS % 08/31/2022 0    • Lymphocytes Relative 08/31/2022 31    • Monocytes Relative 08/31/2022 10    • Eosinophils Relative 08/31/2022 12 (H)    • Basophils Relative 08/31/2022 2 (H)    • Neutrophils Absolute 08/31/2022 2 49    • Immature Grans Absolute 08/31/2022 0 02    • Lymphocytes Absolute 08/31/2022 1 76    • Monocytes Absolute 08/31/2022 0 56    • Eosinophils Absolute 08/31/2022 0 67 (H)    • Basophils Absolute 08/31/2022 0 11 (H)    • Sodium 08/31/2022 140    • Potassium 08/31/2022 3 9    • Chloride 08/31/2022 110 (H)    • CO2 08/31/2022 27    • ANION GAP 08/31/2022 3 (L)    • BUN 08/31/2022 16    • Creatinine 08/31/2022 0 84    • Glucose, Fasting 08/31/2022 89    • Calcium 08/31/2022 9 1    • AST 08/31/2022 18    • ALT 08/31/2022 28    • Alkaline Phosphatase 08/31/2022 51    • Total Protein 08/31/2022 7 2    • Albumin 08/31/2022 3 5    • Total Bilirubin 08/31/2022 0 74    • eGFR 08/31/2022 64    • TSH 3RD GENERATON 08/31/2022 2 530    • SINDY 08/31/2022 Negative    • SS-A (RO) Ab 08/31/2022 <0 2    • SS-B (LA) Ab 08/31/2022 <0 2    • A/G Ratio 08/31/2022 1 46    • Albumin Electrophoresis 08/31/2022 59 4    • Albumin CONC 08/31/2022 4 22    • Alpha 1 08/31/2022 3 6    • ALPHA 1 CONC 08/31/2022 0 26    • Alpha 2 08/31/2022 8 9    • ALPHA 2 CONC 08/31/2022 0 63    • Beta-1 08/31/2022 5 9    • BETA 1 CONC 08/31/2022 0 42    • Beta-2 08/31/2022 5 8    • BETA 2 CONC 08/31/2022 0 41    • Gamma Globulin 08/31/2022 16 4    • GAMMA CONC 08/31/2022 1 16    • Total Protein 08/31/2022 7 1    • SPEP Interpretation 08/31/2022 See Comment          Radiology Results:      No results found        Verlee Sable, MD  [facility]

## 2023-03-17 ENCOUNTER — APPOINTMENT (OUTPATIENT)
Dept: LAB | Facility: CLINIC | Age: 83
End: 2023-03-17

## 2023-03-17 DIAGNOSIS — K92.1 MELENA: ICD-10-CM

## 2023-03-17 LAB
ERYTHROCYTE [DISTWIDTH] IN BLOOD BY AUTOMATED COUNT: 13.7 % (ref 11.6–15.1)
HCT VFR BLD AUTO: 35.3 % (ref 34.8–46.1)
HGB BLD-MCNC: 11.7 G/DL (ref 11.5–15.4)
MCH RBC QN AUTO: 31.4 PG (ref 26.8–34.3)
MCHC RBC AUTO-ENTMCNC: 33.1 G/DL (ref 31.4–37.4)
MCV RBC AUTO: 95 FL (ref 82–98)
PLATELET # BLD AUTO: 245 THOUSANDS/UL (ref 149–390)
PMV BLD AUTO: 10 FL (ref 8.9–12.7)
RBC # BLD AUTO: 3.73 MILLION/UL (ref 3.81–5.12)
WBC # BLD AUTO: 6.96 THOUSAND/UL (ref 4.31–10.16)

## 2023-03-27 ENCOUNTER — OFFICE VISIT (OUTPATIENT)
Dept: FAMILY MEDICINE CLINIC | Facility: CLINIC | Age: 83
End: 2023-03-27

## 2023-03-27 ENCOUNTER — TELEPHONE (OUTPATIENT)
Dept: GASTROENTEROLOGY | Facility: CLINIC | Age: 83
End: 2023-03-27

## 2023-03-27 VITALS
BODY MASS INDEX: 24.34 KG/M2 | SYSTOLIC BLOOD PRESSURE: 140 MMHG | WEIGHT: 155.4 LBS | DIASTOLIC BLOOD PRESSURE: 70 MMHG | OXYGEN SATURATION: 98 % | HEART RATE: 96 BPM | TEMPERATURE: 95.2 F

## 2023-03-27 DIAGNOSIS — K92.1 MELENA: ICD-10-CM

## 2023-03-27 DIAGNOSIS — M48.02 CERVICAL STENOSIS OF SPINAL CANAL: ICD-10-CM

## 2023-03-27 DIAGNOSIS — C50.919 MALIGNANT NEOPLASM OF FEMALE BREAST, UNSPECIFIED ESTROGEN RECEPTOR STATUS, UNSPECIFIED LATERALITY, UNSPECIFIED SITE OF BREAST (HCC): Primary | ICD-10-CM

## 2023-03-27 DIAGNOSIS — M25.551 HIP PAIN, BILATERAL: ICD-10-CM

## 2023-03-27 DIAGNOSIS — K21.9 GASTROESOPHAGEAL REFLUX DISEASE WITHOUT ESOPHAGITIS: Chronic | ICD-10-CM

## 2023-03-27 DIAGNOSIS — K59.00 CONSTIPATION, UNSPECIFIED CONSTIPATION TYPE: ICD-10-CM

## 2023-03-27 DIAGNOSIS — R63.4 UNINTENTIONAL WEIGHT LOSS: ICD-10-CM

## 2023-03-27 DIAGNOSIS — F43.22 ADJUSTMENT DISORDER WITH ANXIOUS MOOD: ICD-10-CM

## 2023-03-27 DIAGNOSIS — R31.29 MICROSCOPIC HEMATURIA: ICD-10-CM

## 2023-03-27 DIAGNOSIS — L40.9 PSORIASIS: ICD-10-CM

## 2023-03-27 DIAGNOSIS — M25.552 HIP PAIN, BILATERAL: ICD-10-CM

## 2023-03-27 PROBLEM — R07.9 CHEST PAIN: Status: RESOLVED | Noted: 2019-08-09 | Resolved: 2023-03-27

## 2023-03-27 NOTE — PROGRESS NOTES
Depression Screening and Follow-up Plan: Patient was screened for depression during today's encounter  They screened negative with a PHQ-2 score of 0  Assessment/Plan:      1  Malignant neoplasm of female breast, unspecified estrogen receptor status, unspecified laterality, unspecified site of breast [facility])  Assessment & Plan:  S/p radical mastectomy  Patient would like prescription for breast prosthesis  Order placed    Orders:  -     Nipple prosthesis, reusable, any type, each    2  Hip pain, bilateral  -     Ambulatory Referral to Physical Therapy; Future    3  Cervical stenosis of spinal canal  Assessment & Plan:  Waxes and wanes  Would like to try physical therapy  Referral placed  No longer on Robaxin  Continue heating pads and topical treatments    Orders:  -     Ambulatory Referral to Physical Therapy; Future    4  Gastroesophageal reflux disease without esophagitis  Assessment & Plan:  Had follow up with GI doctor  Is on pepcid BID PRN, no longer on omeprazole  Also had episode of melena which self resolved  Is to follow up with CBC and consider Colonoscopy if symptoms recur      5  Psoriasis  Assessment & Plan:  Follows with Derm and has UV light treatments twice weekly  6  Adjustment disorder with anxious mood  Assessment & Plan:  Waxes and wanes  In setting of  with progressive dementia      7  Constipation, unspecified constipation type  Assessment & Plan:  Current flare  Continues to have bowel movements every 3 days  Encouraged hydration and starting fiber supplements      8  Unintentional weight loss  Assessment & Plan:  Improved      9  Microscopic hematuria  Assessment & Plan:  Follows with urology  Normal cystoscopy  No need for further evaluation    Orders:  -     Comprehensive metabolic panel; Future; Expected date: 06/27/2023  -     UA w Reflex to Microscopic w Reflex to Culture -Lab Collect; Future; Expected date: 06/27/2023    10   Melena  -     CBC and differential; Future; Expected date: 06/27/2023            Subjective:      Patient ID: Shiva Mtz is a 79 y o  female presents today for physicaly therapy referral for chronic neck and hip pains  She was dressing her  and developed a pain in her left shoulder  Pain has improved quite a bit  HPI    The following portions of the patient's history were reviewed and updated as appropriate: allergies, current medications, past family history, past medical history, past social history, past surgical history and problem list     Review of Systems   Constitutional: Negative for activity change, chills, diaphoresis and fever  HENT: Negative for ear pain, hearing loss, postnasal drip, rhinorrhea, sinus pressure, sinus pain, sneezing and sore throat  Respiratory: Negative for cough, chest tightness, shortness of breath and wheezing  Cardiovascular: Negative for chest pain, palpitations and leg swelling  Gastrointestinal: Negative for abdominal pain, blood in stool, constipation, diarrhea, nausea and vomiting  Genitourinary: Negative for dysuria, frequency, hematuria and urgency  Musculoskeletal: Positive for arthralgias and neck pain  Negative for myalgias  Neurological: Negative for dizziness, syncope, weakness, light-headedness, numbness and headaches  Objective:      /70 (BP Location: Left arm, Patient Position: Sitting, Cuff Size: Adult)   Pulse 96   Temp (!) 95 2 °F (35 1 °C) (Temporal)   Wt 70 5 kg (155 lb 6 4 oz)   SpO2 98%   BMI 24 34 kg/m²          Physical Exam  Vitals reviewed  Constitutional:       General: She is not in acute distress  Appearance: She is well-developed  She is not diaphoretic  HENT:      Head: Normocephalic and atraumatic  Right Ear: External ear normal       Left Ear: External ear normal       Nose: Nose normal  No congestion  Mouth/Throat:      Mouth: Mucous membranes are moist       Pharynx: Oropharynx is clear  No oropharyngeal exudate  Eyes:      General: No scleral icterus  Right eye: No discharge  Left eye: No discharge  Conjunctiva/sclera: Conjunctivae normal       Pupils: Pupils are equal, round, and reactive to light  Neck:      Thyroid: No thyromegaly  Vascular: No JVD  Trachea: No tracheal deviation  Cardiovascular:      Rate and Rhythm: Normal rate and regular rhythm  Heart sounds: Normal heart sounds  No murmur heard  No friction rub  No gallop  Pulmonary:      Effort: Pulmonary effort is normal  No respiratory distress  Breath sounds: Normal breath sounds  No wheezing or rales  Chest:      Chest wall: No tenderness  Abdominal:      General: Bowel sounds are normal  There is no distension  Palpations: Abdomen is soft  Tenderness: There is no abdominal tenderness  There is no right CVA tenderness, left CVA tenderness, guarding or rebound  Musculoskeletal:         General: Normal range of motion  Cervical back: Normal range of motion and neck supple  Spasms present  No tenderness  Right lower leg: No edema  Left lower leg: No edema  Lymphadenopathy:      Cervical: No cervical adenopathy  Skin:     General: Skin is warm and dry  Neurological:      Mental Status: She is alert and oriented to person, place, and time  Mental status is at baseline  Psychiatric:         Mood and Affect: Mood normal          Behavior: Behavior normal          Thought Content:  Thought content normal          Judgment: Judgment normal

## 2023-03-27 NOTE — ASSESSMENT & PLAN NOTE
· Had follow up with GI doctor  · Is on pepcid BID PRN, no longer on omeprazole  · Also had episode of melena which self resolved  · Is to follow up with CBC and consider Colonoscopy if symptoms recur

## 2023-03-28 NOTE — ASSESSMENT & PLAN NOTE
Waxes and wanes  · Would like to try physical therapy  · Referral placed  · No longer on Robaxin  · Continue heating pads and topical treatments

## 2023-03-28 NOTE — ASSESSMENT & PLAN NOTE
Current flare  Continues to have bowel movements every 3 days  Encouraged hydration and starting fiber supplements

## 2023-03-29 ENCOUNTER — EVALUATION (OUTPATIENT)
Dept: PHYSICAL THERAPY | Age: 83
End: 2023-03-29

## 2023-03-29 DIAGNOSIS — M25.551 RIGHT HIP PAIN: Primary | ICD-10-CM

## 2023-03-29 NOTE — PROGRESS NOTES
PT Evaluation     Today's date: 3/29/2023  Patient name: Gal Bejarano  : 1940  MRN: [id number]  Referring provider: Lisa Carty, PT  Dx:   Encounter Diagnosis     ICD-10-CM    1  Right hip pain  M25 551                      Assessment  Assessment details: PT IE: 3/29/2023  Patient reported she has right hip pain that is present on the right lateral hip region  Patient noted sleep remains limited by pain aggravation  Patient noted she is also having left shoulder pain  Patient noted she continues to have lumbar spine pain that persists  Patient noted she has left shoulder pain is aggravated with self iadls, helping her  during dressing, showering, lying on left shoulder, house hold chores, reaching and lifting activities, undergarment dressing activities  Patient noted she has lumbar spine pain persists and the following functional activities are limited: lifting, carrying, house hold chores, prolonged sitting and standing / moving after prolonged sitting activities  Patient noted her right lateral hip pain persists: right side lying, sleep deprived, prolonged standing and prolonged sitting  Patient noted use of Alieve and Tylenol is pain reduction  Impairments: abnormal gait, abnormal or restricted ROM, abnormal movement, activity intolerance, impaired balance, impaired physical strength, lacks appropriate home exercise program and pain with function  Understanding of Dx/Px/POC: excellent   Prognosis: good  Prognosis details: Patient is a 79y o  year old female seen for outpatient PT evaluation with pain, mobility and functional deficits due to left shoulder pain, lumbar spine and right hip pain   Patient presents to PT IE with the following problems, concerns, deficits and impairments: left shoulder, lumbar spine and right hip pain, decreased lumbar spine range of motion, decreased bilateral lower and upper extremity mobility and strength, + TTP, + special tests, gait and stair dysfunctions, functional limitations and decreased tolerance to activity  Patient would benefit from skilled PT services under the following PT treatment plan to address the above noted deficits: therapeutic exercises and activities to facilitate left shoulder, lumbar spine and right hip mobility and strength, modalities, manual therapy techniques, gait and stair training, balance and proprioception activities, IASTM techniques, Kinesio taping techniques, and a hep  Thank you for the referral      Goals  Short Term goals 4 weeks  1  Patient will be independent HEP  2   Patient will report a 25 - 50% decrease in pain complaints  3   Increase strength 1/2 grade  4   Increase ROM 5-10 degrees  Long Term goals - 8 weeks  1  Patient will report elimination of pain complaints  2   Patient will return to all recreational activities without restriction  3   ROM WFL  4   Strength 5/5   5   Patient will report sleep improved by > 30 minutes  6   Patient will report walking is improved by > 15 minutes prior to right hip and lumbar spine pain aggravation  7   Patient will report stair climbing is without right hip and lumbar spine pain limitations  8   Patient will report ability to assist her  with his self iadls without left shoulder and right hip pain limitations  9   Patient will report house hold chores improved by > 10 minutes prior to left shoulder, lumbar spine and right hip pain      Plan  Patient would benefit from: skilled physical therapy  Planned modality interventions: cryotherapy, TENS, thermotherapy: hydrocollator packs, ultrasound, unattended electrical stimulation and traction  Planned therapy interventions: joint mobilization, manual therapy, massage, aquatic therapy, balance, balance/weight bearing training, neuromuscular re-education, patient education, body mechanics training, postural training, self care, compression, strengthening, stretching, therapeutic activities, therapeutic exercise, therapeutic training, flexibility, functional ROM exercises, transfer training, gait training, graded activity, graded exercise, graded motor and home exercise program  Frequency: 2x week  Duration in weeks: 8  Treatment plan discussed with: patient and family        Subjective Evaluation    History of Present Illness  Mechanism of injury: PMHx is remarkable for Breast Cancer with left Mastectomy, cervical spine stenosis, GERD and CP / Angina  Pain  At best pain ratin  At worst pain ratin  Location: Right Hip, lumbar spine and left shoulder    Patient Goals  Patient goals for therapy: decreased pain, increased motion, increased strength, independence with ADLs/IADLs and return to sport/leisure activities          Objective     Tenderness     Additional Tenderness Details  Patient is + moderate TTP at right lateral hip      Active Range of Motion   Left Shoulder   Flexion: 150 degrees with pain  Abduction: 100 degrees with pain    Right Shoulder   Flexion: 166 degrees   Abduction: 150 degrees     Lumbar   Flexion: 105 degrees   Extension: 20 degrees     Strength/Myotome Testing     Left Shoulder     Planes of Motion   Flexion: 4-   Abduction: 3+   External rotation at 0°: 4-   Internal rotation at 0°: 4     Right Shoulder     Planes of Motion   Flexion: 4+   Abduction: 4   External rotation at 0°: 4   Internal rotation at 0°: 5     Left Elbow   Flexion: 5  Extension: 5    Right Elbow   Flexion: 5  Extension: 5    Left Hip   Planes of Motion   Flexion: 4+  Extension: 4+  Abduction: 4+  Adduction: 5  External rotation: 4+  Internal rotation: 4+    Right Hip   Planes of Motion   Flexion: 4  Extension: 4+  Abduction: 4+  Adduction: 5  External rotation: 4-  Internal rotation: 4    Left Knee   Flexion: 4+  Extension: 4+    Right Knee   Flexion: 4+  Extension: 4    Left Ankle/Foot   Dorsiflexion: 4+  Plantar flexion: 5    Right Ankle/Foot   Dorsiflexion: 4+  Plantar flexion: 5    Tests     Lumbar     Left Negative passive SLR and slump test      Right   Positive passive SLR  Negative slump test      Left Pelvic Girdle/Sacrum   Negative: active SLR test      Right Pelvic Girdle/Sacrum   Positive: active SLR test      Right Hip   Positive HEENA, piriformis and scour  SLR: Positive  Ambulation     Ambulation: Level Surfaces   Ambulation without assistive device: independent    Additional Level Surfaces Ambulation Details  Patient ambulates with right lower extremity antalgic gait pattern  Ambulation: Stairs   Ascend stairs: independent  Pattern: reciprocal  Railings: one rail  Descend stairs: independent  Pattern: reciprocal  Railings: one rail             Precautions: : PMHx is remarkable for Breast Cancer with left Mastectomy, cervical spine stenosis, GERD and CP / Angina        Manuals 3/29            STM / IASTM to right lateral hip in supine lying             Lumbar spine extension mobilization in prone                                       Neuro Re-Ed                                                                                                        Ther Ex                          Treadmill walking or recumbent bike                          Standing back bends against counter or parallel bar                          LAQ:B:                          Supine hamstring stretch:B:             LTR:B:             Piriformis stretch:B:             DLS abdominal bracing in hook lying             DLS abdominal bracing in hook lying with hip flexion:B:             DLS abdominal bracing in hook lying with slr flexion:B:             DLS opposite upper extremity to lower extremity in hook lying             Bridges                          Prone lying 2 min            Prone on elbows 2 x 10 hep            Prone press ups                          [place] rows and extension:B:             [place] IR and ER:L             [place] sandy press             Mini squats             Lunges:B:             Shoulder scaption and flexion:B:                                       Ther Activity                                       Gait Training                                       Modalities             MHP to lumbar spine in prone or seated

## 2023-04-27 ENCOUNTER — APPOINTMENT (OUTPATIENT)
Dept: PHYSICAL THERAPY | Age: 83
End: 2023-04-27
Payer: MEDICARE

## 2023-04-27 DIAGNOSIS — M25.551 RIGHT HIP PAIN: Primary | ICD-10-CM

## 2023-05-02 ENCOUNTER — OFFICE VISIT (OUTPATIENT)
Dept: PHYSICAL THERAPY | Age: 83
End: 2023-05-02

## 2023-05-02 DIAGNOSIS — M54.16 LUMBAR RADICULOPATHY: ICD-10-CM

## 2023-05-02 DIAGNOSIS — M54.12 CERVICAL RADICULOPATHY: ICD-10-CM

## 2023-05-02 DIAGNOSIS — R42 DIZZINESS: Primary | ICD-10-CM

## 2023-05-02 NOTE — PROGRESS NOTES
"Daily Note     Today's date: 2023  Patient name: Dorian Elias  : 1940  MRN: [id number]  Referring provider: Suha Carty PT  Dx:   Encounter Diagnosis     ICD-10-CM    1  Dizziness  R42       2  Lumbar radiculopathy  M54 16       3  Cervical radiculopathy  M54 12                      Subjective: 3/10 pain at R hip /LB today \"my neck gets stiff\"         Objective: See treatment diary below      Assessment: Improved gait pattern after manual work and exercises  Progress as able       Plan: Cont with Plan of care      Precautions: : PMHx is remarkable for Breast Cancer with left Mastectomy, cervical spine stenosis, GERD and CP / Angina        Manuals 3/29 5/2           STM / Denise Maxon to right lateral hip in supine lying  10 min            Lumbar spine extension mobilization in prone                                       Neuro Re-Ed                                       Ther Ex                          Treadmill walking or recumbent bike  10 min                         Standing back bends against counter or parallel bar  NT                        LAQ:B:  20X 2SEC                         Supine hamstring stretch:B:  20SEC 5X            LTR:B:  20X 2SEC            Piriformis stretch:B:  NT           DLS abdominal bracing in hook lying  20X           DLS abdominal bracing in hook lying with hip flexion:B:  20X            DLS abdominal bracing in hook lying with slr flexion:B:  20X            DLS opposite upper extremity to lower extremity in hook lying  NT           Bridges  20X                         Prone lying 2 min 2 MIN           Prone on elbows 2 x 10 hep 2 MIN            Prone press ups  NT                        [place] rows and extension:B:  NT           [place] IR and ER:L  NT           [place] paloff press  NT           Mini squats  NT           Lunges:B:  NT           Shoulder scaption and flexion:B:  NT                                     Ther Activity                                       Gait " Training                                       Modalities             MHP to lumbar spine in prone or seated  NT

## 2023-05-04 ENCOUNTER — OFFICE VISIT (OUTPATIENT)
Dept: PHYSICAL THERAPY | Age: 83
End: 2023-05-04

## 2023-05-04 DIAGNOSIS — M54.16 LUMBAR RADICULOPATHY: Primary | ICD-10-CM

## 2023-05-04 DIAGNOSIS — R42 DIZZINESS: ICD-10-CM

## 2023-05-04 DIAGNOSIS — M54.12 CERVICAL RADICULOPATHY: ICD-10-CM

## 2023-05-04 NOTE — PROGRESS NOTES
Daily Note     Today's date: 2023  Patient name: Florinda Mcmullen  : 1940  MRN: [id number]  Referring provider: Teofilo Carty, PT  Dx:   Encounter Diagnosis     ICD-10-CM    1  Dizziness  R42       2  Lumbar radiculopathy  M54 16       3  Cervical radiculopathy  M54 12                      Subjective: Patient reported she had muscle soreness after the last PT visit  Patient noted right hip, lumbar spine and cervical spine pain at a 5 of 10  Objective: See treatment diary below      Assessment: Patient presents with right hip and lumbar spine pain resolved with prone lying as her directional preference and IASTM techniques  But, she lacks long term pain reduction that limits her standing based functional progress  Thus, PT is warranted to facilitate lumbar spine and right hip pain reduction and promoted bilateral lower extremity strength to promote full functional progress  Plan: Cont with Plan of care      Precautions: : PMHx is remarkable for Breast Cancer with left Mastectomy, cervical spine stenosis, GERD and CP / Angina        Manuals 3/29 5/2 5/4          BRANNON / Madelaine Guevara to right lateral hip in supine lying  10 min  10 min          Lumbar spine extension mobilization in prone                                       Neuro Re-Ed                                       Ther Ex                          Treadmill walking or recumbent bike  10 min  10 min                       Standing back bends against counter or parallel bar  NT 2 x 10                       LAQ:B:  20X 2SEC  3 sec x 20                       Supine hamstring stretch:B:  20SEC 5X  20 sec x 5          LTR:B:  20X 2SEC  10 sec x 5          Piriformis stretch:B:  NT 10 sec x 5          DLS abdominal bracing in hook lying  20X NT          DLS abdominal bracing in hook lying with hip flexion:B:  20X  2 x 10          DLS abdominal bracing in hook lying with slr flexion:B:  20X  2 x 10          DLS opposite upper extremity to lower extremity in hook lying  NT NT          Bridges  20X  2 x 10                       Prone lying 2 min 2 MIN 2 min          Prone on elbows 2 x 10 hep 2 MIN            Prone press ups  NT                        [place] rows and extension:B:  NT           [place] IR and ER:L  NT           Chintan paloff press  NT           Mini squats  NT           Lunges:B:  NT           Shoulder scaption and flexion:B:  NT                                     Ther Activity                                       Gait Training                                       Modalities             MHP to lumbar spine in prone or seated  NT

## 2023-05-08 ENCOUNTER — OFFICE VISIT (OUTPATIENT)
Dept: PHYSICAL THERAPY | Age: 83
End: 2023-05-08

## 2023-05-08 DIAGNOSIS — M54.12 CERVICAL RADICULOPATHY: ICD-10-CM

## 2023-05-08 DIAGNOSIS — R42 DIZZINESS: ICD-10-CM

## 2023-05-08 DIAGNOSIS — M54.16 LUMBAR RADICULOPATHY: Primary | ICD-10-CM

## 2023-05-08 NOTE — PROGRESS NOTES
"Daily Note     Today's date: 2023  Patient name: Crystal Long  : 1940  MRN: [id number]  Referring provider: Sonali Carty, PT  Dx:   Encounter Diagnosis     ICD-10-CM    1  Lumbar radiculopathy  M54 16       2  Cervical radiculopathy  M54 12       3  Dizziness  R42                      Subjective: Patient reported \"my R hip is very sore\"          Objective: See treatment diary below      Assessment: Decreased R hip/ LB pain with exercises and progression  Plan: Cont with Plan of care      Precautions: : PMHx is remarkable for Breast Cancer with left Mastectomy, cervical spine stenosis, GERD and CP / Angina        Manuals 3/29 5/2 5/4 5/8         STM / IASTM to right lateral hip in supine lying  10 min  10 min 10 min          Lumbar spine extension mobilization in prone                                       Neuro Re-Ed                                       Ther Ex                          Treadmill walking or recumbent bike  10 min  10 min 10 min                       Standing back bends against counter or parallel bar  NT 2 x 10 30x                      LAQ:B:  20X 2SEC  3 sec x 20 20x 3sec                       Supine hamstring stretch:B:  20SEC 5X  20 sec x 5 20sec 5x          LTR:B:  20X 2SEC  10 sec x 5 10x 10sec          Piriformis stretch:B:  NT 10 sec x 5 10x 10sec          DLS abdominal bracing in hook lying  20X NT 20x         DLS abdominal bracing in hook lying with hip flexion:B:  20X  2 x 10 20x         DLS abdominal bracing in hook lying with slr flexion:B:  20X  2 x 10 20x         DLS opposite upper extremity to lower extremity in hook lying  NT NT NT         Bridges  20X  2 x 10 20X 3SEC                       Prone lying 2 min 2 MIN 2 min NT         Prone on elbows 2 x 10 hep 2 MIN   NT         Prone press ups  NT  NT                      [place] rows and extension:B:  NT  NT         [place] IR and ER:L  NT  NT         [place] paloff press  NT  NT         Mini squats  NT  NT       " Lunges:B:  NT  NT         Shoulder scaption and flexion:B:  NT  NT                                   Ther Activity                                       Gait Training                                       Modalities             MHP to lumbar spine in prone or seated  NT  R hip   S/L     10 min

## 2023-05-11 ENCOUNTER — APPOINTMENT (OUTPATIENT)
Dept: PHYSICAL THERAPY | Age: 83
End: 2023-05-11
Payer: MEDICARE

## 2023-05-15 ENCOUNTER — APPOINTMENT (OUTPATIENT)
Dept: PHYSICAL THERAPY | Age: 83
End: 2023-05-15
Payer: MEDICARE

## 2023-05-18 ENCOUNTER — APPOINTMENT (OUTPATIENT)
Dept: PHYSICAL THERAPY | Age: 83
End: 2023-05-18
Payer: MEDICARE

## 2023-05-22 ENCOUNTER — APPOINTMENT (OUTPATIENT)
Dept: PHYSICAL THERAPY | Age: 83
End: 2023-05-22
Payer: MEDICARE

## 2023-05-25 ENCOUNTER — APPOINTMENT (OUTPATIENT)
Dept: PHYSICAL THERAPY | Age: 83
End: 2023-05-25
Payer: MEDICARE

## 2023-07-31 ENCOUNTER — OFFICE VISIT (OUTPATIENT)
Dept: FAMILY MEDICINE CLINIC | Facility: CLINIC | Age: 83
End: 2023-07-31
Payer: MEDICARE

## 2023-07-31 VITALS
OXYGEN SATURATION: 98 % | WEIGHT: 148.2 LBS | TEMPERATURE: 97.7 F | DIASTOLIC BLOOD PRESSURE: 75 MMHG | BODY MASS INDEX: 23.21 KG/M2 | SYSTOLIC BLOOD PRESSURE: 132 MMHG | HEART RATE: 72 BPM

## 2023-07-31 DIAGNOSIS — M48.02 CERVICAL STENOSIS OF SPINAL CANAL: ICD-10-CM

## 2023-07-31 DIAGNOSIS — M25.561 CHRONIC PAIN OF RIGHT KNEE: ICD-10-CM

## 2023-07-31 DIAGNOSIS — M25.551 PAIN OF RIGHT HIP: ICD-10-CM

## 2023-07-31 DIAGNOSIS — F33.9 DEPRESSION, RECURRENT (HCC): ICD-10-CM

## 2023-07-31 DIAGNOSIS — C50.919 MALIGNANT NEOPLASM OF FEMALE BREAST, UNSPECIFIED ESTROGEN RECEPTOR STATUS, UNSPECIFIED LATERALITY, UNSPECIFIED SITE OF BREAST (HCC): ICD-10-CM

## 2023-07-31 DIAGNOSIS — Z12.11 SCREENING FOR COLON CANCER: ICD-10-CM

## 2023-07-31 DIAGNOSIS — Z00.00 MEDICARE ANNUAL WELLNESS VISIT, SUBSEQUENT: Primary | ICD-10-CM

## 2023-07-31 DIAGNOSIS — Z12.31 ENCOUNTER FOR SCREENING MAMMOGRAM FOR MALIGNANT NEOPLASM OF BREAST: ICD-10-CM

## 2023-07-31 DIAGNOSIS — G89.29 CHRONIC PAIN OF RIGHT KNEE: ICD-10-CM

## 2023-07-31 PROCEDURE — 1123F ACP DISCUSS/DSCN MKR DOCD: CPT | Performed by: FAMILY MEDICINE

## 2023-07-31 PROCEDURE — 99214 OFFICE O/P EST MOD 30 MIN: CPT | Performed by: FAMILY MEDICINE

## 2023-07-31 PROCEDURE — G0444 DEPRESSION SCREEN ANNUAL: HCPCS | Performed by: FAMILY MEDICINE

## 2023-07-31 PROCEDURE — G0439 PPPS, SUBSEQ VISIT: HCPCS | Performed by: FAMILY MEDICINE

## 2023-07-31 RX ORDER — HYDROXYZINE HYDROCHLORIDE 10 MG/1
TABLET, FILM COATED ORAL
COMMUNITY

## 2023-07-31 RX ORDER — PERMETHRIN 50 MG/G
CREAM TOPICAL
COMMUNITY

## 2023-07-31 NOTE — ASSESSMENT & PLAN NOTE
S/p radical mastectomy  Patient requests refills on specialty bras  Due for screening mammogram today

## 2023-07-31 NOTE — ASSESSMENT & PLAN NOTE
Related to stress  · Did not finish PT due to caring for  with dementia  · Continue conservative treatment

## 2023-07-31 NOTE — PROGRESS NOTES
Assessment and Plan:     Problem List Items Addressed This Visit        Other    Malignant neoplasm of female breast [facility])     S/p radical mastectomy  Patient requests refills on specialty bras  Due for screening mammogram today         Relevant Orders    Mammo screening bilateral w cad    Cervical stenosis of spinal canal     Related to stress  Did not finish PT due to caring for  with dementia  Continue conservative treatment         Depression, recurrent ([address])     In the setting of primary care giver for  with dementia  Denies SI/HI         Relevant Medications    hydrOXYzine HCL (ATARAX) 10 mg tablet    Pain of right hip     Chronic  Tender in greater trochanter  Normal hip ROM  Patient likely has trochanteric bursitis  She has had CSI in the past without improvement. Now interested in bursectomy. Recommend patient schedule consult with orthopedics for further evaluation         Chronic pain of right knee     New  Previously had left knee pain and had xr which shoed mild arthritis  Right knee exam is unremarkable  Worse with overuse  Likely osteoarthritis  Offered CSI which patient declines  Continue to monitor        Other Visit Diagnoses     Medicare annual wellness visit, subsequent    -  Primary    Screening for colon cancer        Relevant Orders    Cologuard    Encounter for screening mammogram for malignant neoplasm of breast        Relevant Orders    Mammo screening bilateral w cad          Depression Screening and Follow-up Plan: Patient was screened for depression during today's encounter. They screened negative with a PHQ-2 score of 0. Preventive health issues were discussed with patient, and age appropriate screening tests were ordered as noted in patient's After Visit Summary. Personalized health advice and appropriate referrals for health education or preventive services given if needed, as noted in patient's After Visit Summary.      History of Present Illness:     Patient presents for a Medicare Wellness Visit and routine follow up. Her main c/o is right hip and knee pain. She was seen by pain and spine doctor who does not believe it is related to her hip. She has had multiple CSI in her trochanteric bursitis. HPI   Patient Care Team:  Fermin Ordonez MD as PCP - General  Stephanie Kebede PA-C     Review of Systems:     Review of Systems   Constitutional: Negative for activity change, chills, diaphoresis and fever. HENT: Negative for ear pain, hearing loss, postnasal drip, rhinorrhea, sinus pressure, sinus pain, sneezing and sore throat. Respiratory: Negative for cough, chest tightness, shortness of breath and wheezing. Cardiovascular: Negative for chest pain, palpitations and leg swelling. Gastrointestinal: Negative for abdominal pain, blood in stool, constipation, diarrhea, nausea and vomiting. Genitourinary: Negative for dysuria, frequency, hematuria and urgency. Musculoskeletal: Positive for arthralgias and neck pain. Negative for back pain, gait problem, joint swelling, myalgias and neck stiffness. Neurological: Negative for dizziness, syncope, weakness, light-headedness, numbness and headaches. Psychiatric/Behavioral: Positive for dysphoric mood.         Problem List:     Patient Active Problem List   Diagnosis   • Microscopic hematuria   • Increased frequency of urination   • Diverticulitis   • Screening cholesterol level   • Malignant neoplasm of female breast (HCC)   • Cervical stenosis of spinal canal   • Gastroesophageal reflux disease without esophagitis   • Psoriasis   • Adjustment disorder with anxious mood   • Constipation   • Generalized abdominal pain   • Depression, recurrent (HCC)   • Pain of right hip   • Chronic pain of right knee      Past Medical and Surgical History:     Past Medical History:   Diagnosis Date   • Bladder polyps    • Breast cancer ([address])     Left   • Cholelithiasis    • GERD (gastroesophageal reflux disease)    • Hematuria    • Psoriasis      Past Surgical History:   Procedure Laterality Date   • APPENDECTOMY     • BREAST SURGERY Left     Mastectomy   • CHOLECYSTECTOMY     • CYSTOSCOPY     • MASTECTOMY Left       Family History:     Family History   Problem Relation Age of Onset   • Lymphoma Mother    • Heart disease Father       Social History:     Social History     Socioeconomic History   • Marital status: /Civil Union     Spouse name: None   • Number of children: None   • Years of education: None   • Highest education level: None   Occupational History   • None   Tobacco Use   • Smoking status: Never   • Smokeless tobacco: Never   • Tobacco comments:     no exposure to passive smoke   Vaping Use   • Vaping Use: Never used   Substance and Sexual Activity   • Alcohol use: Yes     Comment: SOCIAL DRINKER   • Drug use: No   • Sexual activity: None   Other Topics Concern   • None   Social History Narrative   • None     Social Determinants of Health     Financial Resource Strain: Low Risk  (7/31/2023)    Overall Financial Resource Strain (CARDIA)    • Difficulty of Paying Living Expenses: Not hard at all   Food Insecurity: Not on file   Transportation Needs: No Transportation Needs (7/31/2023)    PRAPARE - Transportation    • Lack of Transportation (Medical): No    • Lack of Transportation (Non-Medical):  No   Physical Activity: Not on file   Stress: Not on file   Social Connections: Not on file   Intimate Partner Violence: Not on file   Housing Stability: Not on file      Medications and Allergies:     Current Outpatient Medications   Medication Sig Dispense Refill   • acetaminophen (TYLENOL) 100 mg/mL solution Take 10 mg/kg by mouth every 4 (four) hours as needed for fever     • Ascorbic Acid (VITAMIN C) 1000 MG tablet Take by mouth daily      • B-Complex-C CAPS Take by mouth daily      • cholecalciferol (VITAMIN D3) 400 units tablet Take 400 Units by mouth daily     • clobetasol (TEMOVATE) 0.05 % external solution APPLY TWO TIMES A DAY TO SCALP AS NEEDED     • famotidine (PEPCID) 20 mg tablet Take 20 mg by mouth daily       • zinc gluconate 50 mg tablet Take 50 mg by mouth daily     • diphenhydrAMINE (BENADRYL) 25 mg tablet Take 25 mg by mouth every 6 (six) hours as needed for itching 1/2 tab QHS (Patient not taking: Reported on 7/31/2023)     • hydrOXYzine HCL (ATARAX) 10 mg tablet TAKE ONE TO TWO TABLETS BY MOUTH BEFORE BEDTIME (Patient not taking: Reported on 7/31/2023)     • permethrin (ELIMITE) 5 % cream APPLY FROM THE NECK DOWN , LEAVE ON 8 TO 14 HOURS . THEN RINSE OFF . CAN REPEAT IN 1 WEEK IF NEEDED (Patient not taking: Reported on 7/31/2023)       No current facility-administered medications for this visit. Allergies   Allergen Reactions   • No Active Allergies       Immunizations:     Immunization History   Administered Date(s) Administered   • COVID-19 PFIZER VACCINE 0.3 ML IM 01/18/2021, 02/09/2021, 01/15/2022   • INFLUENZA 11/16/2005, 11/04/2016   • Influenza Split High Dose Preservative Free IM 11/04/2016   • Influenza, high dose seasonal 0.7 mL 11/16/2020   • Pneumococcal Conjugate 13-Valent 08/23/2018   • Pneumococcal Polysaccharide PPV23 12/20/2004   • Zoster 03/10/2011, 07/01/2013      Health Maintenance:         Topic Date Due   • Colorectal Cancer Screening  05/24/2022         Topic Date Due   • COVID-19 Vaccine (4 - Pfizer series) 03/12/2022   • Influenza Vaccine (1) 09/01/2023      Medicare Screening Tests and Risk Assessments:     Brenda Glodsmith is here for her Subsequent Wellness visit. Last Medicare Wellness visit information reviewed, patient interviewed and updates made to the record today. Health Risk Assessment:   Patient rates overall health as fair. Patient feels that their physical health rating is slightly worse. Patient is satisfied with their life. Eyesight was rated as same. Hearing was rated as same. Patient feels that their emotional and mental health rating is same.  Patients states they are never, rarely angry. Patient states they are never, rarely unusually tired/fatigued. Pain experienced in the last 7 days has been some. Patient's pain rating has been 6/10. Patient states that she has experienced no weight loss or gain in last 6 months. Depression Screening:   PHQ-2 Score: 0      Fall Risk Screening: In the past year, patient has experienced: no history of falling in past year      Urinary Incontinence Screening:   Patient has not leaked urine accidently in the last six months. Home Safety:  Patient does not have trouble with stairs inside or outside of their home. Patient has working smoke alarms and has working carbon monoxide detector. Home safety hazards include: none. Nutrition:   Current diet is Regular. Medications:   Patient is currently taking over-the-counter supplements. OTC medications include: see medication list. Patient is able to manage medications. Activities of Daily Living (ADLs)/Instrumental Activities of Daily Living (IADLs):   Walk and transfer into and out of bed and chair?: Yes  Dress and groom yourself?: Yes    Bathe or shower yourself?: Yes    Feed yourself? Yes  Do your laundry/housekeeping?: Yes  Manage your money, pay your bills and track your expenses?: Yes  Make your own meals?: Yes    Do your own shopping?: Yes    Previous Hospitalizations:   Any hospitalizations or ED visits within the last 12 months?: No      Advance Care Planning:   Living will: Yes    Durable POA for healthcare:  Yes    Advanced directive: Yes    Advanced directive counseling given: Yes    Five wishes given: No    End of Life Decisions reviewed with patient: Yes    Provider agrees with end of life decisions: Yes      Cognitive Screening:   Provider or family/friend/caregiver concerned regarding cognition?: No    PREVENTIVE SCREENINGS      Cardiovascular Screening:    General: Screening Current      Diabetes Screening:     General: Screening Current      Colorectal Cancer Screening: General: Screening Current      Breast Cancer Screening:     General: History Breast Cancer      Cervical Cancer Screening:    General: Screening Not Indicated      Osteoporosis Screening:    General: Screening Current      Abdominal Aortic Aneurysm (AAA) Screening:        General: Screening Not Indicated      Lung Cancer Screening:     General: Screening Not Indicated      Hepatitis C Screening:    General: Risks and Benefits Discussed    Screening, Brief Intervention, and Referral to Treatment (SBIRT)    Screening  Typical number of drinks in a day: 0  Typical number of drinks in a week: 1  Interpretation: Low risk drinking behavior. No results found. Physical Exam:     /75 (BP Location: Left arm, Patient Position: Sitting, Cuff Size: Adult)   Pulse 72   Temp 97.7 °F (36.5 °C) (Temporal)   Wt 67.2 kg (148 lb 3.2 oz)   SpO2 98%   BMI 23.21 kg/m²     Physical Exam  Constitutional:       General: She is not in acute distress. Appearance: She is well-developed. She is not diaphoretic. HENT:      Head: Normocephalic and atraumatic. Mouth/Throat:      Pharynx: No oropharyngeal exudate. Eyes:      Pupils: Pupils are equal, round, and reactive to light. Neck:      Vascular: No JVD. Cardiovascular:      Rate and Rhythm: Normal rate and regular rhythm. Heart sounds: Normal heart sounds. No murmur heard. No friction rub. No gallop. Pulmonary:      Effort: Pulmonary effort is normal. No respiratory distress. Breath sounds: Normal breath sounds. No wheezing or rales. Chest:      Chest wall: No tenderness. Abdominal:      General: Bowel sounds are normal. There is no distension. Palpations: Abdomen is soft. Tenderness: There is no abdominal tenderness. There is no guarding or rebound. Musculoskeletal:      Right hip: Tenderness and bony tenderness present. No deformity, lacerations or crepitus. Normal range of motion. Normal strength.       Left hip: Normal. Right upper leg: Normal.        Legs:    Lymphadenopathy:      Cervical: No cervical adenopathy. Skin:     General: Skin is warm and dry. Neurological:      Mental Status: She is alert and oriented to person, place, and time. Cranial Nerves: No cranial nerve deficit.    Psychiatric:         Behavior: Behavior normal.          [address] [place] [address],

## 2023-07-31 NOTE — ASSESSMENT & PLAN NOTE
New  · Previously had left knee pain and had xr which shoed mild arthritis  · Right knee exam is unremarkable  · Worse with overuse  · Likely osteoarthritis  · Offered CSI which patient declines  · Continue to monitor

## 2023-07-31 NOTE — ASSESSMENT & PLAN NOTE
Chronic  · Tender in greater trochanter  · Normal hip ROM  · Patient likely has trochanteric bursitis  · She has had CSI in the past without improvement. Now interested in bursectomy.   · Recommend patient schedule consult with orthopedics for further evaluation

## 2023-08-14 ENCOUNTER — APPOINTMENT (OUTPATIENT)
Dept: LAB | Facility: CLINIC | Age: 83
End: 2023-08-14
Payer: MEDICARE

## 2023-08-14 DIAGNOSIS — K92.1 MELENA: ICD-10-CM

## 2023-08-14 DIAGNOSIS — R31.29 MICROSCOPIC HEMATURIA: ICD-10-CM

## 2023-08-14 LAB
ALBUMIN SERPL BCP-MCNC: 3.6 G/DL (ref 3.5–5)
ALP SERPL-CCNC: 58 U/L (ref 46–116)
ALT SERPL W P-5'-P-CCNC: 32 U/L (ref 12–78)
ANION GAP SERPL CALCULATED.3IONS-SCNC: 4 MMOL/L
AST SERPL W P-5'-P-CCNC: 27 U/L (ref 5–45)
BASOPHILS # BLD AUTO: 0.14 THOUSANDS/ÂΜL (ref 0–0.1)
BASOPHILS NFR BLD AUTO: 2 % (ref 0–1)
BILIRUB SERPL-MCNC: 0.4 MG/DL (ref 0.2–1)
BILIRUB UR QL STRIP: NEGATIVE
BUN SERPL-MCNC: 18 MG/DL (ref 5–25)
CALCIUM SERPL-MCNC: 9.1 MG/DL (ref 8.3–10.1)
CHLORIDE SERPL-SCNC: 110 MMOL/L (ref 96–108)
CLARITY UR: CLEAR
CO2 SERPL-SCNC: 25 MMOL/L (ref 21–32)
COLOR UR: NORMAL
CREAT SERPL-MCNC: 0.82 MG/DL (ref 0.6–1.3)
EOSINOPHIL # BLD AUTO: 0.76 THOUSAND/ÂΜL (ref 0–0.61)
EOSINOPHIL NFR BLD AUTO: 10 % (ref 0–6)
ERYTHROCYTE [DISTWIDTH] IN BLOOD BY AUTOMATED COUNT: 14.5 % (ref 11.6–15.1)
GFR SERPL CREATININE-BSD FRML MDRD: 66 ML/MIN/1.73SQ M
GLUCOSE P FAST SERPL-MCNC: 87 MG/DL (ref 65–99)
GLUCOSE UR STRIP-MCNC: NEGATIVE MG/DL
HCT VFR BLD AUTO: 34.3 % (ref 34.8–46.1)
HGB BLD-MCNC: 10.8 G/DL (ref 11.5–15.4)
HGB UR QL STRIP.AUTO: NEGATIVE
IMM GRANULOCYTES # BLD AUTO: 0.01 THOUSAND/UL (ref 0–0.2)
IMM GRANULOCYTES NFR BLD AUTO: 0 % (ref 0–2)
KETONES UR STRIP-MCNC: NEGATIVE MG/DL
LEUKOCYTE ESTERASE UR QL STRIP: NEGATIVE
LYMPHOCYTES # BLD AUTO: 1.82 THOUSANDS/ÂΜL (ref 0.6–4.47)
LYMPHOCYTES NFR BLD AUTO: 24 % (ref 14–44)
MCH RBC QN AUTO: 30.6 PG (ref 26.8–34.3)
MCHC RBC AUTO-ENTMCNC: 31.5 G/DL (ref 31.4–37.4)
MCV RBC AUTO: 97 FL (ref 82–98)
MONOCYTES # BLD AUTO: 0.54 THOUSAND/ÂΜL (ref 0.17–1.22)
MONOCYTES NFR BLD AUTO: 7 % (ref 4–12)
NEUTROPHILS # BLD AUTO: 4.3 THOUSANDS/ÂΜL (ref 1.85–7.62)
NEUTS SEG NFR BLD AUTO: 57 % (ref 43–75)
NITRITE UR QL STRIP: NEGATIVE
NRBC BLD AUTO-RTO: 0 /100 WBCS
PH UR STRIP.AUTO: 6 [PH]
PLATELET # BLD AUTO: 198 THOUSANDS/UL (ref 149–390)
PMV BLD AUTO: 10.2 FL (ref 8.9–12.7)
POTASSIUM SERPL-SCNC: 4.2 MMOL/L (ref 3.5–5.3)
PROT SERPL-MCNC: 7 G/DL (ref 6.4–8.4)
PROT UR STRIP-MCNC: NEGATIVE MG/DL
RBC # BLD AUTO: 3.53 MILLION/UL (ref 3.81–5.12)
SODIUM SERPL-SCNC: 139 MMOL/L (ref 135–147)
SP GR UR STRIP.AUTO: 1.01 (ref 1–1.03)
UROBILINOGEN UR STRIP-ACNC: <2 MG/DL
WBC # BLD AUTO: 7.57 THOUSAND/UL (ref 4.31–10.16)

## 2023-08-14 PROCEDURE — 36415 COLL VENOUS BLD VENIPUNCTURE: CPT

## 2023-08-14 PROCEDURE — 85025 COMPLETE CBC W/AUTO DIFF WBC: CPT

## 2023-08-14 PROCEDURE — 80053 COMPREHEN METABOLIC PANEL: CPT

## 2023-08-14 PROCEDURE — 81003 URINALYSIS AUTO W/O SCOPE: CPT

## 2023-09-12 LAB — COLOGUARD RESULT REPORTABLE: NEGATIVE

## 2023-10-04 ENCOUNTER — OFFICE VISIT (OUTPATIENT)
Dept: FAMILY MEDICINE CLINIC | Facility: CLINIC | Age: 83
End: 2023-10-04
Payer: MEDICARE

## 2023-10-04 VITALS
TEMPERATURE: 97.6 F | OXYGEN SATURATION: 98 % | WEIGHT: 148 LBS | DIASTOLIC BLOOD PRESSURE: 52 MMHG | HEIGHT: 67 IN | BODY MASS INDEX: 23.23 KG/M2 | SYSTOLIC BLOOD PRESSURE: 96 MMHG | HEART RATE: 92 BPM

## 2023-10-04 DIAGNOSIS — D52.0 DIETARY FOLATE DEFICIENCY ANEMIA: ICD-10-CM

## 2023-10-04 DIAGNOSIS — D64.9 ANEMIA, UNSPECIFIED TYPE: ICD-10-CM

## 2023-10-04 DIAGNOSIS — L40.9 PSORIASIS: ICD-10-CM

## 2023-10-04 DIAGNOSIS — L20.89 OTHER ATOPIC DERMATITIS: ICD-10-CM

## 2023-10-04 DIAGNOSIS — R21 RASH: ICD-10-CM

## 2023-10-04 DIAGNOSIS — R53.83 OTHER FATIGUE: Primary | ICD-10-CM

## 2023-10-04 DIAGNOSIS — D53.9 NUTRITIONAL ANEMIA, UNSPECIFIED: ICD-10-CM

## 2023-10-04 PROCEDURE — 99214 OFFICE O/P EST MOD 30 MIN: CPT | Performed by: FAMILY MEDICINE

## 2023-10-04 NOTE — PROGRESS NOTES
Assessment/Plan:      1. Other fatigue  Assessment & Plan:  Chronic  Likely multifactorial  Patient is waking up 4 times at night to help  use the bathroom  Patient's  is newly diagnosed with Lewy body dementia and she is the primary caregiver   requires assistance with all ADLs and his dementia is progressing  Encouraged patient to look into respite care  Encouraged hydration  Follow-up TSH  Patient also found to be slightly anemic which requires further evaluation    Orders:  -     TSH, 3rd generation with Free T4 reflex; Future    2. Anemia, unspecified type  Assessment & Plan:  Lab Results   Component Value Date    WBC 7.57 08/14/2023    HGB 10.8 (L) 08/14/2023    HCT 34.3 (L) 08/14/2023    MCV 97 08/14/2023     08/14/2023       Slightly worsening  Unclear if iron deficient: Follow-up iron panel  We will also check B12 and folate  May be contributing to patient's fatigue      Orders:  -     Iron Panel (Includes Ferritin, Iron Sat%, Iron, and TIBC); Future  -     Vitamin B12; Future  -     Folate; Future    3. Psoriasis  Assessment & Plan:  Stable  Managed by dermatology  Continues UV light treatments twice weekly      4. Dietary folate deficiency anemia  Assessment & Plan:  Lab Results   Component Value Date    WBC 7.57 08/14/2023    HGB 10.8 (L) 08/14/2023    HCT 34.3 (L) 08/14/2023    MCV 97 08/14/2023     08/14/2023       Slightly worsening  Unclear if iron deficient: Follow-up iron panel  We will also check B12 and folate  May be contributing to patient's fatigue      Orders:  -     Vitamin B12; Future    5. Nutritional anemia, unspecified  -     Folate; Future    6. Rash  -     [facility][1] Allergy Panel, Adult; Future    7. Other atopic dermatitis  -     [facility][1] Allergy Panel, Adult; Future            Subjective:      Patient ID: Gregg Araujo is a 79 y.o. female presents today for acute visit.  She complains today of fatigue, hand pain, and to discuss blood work results. Denies bleeding  She has chronic hip pain and receives trochanteric bursa CSI injections by orthopedics. Reports rash that is pruritic. Remains on clobetasol solution by dermatology. Due to elevated basophils and eosinophils on CBC we will check a allergy panel  Patient also reports swelling of her third MCP on the left. Follows with hand surgery and will schedule appointment    HPI    The following portions of the patient's history were reviewed and updated as appropriate: allergies, current medications, past family history, past medical history, past social history, past surgical history and problem list.    Review of Systems   Constitutional: Positive for fatigue. Negative for activity change, chills, diaphoresis and fever. HENT: Negative for ear pain, hearing loss, postnasal drip, rhinorrhea, sinus pressure, sinus pain, sneezing and sore throat. Respiratory: Negative for cough, chest tightness, shortness of breath and wheezing. Cardiovascular: Negative for chest pain, palpitations and leg swelling. Gastrointestinal: Negative for abdominal pain, blood in stool, constipation, diarrhea, nausea and vomiting. Genitourinary: Negative for dysuria, frequency, hematuria and urgency. Musculoskeletal: Positive for arthralgias. Negative for myalgias. Neurological: Negative for dizziness, syncope, weakness, light-headedness, numbness and headaches. Psychiatric/Behavioral: The patient is nervous/anxious. Objective:      BP 96/52 (BP Location: Left arm, Patient Position: Sitting, Cuff Size: Adult)   Pulse 92   Temp 97.6 °F (36.4 °C) (Temporal)   Ht 5' 7" (1.702 m)   Wt 67.1 kg (148 lb)   SpO2 98%   BMI 23.18 kg/m²          Physical Exam  Vitals reviewed. Constitutional:       General: She is not in acute distress. Appearance: She is well-developed. She is not diaphoretic. HENT:      Head: Normocephalic and atraumatic.       Right Ear: External ear normal.      Left Ear: External ear normal.      Nose: Nose normal. No congestion. Mouth/Throat:      Mouth: Mucous membranes are moist.      Pharynx: Oropharynx is clear. No oropharyngeal exudate. Eyes:      General: No scleral icterus. Right eye: No discharge. Left eye: No discharge. Conjunctiva/sclera: Conjunctivae normal.      Pupils: Pupils are equal, round, and reactive to light. Neck:      Thyroid: No thyromegaly. Vascular: No JVD. Trachea: No tracheal deviation. Cardiovascular:      Rate and Rhythm: Normal rate and regular rhythm. Heart sounds: Normal heart sounds. No murmur heard. No friction rub. No gallop. Pulmonary:      Effort: Pulmonary effort is normal. No respiratory distress. Breath sounds: Normal breath sounds. No wheezing or rales. Chest:      Chest wall: No tenderness. Abdominal:      General: Bowel sounds are normal. There is no distension. Palpations: Abdomen is soft. Tenderness: There is no abdominal tenderness. There is no right CVA tenderness, left CVA tenderness, guarding or rebound. Musculoskeletal:         General: Normal range of motion. Cervical back: Normal range of motion and neck supple. No tenderness. Right lower leg: No edema. Left lower leg: No edema. Lymphadenopathy:      Cervical: No cervical adenopathy. Skin:     General: Skin is warm and dry. Neurological:      Mental Status: She is alert and oriented to person, place, and time. Mental status is at baseline. Psychiatric:         Mood and Affect: Mood normal.         Behavior: Behavior normal.         Thought Content:  Thought content normal.         Judgment: Judgment normal.

## 2023-10-04 NOTE — ASSESSMENT & PLAN NOTE
Chronic  · Likely multifactorial  · Patient is waking up 4 times at night to help  use the bathroom  · Patient's  is newly diagnosed with Lewy body dementia and she is the primary caregiver  ·  requires assistance with all ADLs and his dementia is progressing  · Encouraged patient to look into respite care  · Encouraged hydration  · Follow-up TSH  · Patient also found to be slightly anemic which requires further evaluation

## 2023-10-04 NOTE — ASSESSMENT & PLAN NOTE
Lab Results   Component Value Date    WBC 7.57 08/14/2023    HGB 10.8 (L) 08/14/2023    HCT 34.3 (L) 08/14/2023    MCV 97 08/14/2023     08/14/2023       Slightly worsening  Unclear if iron deficient: Follow-up iron panel  We will also check B12 and folate  May be contributing to patient's fatigue

## 2023-10-05 ENCOUNTER — HOSPITAL ENCOUNTER (OUTPATIENT)
Dept: RADIOLOGY | Age: 83
Discharge: HOME | End: 2023-10-05
Payer: MEDICARE

## 2023-10-05 DIAGNOSIS — Z12.31 ENCOUNTER FOR SCREENING MAMMOGRAM FOR MALIGNANT NEOPLASM OF BREAST: ICD-10-CM

## 2023-10-05 PROCEDURE — 77067 SCR MAMMO BI INCL CAD: CPT

## 2023-10-05 PROCEDURE — 77063 BREAST TOMOSYNTHESIS BI: CPT

## 2023-10-09 ENCOUNTER — APPOINTMENT (OUTPATIENT)
Dept: LAB | Facility: CLINIC | Age: 83
End: 2023-10-09
Payer: MEDICARE

## 2023-10-09 DIAGNOSIS — D64.9 ANEMIA, UNSPECIFIED TYPE: ICD-10-CM

## 2023-10-09 DIAGNOSIS — L20.89 OTHER ATOPIC DERMATITIS: ICD-10-CM

## 2023-10-09 DIAGNOSIS — R21 RASH: ICD-10-CM

## 2023-10-09 DIAGNOSIS — D52.0 DIETARY FOLATE DEFICIENCY ANEMIA: ICD-10-CM

## 2023-10-09 DIAGNOSIS — D53.9 NUTRITIONAL ANEMIA, UNSPECIFIED: ICD-10-CM

## 2023-10-09 DIAGNOSIS — R53.83 OTHER FATIGUE: ICD-10-CM

## 2023-10-09 LAB
FERRITIN SERPL-MCNC: 78 NG/ML (ref 11–307)
FOLATE SERPL-MCNC: 17.5 NG/ML
IRON SATN MFR SERPL: 20 % (ref 15–50)
IRON SERPL-MCNC: 57 UG/DL (ref 50–212)
TIBC SERPL-MCNC: 281 UG/DL (ref 250–450)
TSH SERPL DL<=0.05 MIU/L-ACNC: 1.9 UIU/ML (ref 0.45–4.5)
UIBC SERPL-MCNC: 224 UG/DL (ref 155–355)
VIT B12 SERPL-MCNC: 1150 PG/ML (ref 180–914)

## 2023-10-09 PROCEDURE — 36415 COLL VENOUS BLD VENIPUNCTURE: CPT

## 2023-10-09 PROCEDURE — 84443 ASSAY THYROID STIM HORMONE: CPT

## 2023-10-09 PROCEDURE — 82785 ASSAY OF IGE: CPT

## 2023-10-09 PROCEDURE — 83550 IRON BINDING TEST: CPT

## 2023-10-09 PROCEDURE — 86003 ALLG SPEC IGE CRUDE XTRC EA: CPT

## 2023-10-09 PROCEDURE — 82607 VITAMIN B-12: CPT

## 2023-10-09 PROCEDURE — 82746 ASSAY OF FOLIC ACID SERUM: CPT

## 2023-10-09 PROCEDURE — 83540 ASSAY OF IRON: CPT

## 2023-10-09 PROCEDURE — 82728 ASSAY OF FERRITIN: CPT

## 2023-10-11 LAB

## 2023-12-19 ENCOUNTER — OFFICE VISIT (OUTPATIENT)
Dept: FAMILY MEDICINE CLINIC | Facility: CLINIC | Age: 83
End: 2023-12-19
Payer: MEDICARE

## 2023-12-19 VITALS
WEIGHT: 148.8 LBS | OXYGEN SATURATION: 98 % | TEMPERATURE: 97.7 F | DIASTOLIC BLOOD PRESSURE: 76 MMHG | SYSTOLIC BLOOD PRESSURE: 128 MMHG | HEART RATE: 97 BPM | BODY MASS INDEX: 23.35 KG/M2 | HEIGHT: 67 IN

## 2023-12-19 DIAGNOSIS — J01.90 ACUTE NON-RECURRENT SINUSITIS, UNSPECIFIED LOCATION: Primary | ICD-10-CM

## 2023-12-19 PROCEDURE — 99213 OFFICE O/P EST LOW 20 MIN: CPT

## 2023-12-19 RX ORDER — AMOXICILLIN 500 MG/1
500 CAPSULE ORAL EVERY 12 HOURS SCHEDULED
Qty: 20 CAPSULE | Refills: 0 | Status: SHIPPED | OUTPATIENT
Start: 2023-12-19 | End: 2023-12-29

## 2023-12-19 NOTE — PROGRESS NOTES
Name: Anuja Mares      : 1940      MRN: [id number]  Encounter Provider: KEARA Borja  Encounter Date: 2023   Encounter department: [facility]    Assessment & Plan     1. Acute non-recurrent sinusitis, unspecified location  -     amoxicillin (AMOXIL) 500 mg capsule; Take 1 capsule (500 mg total) by mouth every 12 (twelve) hours for 10 days           Subjective      Cough  This is a new problem. The current episode started 1 to 4 weeks ago. The problem has been gradually worsening. The problem occurs every few hours. The cough is Productive of sputum. Associated symptoms include headaches, postnasal drip and rhinorrhea. Pertinent negatives include no chest pain, chills, ear pain, fever, rash, sore throat or shortness of breath. Nothing aggravates the symptoms. Treatments tried: honey, lemon, tea, robutussin. The treatment provided no relief.     Review of Systems   Constitutional:  Negative for activity change, chills, fatigue and fever.   HENT:  Positive for congestion, postnasal drip, rhinorrhea, sinus pressure, sinus pain and sneezing. Negative for ear pain, sore throat and trouble swallowing.    Eyes:  Negative for pain and visual disturbance.   Respiratory:  Positive for cough. Negative for chest tightness and shortness of breath.    Cardiovascular:  Negative for chest pain, palpitations and leg swelling.   Gastrointestinal:  Negative for abdominal pain, constipation, diarrhea, nausea and vomiting.   Genitourinary:  Negative for difficulty urinating, dysuria, hematuria and urgency.   Musculoskeletal:  Negative for arthralgias and back pain.   Skin:  Negative for color change and rash.   Neurological:  Positive for headaches. Negative for dizziness, seizures and syncope.   Psychiatric/Behavioral:  Negative for dysphoric mood. The patient is not nervous/anxious.    All other systems reviewed and are negative.      Current Outpatient Medications on File Prior  "to Visit   Medication Sig    acetaminophen (TYLENOL) 100 mg/mL solution Take 10 mg/kg by mouth every 4 (four) hours as needed for fever    Ascorbic Acid (VITAMIN C) 1000 MG tablet Take by mouth daily     B-Complex-C CAPS Take by mouth daily     cholecalciferol (VITAMIN D3) 400 units tablet Take 400 Units by mouth daily    clobetasol (TEMOVATE) 0.05 % external solution APPLY TWO TIMES A DAY TO SCALP AS NEEDED    famotidine (PEPCID) 20 mg tablet Take 20 mg by mouth daily      zinc gluconate 50 mg tablet Take 50 mg by mouth daily       Objective     /76 (BP Location: Left arm, Patient Position: Sitting, Cuff Size: Adult)   Pulse 97   Temp 97.7 °F (36.5 °C)   Ht 5' 7\" (1.702 m)   Wt 67.5 kg (148 lb 12.8 oz)   SpO2 98%   BMI 23.31 kg/m²     Physical Exam  Vitals and nursing note reviewed.   Constitutional:       Appearance: Normal appearance. She is normal weight.   HENT:      Head: Normocephalic.      Right Ear: Tympanic membrane, ear canal and external ear normal. There is no impacted cerumen.      Left Ear: Tympanic membrane, ear canal and external ear normal. There is no impacted cerumen.      Nose: Nose normal.      Mouth/Throat:      Mouth: Mucous membranes are moist.      Pharynx: Oropharynx is clear.   Eyes:      Extraocular Movements: Extraocular movements intact.      Conjunctiva/sclera: Conjunctivae normal.      Pupils: Pupils are equal, round, and reactive to light.   Cardiovascular:      Rate and Rhythm: Normal rate and regular rhythm.      Pulses: Normal pulses.      Heart sounds: Normal heart sounds.   Pulmonary:      Effort: Pulmonary effort is normal.      Breath sounds: Normal breath sounds.   Abdominal:      General: Bowel sounds are normal. There is no distension.      Palpations: Abdomen is soft.      Tenderness: There is no abdominal tenderness.   Musculoskeletal:         General: Normal range of motion.      Cervical back: Normal range of motion.   Skin:     General: Skin is warm.      " Capillary Refill: Capillary refill takes less than 2 seconds.   Neurological:      General: No focal deficit present.      Mental Status: She is alert and oriented to person, place, and time. Mental status is at baseline.   Psychiatric:         Mood and Affect: Mood normal.         Behavior: Behavior normal.         Thought Content: Thought content normal.         Judgment: Judgment normal.       Patient Instructions   Amoxicillin (By mouth)   Amoxicillin (g-[id number]-p-WHITNEY-in)  Treats infections or stomach ulcers. This medicine is a penicillin antibiotic.   Brand Name(s): Moxatag, Prevpac   There may be other brand names for this medicine.  When This Medicine Should Not Be Used:   This medicine is not right for everyone. You should not use it if you had an allergic reaction to amoxicillin, any type of penicillin, or a cephalosporin antibiotic.  How to Use This Medicine:   Capsule, Liquid, Tablet, Chewable Tablet, Long Acting Tablet  Your doctor will tell you how much medicine to use. Do not use more than directed.  Chewable tablet: You must chew the tablet before you swallow it. You may crush the tablet and mix the medicine with a small amount of food to make it easier to swallow.  Oral liquid: Shake well just before each use.  Measure the oral liquid medicine with a marked measuring spoon, oral syringe, or medicine cup. You may mix the oral liquid with a baby formula, milk, fruit juice, water, ginger ale, or another cold drink. Be sure your child drinks all of the mixture right away.  Tablet for suspension: Place the tablet in a small drinking glass, and add 2 teaspoons of water. Do not use any other liquid. Gently stir or swirl the water in the glass until the tablet is completely dissolved. Drink all of this mixture right away. Add more water to the glass and drink all of it to make sure you get all of the medicine. Do not chew or swallow the tablet for suspension.  Take all of the medicine in your prescription to  clear up your infection, even if you feel better after the first few doses.  Take a dose as soon as you remember. If it is almost time for your next dose, wait until then and take a regular dose. Do not take extra medicine to make up for a missed dose.  Store the tablets, capsules, and tablets for suspension at room temperature, away from heat, moisture, and direct light.  Store the oral liquid in the refrigerator. Do not freeze. Throw away any unused medicine after 14 days.  Drugs and Foods to Avoid:   Ask your doctor or pharmacist before using any other medicine, including over-the-counter medicines, vitamins, and herbal products.  Some medicines can affect how amoxicillin works. Tell your doctor if you are also using any of the following:   Allopurinol  Probenecid  Birth control pills  A blood thinner  Warnings While Using This Medicine:   Tell your doctor if you are pregnant or breastfeeding, or if you have kidney disease, allergies, or a condition called phenylketonuria (PKU). Tell your doctor if you are on dialysis.  This medicine can cause diarrhea. Call your doctor if the diarrhea becomes severe, does not stop, or is bloody. Do not take any medicine to stop diarrhea until you have talked to your doctor. Diarrhea can occur 2 months or more after you stop taking this medicine.  Tell any doctor or dentist who treats you that you are using this medicine. This medicine may affect certain medical test results.  Call your doctor if your symptoms do not improve or if they get worse.  Use this medicine to treat only the infection your doctor has prescribed it for. Do not use this medicine for any infection or condition that has not been checked by a doctor. This medicine will not treat the flu or the common cold.  Keep all medicine out of the reach of children. Never share your medicine with anyone.  Possible Side Effects While Using This Medicine:   Call your doctor right away if you notice any of these side  effects:  Allergic reaction: Itching or hives, swelling in your face or hands, swelling or tingling in your mouth or throat, chest tightness, trouble breathing  Blistering, peeling, or red skin rash  Diarrhea that may contain blood, stomach cramps, fever  If you notice these less serious side effects, talk with your doctor:   Mild diarrhea, nausea, or vomiting  Mild skin rash  If you notice other side effects that you think are caused by this medicine, tell your doctor.   Call your doctor for medical advice about side effects. You may report side effects to FDA at 6-892-AVT-9603  © Copyright [employer] 2023 Information is for End User's use only and may not be sold, redistributed or otherwise used for commercial purposes.  The above information is an  only. It is not intended as medical advice for individual conditions or treatments. Talk to your doctor, nurse or pharmacist before following any medical regimen to see if it is safe and effective for you.  Sinusitis   WHAT YOU NEED TO KNOW:   Sinusitis is inflammation or infection of your sinuses. Sinusitis is most often caused by a virus. Acute sinusitis may last up to 12 weeks. Chronic sinusitis lasts longer than 12 weeks. Recurrent sinusitis means you have 4 or more infections in 1 year.        DISCHARGE INSTRUCTIONS:   Return to the emergency department if:   You have trouble breathing or wheezing that is getting worse.    You have a stiff neck, a fever, or a bad headache.     You cannot open your eye.     Your eyeball bulges out or you cannot move your eye.     You are more sleepy than normal, or you notice changes in your ability to think, move, or talk.    You have swelling of your forehead or scalp.    Call your doctor if:   You have vision changes, such as double vision.    Your eye and eyelid are red, swollen, and painful.     Your symptoms do not improve or go away after 10 days.    You have nausea and are vomiting.    Your nose is  bleeding.    You have questions or concerns about your condition or care.    Medicines:  Your symptoms may go away on their own. Your healthcare provider may recommend watchful waiting for up to 10 days before starting antibiotics. You may need any of the following:  Acetaminophen  decreases pain and fever. It is available without a doctor's order. Ask how much to take and how often to take it. Follow directions. Read the labels of all other medicines you are using to see if they also contain acetaminophen, or ask your doctor or pharmacist. Acetaminophen can cause liver damage if not taken correctly.    NSAIDs , such as ibuprofen, help decrease swelling, pain, and fever. This medicine is available with or without a doctor's order. NSAIDs can cause stomach bleeding or kidney problems in certain people. If you take blood thinner medicine, always ask your healthcare provider if NSAIDs are safe for you. Always read the medicine label and follow directions.    Nasal steroid sprays  may help decrease inflammation in your nose and sinuses.    Decongestants  help reduce swelling and drain mucus in the nose and sinuses. They may help you breathe easier.     Antihistamines  help dry mucus in the nose and relieve sneezing.     Antibiotics  help treat or prevent a bacterial infection.    Take your medicine as directed.  Contact your healthcare provider if you think your medicine is not helping or if you have side effects. Tell your provider if you are allergic to any medicine. Keep a list of the medicines, vitamins, and herbs you take. Include the amounts, and when and why you take them. Bring the list or the pill bottles to follow-up visits. Carry your medicine list with you in case of an emergency.    Self-care:   Rinse your sinuses as directed.  Use a sinus rinse device to rinse your nasal passages with a saline (salt water) solution or distilled water. Do not use tap water. This will help thin the mucus in your nose and  rinse away pollen and dirt. It will also help reduce swelling so you can breathe normally.    Use a humidifier  to increase air moisture in your home. This may make it easier for you to breathe and help decrease your cough.     Sleep with your head elevated.  Place an extra pillow under your head before you go to sleep to help your sinuses drain.     Drink liquids as directed.  Ask your healthcare provider how much liquid to drink each day and which liquids are best for you. Liquids will thin the mucus in your nose and help it drain. Avoid drinks that contain alcohol or caffeine.     Do not smoke, and avoid secondhand smoke.  Nicotine and other chemicals in cigarettes and cigars can make your symptoms worse. Ask your healthcare provider for information if you currently smoke and need help to quit. E-cigarettes or smokeless tobacco still contain nicotine. Talk to your healthcare provider before you use these products.    Prevent the spread of germs:   Wash your hands often with soap and water.  Wash your hands after you use the bathroom, change a child's diaper, or sneeze. Wash your hands before you prepare or eat food.         Stay away from people who are sick.  Some germs spread easily and quickly through contact.    Follow up with your doctor as directed:  You may be referred to an ear, nose, and throat specialist. Write down your questions so you remember to ask them during your visits.   © Copyright [employer] 2023 Information is for End User's use only and may not be sold, redistributed or otherwise used for commercial purposes.  The above information is an  only. It is not intended as medical advice for individual conditions or treatments. Talk to your doctor, nurse or pharmacist before following any medical regimen to see if it is safe and effective for you.     KEARA Borja

## 2023-12-19 NOTE — PATIENT INSTRUCTIONS
Amoxicillin (By mouth)   Amoxicillin (j-hgz-h-WHITNEY-in)  Treats infections or stomach ulcers. This medicine is a penicillin antibiotic.   Brand Name(s): Moxatagene Prevpac   There may be other brand names for this medicine.  When This Medicine Should Not Be Used:   This medicine is not right for everyone. You should not use it if you had an allergic reaction to amoxicillin, any type of penicillin, or a cephalosporin antibiotic.  How to Use This Medicine:   Capsule, Liquid, Tablet, Chewable Tablet, Long Acting Tablet  Your doctor will tell you how much medicine to use. Do not use more than directed.  Chewable tablet: You must chew the tablet before you swallow it. You may crush the tablet and mix the medicine with a small amount of food to make it easier to swallow.  Oral liquid: Shake well just before each use.  Measure the oral liquid medicine with a marked measuring spoon, oral syringe, or medicine cup. You may mix the oral liquid with a baby formula, milk, fruit juice, water, ginger ale, or another cold drink. Be sure your child drinks all of the mixture right away.  Tablet for suspension: Place the tablet in a small drinking glass, and add 2 teaspoons of water. Do not use any other liquid. Gently stir or swirl the water in the glass until the tablet is completely dissolved. Drink all of this mixture right away. Add more water to the glass and drink all of it to make sure you get all of the medicine. Do not chew or swallow the tablet for suspension.  Take all of the medicine in your prescription to clear up your infection, even if you feel better after the first few doses.  Take a dose as soon as you remember. If it is almost time for your next dose, wait until then and take a regular dose. Do not take extra medicine to make up for a missed dose.  Store the tablets, capsules, and tablets for suspension at room temperature, away from heat, moisture, and direct light.  Store the oral liquid in the refrigerator. Do not  freeze. Throw away any unused medicine after 14 days.  Drugs and Foods to Avoid:   Ask your doctor or pharmacist before using any other medicine, including over-the-counter medicines, vitamins, and herbal products.  Some medicines can affect how amoxicillin works. Tell your doctor if you are also using any of the following:   Allopurinol  Probenecid  Birth control pills  A blood thinner  Warnings While Using This Medicine:   Tell your doctor if you are pregnant or breastfeeding, or if you have kidney disease, allergies, or a condition called phenylketonuria (PKU). Tell your doctor if you are on dialysis.  This medicine can cause diarrhea. Call your doctor if the diarrhea becomes severe, does not stop, or is bloody. Do not take any medicine to stop diarrhea until you have talked to your doctor. Diarrhea can occur 2 months or more after you stop taking this medicine.  Tell any doctor or dentist who treats you that you are using this medicine. This medicine may affect certain medical test results.  Call your doctor if your symptoms do not improve or if they get worse.  Use this medicine to treat only the infection your doctor has prescribed it for. Do not use this medicine for any infection or condition that has not been checked by a doctor. This medicine will not treat the flu or the common cold.  Keep all medicine out of the reach of children. Never share your medicine with anyone.  Possible Side Effects While Using This Medicine:   Call your doctor right away if you notice any of these side effects:  Allergic reaction: Itching or hives, swelling in your face or hands, swelling or tingling in your mouth or throat, chest tightness, trouble breathing  Blistering, peeling, or red skin rash  Diarrhea that may contain blood, stomach cramps, fever  If you notice these less serious side effects, talk with your doctor:   Mild diarrhea, nausea, or vomiting  Mild skin rash  If you notice other side effects that you think are  caused by this medicine, tell your doctor.   Call your doctor for medical advice about side effects. You may report side effects to FDA at 7-985-JBX-5491  © Copyright [employer] 2023 Information is for End User's use only and may not be sold, redistributed or otherwise used for commercial purposes.  The above information is an  only. It is not intended as medical advice for individual conditions or treatments. Talk to your doctor, nurse or pharmacist before following any medical regimen to see if it is safe and effective for you.  Sinusitis   WHAT YOU NEED TO KNOW:   Sinusitis is inflammation or infection of your sinuses. Sinusitis is most often caused by a virus. Acute sinusitis may last up to 12 weeks. Chronic sinusitis lasts longer than 12 weeks. Recurrent sinusitis means you have 4 or more infections in 1 year.        DISCHARGE INSTRUCTIONS:   Return to the emergency department if:   You have trouble breathing or wheezing that is getting worse.    You have a stiff neck, a fever, or a bad headache.     You cannot open your eye.     Your eyeball bulges out or you cannot move your eye.     You are more sleepy than normal, or you notice changes in your ability to think, move, or talk.    You have swelling of your forehead or scalp.    Call your doctor if:   You have vision changes, such as double vision.    Your eye and eyelid are red, swollen, and painful.     Your symptoms do not improve or go away after 10 days.    You have nausea and are vomiting.    Your nose is bleeding.    You have questions or concerns about your condition or care.    Medicines:  Your symptoms may go away on their own. Your healthcare provider may recommend watchful waiting for up to 10 days before starting antibiotics. You may need any of the following:  Acetaminophen  decreases pain and fever. It is available without a doctor's order. Ask how much to take and how often to take it. Follow directions. Read the labels of all  other medicines you are using to see if they also contain acetaminophen, or ask your doctor or pharmacist. Acetaminophen can cause liver damage if not taken correctly.    NSAIDs , such as ibuprofen, help decrease swelling, pain, and fever. This medicine is available with or without a doctor's order. NSAIDs can cause stomach bleeding or kidney problems in certain people. If you take blood thinner medicine, always ask your healthcare provider if NSAIDs are safe for you. Always read the medicine label and follow directions.    Nasal steroid sprays  may help decrease inflammation in your nose and sinuses.    Decongestants  help reduce swelling and drain mucus in the nose and sinuses. They may help you breathe easier.     Antihistamines  help dry mucus in the nose and relieve sneezing.     Antibiotics  help treat or prevent a bacterial infection.    Take your medicine as directed.  Contact your healthcare provider if you think your medicine is not helping or if you have side effects. Tell your provider if you are allergic to any medicine. Keep a list of the medicines, vitamins, and herbs you take. Include the amounts, and when and why you take them. Bring the list or the pill bottles to follow-up visits. Carry your medicine list with you in case of an emergency.    Self-care:   Rinse your sinuses as directed.  Use a sinus rinse device to rinse your nasal passages with a saline (salt water) solution or distilled water. Do not use tap water. This will help thin the mucus in your nose and rinse away pollen and dirt. It will also help reduce swelling so you can breathe normally.    Use a humidifier  to increase air moisture in your home. This may make it easier for you to breathe and help decrease your cough.     Sleep with your head elevated.  Place an extra pillow under your head before you go to sleep to help your sinuses drain.     Drink liquids as directed.  Ask your healthcare provider how much liquid to drink each day  and which liquids are best for you. Liquids will thin the mucus in your nose and help it drain. Avoid drinks that contain alcohol or caffeine.     Do not smoke, and avoid secondhand smoke.  Nicotine and other chemicals in cigarettes and cigars can make your symptoms worse. Ask your healthcare provider for information if you currently smoke and need help to quit. E-cigarettes or smokeless tobacco still contain nicotine. Talk to your healthcare provider before you use these products.    Prevent the spread of germs:   Wash your hands often with soap and water.  Wash your hands after you use the bathroom, change a child's diaper, or sneeze. Wash your hands before you prepare or eat food.         Stay away from people who are sick.  Some germs spread easily and quickly through contact.    Follow up with your doctor as directed:  You may be referred to an ear, nose, and throat specialist. Write down your questions so you remember to ask them during your visits.   © Copyright [employer] 2023 Information is for End User's use only and may not be sold, redistributed or otherwise used for commercial purposes.  The above information is an  only. It is not intended as medical advice for individual conditions or treatments. Talk to your doctor, nurse or pharmacist before following any medical regimen to see if it is safe and effective for you.

## 2024-01-05 ENCOUNTER — TELEMEDICINE (OUTPATIENT)
Dept: FAMILY MEDICINE CLINIC | Facility: CLINIC | Age: 84
End: 2024-01-05
Payer: MEDICARE

## 2024-01-05 DIAGNOSIS — J11.1 FLU: Primary | ICD-10-CM

## 2024-01-05 PROCEDURE — 99442 PR PHYS/QHP TELEPHONE EVALUATION 11-20 MIN: CPT | Performed by: FAMILY MEDICINE

## 2024-01-05 RX ORDER — GUAIFENESIN 600 MG/1
1200 TABLET, EXTENDED RELEASE ORAL EVERY 12 HOURS SCHEDULED
Qty: 60 TABLET | Refills: 0 | Status: SHIPPED | OUTPATIENT
Start: 2024-01-05

## 2024-01-05 RX ORDER — OSELTAMIVIR PHOSPHATE 75 MG/1
75 CAPSULE ORAL EVERY 12 HOURS SCHEDULED
Qty: 10 CAPSULE | Refills: 0 | Status: SHIPPED | OUTPATIENT
Start: 2024-01-05 | End: 2024-01-10

## 2024-01-05 NOTE — ASSESSMENT & PLAN NOTE
New  Onset 2 days ago  Negative for coivd 19 x2  Symptoms are consistent with flu  Recommend tamiflu, patient agreeable  Continue symptomatic treatment with OTC medications  Discussed return precautions  Follow up as needed

## 2024-01-05 NOTE — PROGRESS NOTES
Virtual Regular Visit    Verification of patient location:    Patient is located at Home in the following state in which I hold an active license [place]      Assessment/Plan:    Problem List Items Addressed This Visit        Respiratory    Flu - Primary     New  Onset 2 days ago  Negative for coivd 19 x2  Symptoms are consistent with flu  Recommend tamiflu, patient agreeable  Continue symptomatic treatment with OTC medications  Discussed return precautions  Follow up as needed         Relevant Medications    oseltamivir (TAMIFLU) 75 mg capsule    guaiFENesin (MUCINEX) 600 mg 12 hr tablet            Reason for visit is No chief complaint on file.       Encounter provider Mani Gibbons DO    Provider [facility] [place] [address]  [facility] [place] [address]      Recent Visits  No visits were found meeting these conditions.  Showing recent visits within past 7 days and meeting all other requirements  Today's Visits  Date Type Provider Dept   01/05/24 Telemedicine Mani Gibbons DO [facility] [place]   Showing today's visits and meeting all other requirements  Future Appointments  No visits were found meeting these conditions.  Showing future appointments within next 150 days and meeting all other requirements       The patient was identified by name and date of birth. Anuja Mares was informed that this is a telemedicine visit and that the visit is being conducted through Telephone.  My office door was closed. No one else was in the room.  She acknowledged consent and understanding of privacy and security of the video platform. The patient has agreed to participate and understands they can discontinue the visit at any time.    Patient is aware this is a billable service.     Subjective  Anuja Mares is a 83 y.o. female presents today for follow up sick visit .  Was seen on 12/19/23 for sinus inufection ongoing  for 1-4 weeks and treated with amoxicillin. She felt better.   Symptoms: congestion, myalgias, cough, fevers, chills, sweats  Symptom onset: 1/2/2024  Medications tried: none  COVID vaccine: fully vaccinated  COVID tests: negative 1/4/2024  Sick contacts:  and family during new years day        HPI     Past Medical History:   Diagnosis Date   • Bladder polyps    • Breast cancer (HCC)     Left   • Cholelithiasis    • GERD (gastroesophageal reflux disease)    • Hematuria    • Psoriasis        Past Surgical History:   Procedure Laterality Date   • APPENDECTOMY     • BREAST SURGERY Left     Mastectomy   • CHOLECYSTECTOMY     • CYSTOSCOPY     • MASTECTOMY Left        Current Outpatient Medications   Medication Sig Dispense Refill   • guaiFENesin (MUCINEX) 600 mg 12 hr tablet Take 2 tablets (1,200 mg total) by mouth every 12 (twelve) hours 60 tablet 0   • oseltamivir (TAMIFLU) 75 mg capsule Take 1 capsule (75 mg total) by mouth every 12 (twelve) hours for 5 days 10 capsule 0   • acetaminophen (TYLENOL) 100 mg/mL solution Take 10 mg/kg by mouth every 4 (four) hours as needed for fever     • Ascorbic Acid (VITAMIN C) 1000 MG tablet Take by mouth daily      • B-Complex-C CAPS Take by mouth daily      • cholecalciferol (VITAMIN D3) 400 units tablet Take 400 Units by mouth daily     • clobetasol (TEMOVATE) 0.05 % external solution APPLY TWO TIMES A DAY TO SCALP AS NEEDED     • famotidine (PEPCID) 20 mg tablet Take 20 mg by mouth daily       • zinc gluconate 50 mg tablet Take 50 mg by mouth daily       No current facility-administered medications for this visit.        Allergies   Allergen Reactions   • No Active Allergies        Review of Systems   Constitutional:  Positive for appetite change, chills, diaphoresis, fatigue and fever. Negative for activity change.   HENT:  Positive for congestion and rhinorrhea. Negative for ear pain, hearing loss, postnasal drip, sinus pressure, sinus pain, sneezing and sore throat.     Respiratory:  Positive for cough. Negative for chest tightness, shortness of breath and wheezing.    Cardiovascular:  Negative for chest pain, palpitations and leg swelling.   Gastrointestinal:  Negative for abdominal pain, blood in stool, constipation, diarrhea, nausea and vomiting.   Genitourinary:  Negative for dysuria, frequency, hematuria and urgency.   Musculoskeletal:  Negative for arthralgias and myalgias.   Neurological:  Negative for dizziness, syncope, weakness, light-headedness, numbness and headaches.       Video Exam    There were no vitals filed for this visit.    It was my intent to perform this visit via video technology but the patient was not able to do a video connection so the visit was completed via audio telephone only.      Visit Time  Total Visit Duration: 15

## 2024-01-09 ENCOUNTER — TELEPHONE (OUTPATIENT)
Age: 84
End: 2024-01-09

## 2024-01-10 ENCOUNTER — OFFICE VISIT (OUTPATIENT)
Dept: FAMILY MEDICINE CLINIC | Facility: CLINIC | Age: 84
End: 2024-01-10
Payer: MEDICARE

## 2024-01-10 VITALS
DIASTOLIC BLOOD PRESSURE: 60 MMHG | BODY MASS INDEX: 23.1 KG/M2 | OXYGEN SATURATION: 96 % | SYSTOLIC BLOOD PRESSURE: 100 MMHG | HEIGHT: 67 IN | TEMPERATURE: 97.6 F | WEIGHT: 147.2 LBS | HEART RATE: 108 BPM

## 2024-01-10 DIAGNOSIS — C50.919 MALIGNANT NEOPLASM OF FEMALE BREAST, UNSPECIFIED ESTROGEN RECEPTOR STATUS, UNSPECIFIED LATERALITY, UNSPECIFIED SITE OF BREAST (HCC): ICD-10-CM

## 2024-01-10 DIAGNOSIS — F33.9 DEPRESSION, RECURRENT (HCC): ICD-10-CM

## 2024-01-10 DIAGNOSIS — R05.1 ACUTE COUGH: Primary | ICD-10-CM

## 2024-01-10 PROCEDURE — 99214 OFFICE O/P EST MOD 30 MIN: CPT | Performed by: FAMILY MEDICINE

## 2024-01-10 RX ORDER — PREDNISONE 20 MG/1
40 TABLET ORAL DAILY
Qty: 10 TABLET | Refills: 0 | Status: SHIPPED | OUTPATIENT
Start: 2024-01-10 | End: 2024-01-15

## 2024-01-10 RX ORDER — AZITHROMYCIN 250 MG/1
TABLET, FILM COATED ORAL DAILY
Qty: 6 TABLET | Refills: 0 | Status: SHIPPED | OUTPATIENT
Start: 2024-01-10 | End: 2024-01-15

## 2024-01-10 NOTE — PROGRESS NOTES
Depression Screening and Follow-up Plan: Patient was screened for depression during today's encounter. They screened negative with a PHQ-9 score of 0.          Assessment/Plan:      1. Acute cough  -     azithromycin (Zithromax) 250 mg tablet; Take 2 tablets (500 mg total) by mouth daily for 1 day, THEN 1 tablet (250 mg total) daily for 4 days.  -     predniSONE 20 mg tablet; Take 2 tablets (40 mg total) by mouth daily for 5 days    2. Depression, recurrent (HCC)    3. Malignant neoplasm of female breast, unspecified estrogen receptor status, unspecified laterality, unspecified site of breast (HCC)            Subjective:      Patient ID: Anuja Mares is a 83 y.o. female presents today for sick follow up. Was seen virtually on 1/5/2024 and diagnosed with suspected influenza and started on tamiflu.  Symptoms: congestion, rhinorrhea, sore throat, productive cough and shortness of breath  Symptom onset: 1/3/2024  Medications tried: tamiflu  COVID vaccine: fully  COVID tests: negative  Sick contacts: none  unchanged    HPI    The following portions of the patient's history were reviewed and updated as appropriate: allergies, current medications, past family history, past medical history, past social history, past surgical history, and problem list.    Review of Systems   Constitutional:  Negative for activity change, chills, diaphoresis and fever.   HENT:  Positive for rhinorrhea and sore throat. Negative for ear pain, hearing loss, postnasal drip, sinus pressure, sinus pain and sneezing.    Respiratory:  Positive for cough and shortness of breath. Negative for chest tightness and wheezing.    Cardiovascular:  Negative for chest pain, palpitations and leg swelling.   Gastrointestinal:  Negative for abdominal pain, blood in stool, constipation, diarrhea, nausea and vomiting.   Genitourinary:  Negative for dysuria, frequency, hematuria and urgency.   Musculoskeletal:  Negative for arthralgias and myalgias.  "  Neurological:  Negative for dizziness, syncope, weakness, light-headedness, numbness and headaches.         Objective:      /60 (BP Location: Left arm, Patient Position: Sitting, Cuff Size: Standard)   Pulse (!) 108   Temp 97.6 °F (36.4 °C) (Temporal)   Ht 5' 7\" (1.702 m)   Wt 66.8 kg (147 lb 3.2 oz)   SpO2 96%   BMI 23.05 kg/m²          Physical Exam  Vitals reviewed.   Constitutional:       General: She is not in acute distress.     Appearance: She is well-developed. She is not diaphoretic.   HENT:      Head: Normocephalic and atraumatic.      Right Ear: External ear normal.      Left Ear: External ear normal.      Nose: Nose normal. No congestion.      Mouth/Throat:      Mouth: Mucous membranes are moist.      Pharynx: Oropharynx is clear. No oropharyngeal exudate.   Eyes:      General: No scleral icterus.        Right eye: No discharge.         Left eye: No discharge.      Conjunctiva/sclera: Conjunctivae normal.      Pupils: Pupils are equal, round, and reactive to light.   Neck:      Thyroid: No thyromegaly.      Vascular: No JVD.      Trachea: No tracheal deviation.   Cardiovascular:      Rate and Rhythm: Normal rate and regular rhythm.      Heart sounds: Normal heart sounds. No murmur heard.     No friction rub. No gallop.   Pulmonary:      Effort: Pulmonary effort is normal. No respiratory distress.      Breath sounds: Examination of the left-middle field reveals wheezing and rhonchi. Examination of the left-lower field reveals wheezing and rhonchi. Wheezing and rhonchi present. No rales.   Chest:      Chest wall: No tenderness.   Abdominal:      General: Bowel sounds are normal. There is no distension.      Palpations: Abdomen is soft.      Tenderness: There is no abdominal tenderness. There is no right CVA tenderness, left CVA tenderness, guarding or rebound.   Musculoskeletal:         General: Normal range of motion.      Cervical back: Normal range of motion and neck supple. No tenderness. "      Right lower leg: No edema.      Left lower leg: No edema.   Lymphadenopathy:      Cervical: No cervical adenopathy.   Skin:     General: Skin is warm and dry.   Neurological:      Mental Status: She is alert and oriented to person, place, and time. Mental status is at baseline.   Psychiatric:         Mood and Affect: Mood normal.         Behavior: Behavior normal.         Thought Content: Thought content normal.         Judgment: Judgment normal.

## 2024-01-12 ENCOUNTER — TELEPHONE (OUTPATIENT)
Age: 84
End: 2024-01-12

## 2024-01-12 NOTE — TELEPHONE ENCOUNTER
Patient called to make a follow up appt regarding the teroid and antibiotic she was prescribed. Also would like her office note from last visit. OV scheduled

## 2024-01-15 ENCOUNTER — OFFICE VISIT (OUTPATIENT)
Dept: FAMILY MEDICINE CLINIC | Facility: CLINIC | Age: 84
End: 2024-01-15
Payer: MEDICARE

## 2024-01-15 ENCOUNTER — TELEPHONE (OUTPATIENT)
Age: 84
End: 2024-01-15

## 2024-01-15 VITALS
TEMPERATURE: 97.6 F | HEIGHT: 67 IN | BODY MASS INDEX: 23.2 KG/M2 | DIASTOLIC BLOOD PRESSURE: 88 MMHG | WEIGHT: 147.8 LBS | SYSTOLIC BLOOD PRESSURE: 158 MMHG | OXYGEN SATURATION: 100 % | HEART RATE: 84 BPM

## 2024-01-15 DIAGNOSIS — R05.1 ACUTE COUGH: Primary | ICD-10-CM

## 2024-01-15 DIAGNOSIS — F33.9 DEPRESSION, RECURRENT (HCC): ICD-10-CM

## 2024-01-15 PROCEDURE — 99214 OFFICE O/P EST MOD 30 MIN: CPT | Performed by: FAMILY MEDICINE

## 2024-01-15 RX ORDER — PROMETHAZINE HYDROCHLORIDE AND CODEINE PHOSPHATE 6.25; 1 MG/5ML; MG/5ML
5 SYRUP ORAL EVERY 4 HOURS PRN
Qty: 118 ML | Refills: 0 | Status: SHIPPED | OUTPATIENT
Start: 2024-01-15 | End: 2024-01-15

## 2024-01-15 RX ORDER — ALBUTEROL SULFATE 90 UG/1
2 AEROSOL, METERED RESPIRATORY (INHALATION) EVERY 6 HOURS PRN
Qty: 18 G | Refills: 0 | Status: SHIPPED | OUTPATIENT
Start: 2024-01-15

## 2024-01-15 NOTE — PROGRESS NOTES
Assessment/Plan:      1. Acute cough  Assessment & Plan:  Unchanged  Continues to have coughing spells causing headaches and fatigue  This is worse at night  Did not improve with azithromycin or prednisone but mucus has improved  Start promethazine-codeine cough syrup 5 mL every 4 as needed  Discussed with patient on side effects of cough medication for someone her age.  Patient will take cough medicine only at bedtime.  Patient has family history of asthma: Will also provide patient with an albuterol inhaler  Follow-up as needed    Orders:  -     promethazine-codeine (PHENERGAN WITH CODEINE) 6.25-10 mg/5 mL syrup; Take 5 mL by mouth every 4 (four) hours as needed for cough  -     albuterol (Proventil HFA) 90 mcg/act inhaler; Inhale 2 puffs every 6 (six) hours as needed for wheezing    2. Depression, recurrent (HCC)  Assessment & Plan:  Worsening  Patient is exhausted and has decreased diet from caring for her  who has dementia  Patient reports decreased energy and generalized fatigue  Discussed with patient that she would likely qualify for respite care and to discuss either with our  or the  for her  who sees geriatrician.  Referral also provided for behavioral health therapy with [facility] although she may get in sooner from a private counselor/therapist  Patient declines discussion on antidepressants  Will follow-up in 4 weeks    Orders:  -     Ambulatory referral to Psych Services; Future  -     Ambulatory Referral to Social Work Care Management Program; Future            Subjective:      Patient ID: Anuja Mares is a 83 y.o. female presents today for follow up sick visit. She was treated with prednisone and azithromycin. Continues to have strong cough, fatigue, headache, and exhaustion.    HPI    The following portions of the patient's history were reviewed and updated as appropriate: allergies, current medications, past family history, past medical history,  "past social history, past surgical history, and problem list.    Review of Systems   Constitutional:  Positive for fatigue. Negative for activity change, chills, diaphoresis and fever.   HENT:  Negative for ear pain, hearing loss, postnasal drip, rhinorrhea, sinus pressure, sinus pain, sneezing and sore throat.    Respiratory:  Positive for cough. Negative for chest tightness, shortness of breath and wheezing.    Cardiovascular:  Negative for chest pain, palpitations and leg swelling.   Gastrointestinal:  Negative for abdominal pain, blood in stool, constipation, diarrhea, nausea and vomiting.   Genitourinary:  Negative for dysuria, frequency, hematuria and urgency.   Musculoskeletal:  Negative for arthralgias and myalgias.   Neurological:  Negative for dizziness, syncope, weakness, light-headedness, numbness and headaches.         Objective:      /88 (BP Location: Left arm, Patient Position: Sitting, Cuff Size: Adult)   Pulse 84   Temp 97.6 °F (36.4 °C) (Temporal)   Ht 5' 7\" (1.702 m)   Wt 67 kg (147 lb 12.8 oz)   SpO2 100%   BMI 23.15 kg/m²          Physical Exam  Vitals reviewed.   Constitutional:       General: She is not in acute distress.     Appearance: She is well-developed. She is not diaphoretic.   HENT:      Head: Normocephalic and atraumatic.      Right Ear: External ear normal.      Left Ear: External ear normal.      Nose: Nose normal. No congestion.      Mouth/Throat:      Mouth: Mucous membranes are moist.      Pharynx: Oropharynx is clear. No oropharyngeal exudate.   Eyes:      General: No scleral icterus.        Right eye: No discharge.         Left eye: No discharge.      Conjunctiva/sclera: Conjunctivae normal.      Pupils: Pupils are equal, round, and reactive to light.   Neck:      Thyroid: No thyromegaly.      Vascular: No JVD.      Trachea: No tracheal deviation.   Cardiovascular:      Rate and Rhythm: Normal rate and regular rhythm.      Heart sounds: Normal heart sounds. No " murmur heard.     No friction rub. No gallop.   Pulmonary:      Effort: Pulmonary effort is normal. No respiratory distress.      Breath sounds: Normal breath sounds. No wheezing or rales.   Chest:      Chest wall: No tenderness.   Abdominal:      General: Bowel sounds are normal. There is no distension.      Palpations: Abdomen is soft.      Tenderness: There is no abdominal tenderness. There is no right CVA tenderness, left CVA tenderness, guarding or rebound.   Musculoskeletal:         General: Normal range of motion.      Cervical back: Normal range of motion and neck supple. No tenderness.      Right lower leg: No edema.      Left lower leg: No edema.   Lymphadenopathy:      Cervical: No cervical adenopathy.   Skin:     General: Skin is warm and dry.   Neurological:      Mental Status: She is alert and oriented to person, place, and time. Mental status is at baseline.   Psychiatric:         Mood and Affect: Mood normal.         Behavior: Behavior normal.         Thought Content: Thought content normal.         Judgment: Judgment normal.

## 2024-01-15 NOTE — ASSESSMENT & PLAN NOTE
Worsening  Patient is exhausted and has decreased diet from caring for her  who has dementia  Patient reports decreased energy and generalized fatigue  Discussed with patient that she would likely qualify for respite care and to discuss either with our  or the  for her  who sees geriatrician.  Referral also provided for behavioral health therapy with [facility] although she may get in sooner from a private counselor/therapist  Patient declines discussion on antidepressants  Will follow-up in 4 weeks

## 2024-01-15 NOTE — TELEPHONE ENCOUNTER
Patients pharmacy called and stated they do not stock or carry promethazeine- codeine. Pharmacy would like to know if you would like to proscribe anything else? Please advise

## 2024-01-15 NOTE — ASSESSMENT & PLAN NOTE
Unchanged  Continues to have coughing spells causing headaches and fatigue  This is worse at night  Did not improve with azithromycin or prednisone but mucus has improved  Start promethazine-codeine cough syrup 5 mL every 4 as needed  Discussed with patient on side effects of cough medication for someone her age.  Patient will take cough medicine only at bedtime.  Patient has family history of asthma: Will also provide patient with an albuterol inhaler  Follow-up as needed

## 2024-01-16 ENCOUNTER — TELEPHONE (OUTPATIENT)
Age: 84
End: 2024-01-16

## 2024-01-16 DIAGNOSIS — R05.1 ACUTE COUGH: Primary | ICD-10-CM

## 2024-01-16 RX ORDER — CODEINE PHOSPHATE/GUAIFENESIN 10-100MG/5
5 LIQUID (ML) ORAL 3 TIMES DAILY PRN
Qty: 118 ML | Refills: 0 | Status: SHIPPED | OUTPATIENT
Start: 2024-01-16

## 2024-01-16 NOTE — TELEPHONE ENCOUNTER
Call from [facility] pharmacist on Rx: Guaifenesin/Codeine. They do not have there 200-8 mg/5 ml, they have Rx: Guaifenesin-Codeine 100-10 mg/ 5 ml instead.    Please review and advise- resubmit  Thank you.

## 2024-01-19 ENCOUNTER — PATIENT OUTREACH (OUTPATIENT)
Dept: FAMILY MEDICINE CLINIC | Facility: CLINIC | Age: 84
End: 2024-01-19

## 2024-01-19 NOTE — PROGRESS NOTES
ELIAN STAKC received referral regarding patient being interested in mental health resources, such as a therapist.    ELIAN STACK placed call to the Anuja tee who advised unable to talk at this time due to caring for her . She will call ELIAN STACK back at a later time. ELIAN STACK will await return call to provide resources and psychosocial support as needed.

## 2024-01-23 ENCOUNTER — PATIENT OUTREACH (OUTPATIENT)
Dept: FAMILY MEDICINE CLINIC | Facility: CLINIC | Age: 84
End: 2024-01-23

## 2024-01-23 NOTE — PROGRESS NOTES
ELIAN STACK returned missed call from the patient, Anuja and son, Mack answered the phone and took message for her to callback. ELIAN STACK will await return call to provide resources and psychosocial support as needed.

## 2024-01-29 ENCOUNTER — PATIENT OUTREACH (OUTPATIENT)
Dept: FAMILY MEDICINE CLINIC | Facility: CLINIC | Age: 84
End: 2024-01-29

## 2024-01-29 NOTE — PROGRESS NOTES
ELIAN STACK left message with [facility] regarding insurance coverage.     ELIAN STACK then contacted Anuja to discuss options. She prefers in person and in [place].    ELIAN STACK will continue to remain available for psychosocial support as needed.

## 2024-01-29 NOTE — PROGRESS NOTES
Following conversation ELIAN STACK contacted [facility]/[employer] supplemental plan and spoke with representative, Jp. ELIAN STACK was informed that there are  no precert or preauth. They follow Medicare guidelines. If Medicare is accepted.     ELIAN STACK placed call to [facility] and was informed that they have a closed waiting list.     [place] [facility] and [facility] accept Medicare. However, there is waiting list and uncertain of length of time.

## 2024-01-29 NOTE — PROGRESS NOTES
"ELIAN STACK returned missed call from the patient, Anuja and discussed referral. She indicated that she has \"too many things to handle\". She is caring for sick spouse. She has family in the area but they are busy. She is interested in talk therapy but not psychiatry. ELIAN STACK noted in chart that  [facility] tried contacting her via [employer]. She would prefer a community mental health provider as she requested whatever location is the quickest. Anuja would prefer information both over the phone and email.       "

## 2024-01-30 ENCOUNTER — PATIENT OUTREACH (OUTPATIENT)
Dept: FAMILY MEDICINE CLINIC | Facility: CLINIC | Age: 84
End: 2024-01-30

## 2024-01-30 NOTE — PROGRESS NOTES
ELIAN STACK sent email with 3 therapists who accept Medicare and offer in person sessions through [employer]:    Che Oscar ([facility])   Ina Shell ([facility])  Elizabet Burrell ([facility])    ELIAN STACK then called patient, Anuja and explained same. ELIAN STACK read off what the specialize in. She preferred if ELIAN STACK could contact them first she has no preference to any of them. ELIAN STACK assured Anuja will verify if accepts her insurance and that if she does not like the services she can change therapists.     ELIAN STACK left message with Che Oscar. ELIAN STACK sent email inquiry to Ina Shell. ELIAN STACK left message with [facility] for Elizabet Burrell.     ELIAN STACK will continue to remain available for psychosocial support as needed.

## 2024-01-31 ENCOUNTER — PATIENT OUTREACH (OUTPATIENT)
Dept: FAMILY MEDICINE CLINIC | Facility: CLINIC | Age: 84
End: 2024-01-31

## 2024-01-31 NOTE — PROGRESS NOTES
ELIAN STACK received voice message from [facility] advising that Elizabet Burrell is not accepting new clients but they have other therapists available.     ELIAN STACK also received voice message from [facility] confirming they accept Medicare and are accepting new clients.     ELIAN STACK returned call to [facility] and spoke with Jael and provided intake information. They will call patient today to schedule and likely can get her an intake later this week or next week.    ELIAN STACK placed call to the patient, Anuja and explained that she will be getting a call from [facility]. She was grateful for the assistance. She likely cannot take the call today as she has to take spouse to the hospital. ELIAN STACK assured her she can call them back at a time convenient for her.    ELIAN STACK will continue to remain available for psychosocial support as needed.

## 2024-02-07 ENCOUNTER — TELEPHONE (OUTPATIENT)
Age: 84
End: 2024-02-07

## 2024-02-07 ENCOUNTER — PATIENT OUTREACH (OUTPATIENT)
Dept: FAMILY MEDICINE CLINIC | Facility: CLINIC | Age: 84
End: 2024-02-07

## 2024-02-07 NOTE — PROGRESS NOTES
ELIAN STACK received a message from patient, Anuja confused about her therapy options.     ELIAN STACK returned call to Anuja who advised currently unable to talk. ELIAN STACK will callback at a later time.    ELIAN STACK will continue to remain available for psychosocial support as needed.

## 2024-02-07 NOTE — PROGRESS NOTES
ELIAN STACK placed additional call to Anuja as requested. She did get a call regarding therapy but did not call back because the lady who called was not on the list ELIAN STACK had provided. ELIAN STACK explained those specific therapists are not accepting new clients however they have other therapists available. She expressed understanding.     ELIAN STACK explained that was the  who called and gave number and extension to call. ELIAN STACK also left message with [facility] requesting they call patient again.     ELIAN STACK will continue to remain available for psychosocial support as needed.

## 2024-02-07 NOTE — TELEPHONE ENCOUNTER
Patient called asking for xray and blood work. She would like an appt made after results came in.

## 2024-02-09 DIAGNOSIS — R05.1 ACUTE COUGH: Primary | ICD-10-CM

## 2024-02-09 NOTE — TELEPHONE ENCOUNTER
Patient aware that order for chest  xray is placed     blood work that was requested was because she has a cough  patient was told she would need to be evaluated to see what blood work would be appropriate

## 2024-02-15 ENCOUNTER — PATIENT OUTREACH (OUTPATIENT)
Dept: FAMILY MEDICINE CLINIC | Facility: CLINIC | Age: 84
End: 2024-02-15

## 2024-02-15 NOTE — PROGRESS NOTES
ELIAN STACK placed follow up call to the patient, Anuja who is aware of upcoming appointment and is looking forward to it. Anibalsimeon inquired if it is weekly sessions. ELIAN STACK encouraged her to speak with [facility] regarding scheduling. She expressed understanding.     ELIAN STACK will close referral due to goal complete. Patient is aware to contact office or SW CM directly as needs arise. ELIAN CM will remain available for psychosocial support as needed.

## 2024-02-21 PROBLEM — R05.1 ACUTE COUGH: Status: RESOLVED | Noted: 2024-01-10 | Resolved: 2024-02-21

## 2024-02-21 PROBLEM — J11.1 FLU: Status: RESOLVED | Noted: 2024-01-05 | Resolved: 2024-02-21

## 2024-02-23 ENCOUNTER — APPOINTMENT (OUTPATIENT)
Dept: RADIOLOGY | Age: 84
End: 2024-02-23
Payer: MEDICARE

## 2024-02-23 DIAGNOSIS — R05.1 ACUTE COUGH: ICD-10-CM

## 2024-02-23 PROCEDURE — 71046 X-RAY EXAM CHEST 2 VIEWS: CPT

## 2024-02-26 ENCOUNTER — TELEPHONE (OUTPATIENT)
Dept: FAMILY MEDICINE CLINIC | Facility: CLINIC | Age: 84
End: 2024-02-26

## 2024-03-14 ENCOUNTER — TELEPHONE (OUTPATIENT)
Dept: PSYCHIATRY | Facility: CLINIC | Age: 84
End: 2024-03-14

## 2024-03-14 NOTE — TELEPHONE ENCOUNTER
Contacted patient in regards to Routine Referral in attempts to verify patient's needs of services and add patient to proper wait list. LVM for patient to contact intake dept  in regards to referral.

## 2024-03-20 ENCOUNTER — OFFICE VISIT (OUTPATIENT)
Dept: FAMILY MEDICINE CLINIC | Facility: CLINIC | Age: 84
End: 2024-03-20
Payer: MEDICARE

## 2024-03-20 VITALS
BODY MASS INDEX: 23.92 KG/M2 | SYSTOLIC BLOOD PRESSURE: 124 MMHG | HEART RATE: 87 BPM | HEIGHT: 67 IN | TEMPERATURE: 97.2 F | WEIGHT: 152.4 LBS | OXYGEN SATURATION: 96 % | DIASTOLIC BLOOD PRESSURE: 62 MMHG

## 2024-03-20 DIAGNOSIS — K21.9 GASTROESOPHAGEAL REFLUX DISEASE WITHOUT ESOPHAGITIS: Primary | Chronic | ICD-10-CM

## 2024-03-20 DIAGNOSIS — R05.3 CHRONIC COUGH: ICD-10-CM

## 2024-03-20 PROCEDURE — 99214 OFFICE O/P EST MOD 30 MIN: CPT | Performed by: FAMILY MEDICINE

## 2024-03-20 PROCEDURE — G2211 COMPLEX E/M VISIT ADD ON: HCPCS | Performed by: FAMILY MEDICINE

## 2024-03-20 NOTE — PROGRESS NOTES
Assessment/Plan:      1. Gastroesophageal reflux disease without esophagitis  Assessment & Plan:  Acutely worsening  Follows with gastroenterology but has not been seen and due for follow-up  Remains on Pepcid 20 mg daily  Continues to have stomach discomfort  No episodes of melena or blood in stool    Orders:  -     Ambulatory Referral to Gastroenterology; Future    2. Chronic cough  Assessment & Plan:  Slight improvement  Admits to dry cough worse at night  Has had treatments with azithromycin, and prednisone  Chest x-ray was negative for infection or consolidation  Lungs are clear to auscultation  May be secondary to LPR in the setting of uncontrolled GERD  Follow-up with gastroenterology                Subjective:      Patient ID: Anuja Mares is a 83 y.o. female presents today for stomach problems. Takes pepcid daily. Continues ot have dry cough.  Reviewed chest x-ray with patient    HPI    The following portions of the patient's history were reviewed and updated as appropriate: allergies, current medications, past family history, past medical history, past social history, past surgical history, and problem list.    Review of Systems   Constitutional:  Negative for activity change, chills, diaphoresis and fever.   HENT:  Negative for ear pain, hearing loss, postnasal drip, rhinorrhea, sinus pressure, sinus pain, sneezing and sore throat.    Respiratory:  Positive for cough. Negative for chest tightness, shortness of breath and wheezing.    Cardiovascular:  Negative for chest pain, palpitations and leg swelling.   Gastrointestinal:  Positive for abdominal pain and nausea. Negative for blood in stool, constipation, diarrhea and vomiting.   Genitourinary:  Negative for dysuria, frequency, hematuria and urgency.   Musculoskeletal:  Negative for arthralgias and myalgias.   Neurological:  Negative for dizziness, syncope, weakness, light-headedness, numbness and headaches.         Objective:      /62  "(BP Location: Left arm, Patient Position: Sitting, Cuff Size: Large)   Pulse 87   Temp (!) 97.2 °F (36.2 °C)   Ht 5' 7\" (1.702 m)   Wt 69.1 kg (152 lb 6.4 oz)   SpO2 96%   BMI 23.87 kg/m²          Physical Exam  Vitals reviewed.   Constitutional:       General: She is not in acute distress.     Appearance: She is well-developed. She is not diaphoretic.   HENT:      Head: Normocephalic and atraumatic.      Right Ear: External ear normal.      Left Ear: External ear normal.      Nose: Nose normal. No congestion.      Mouth/Throat:      Mouth: Mucous membranes are moist.      Pharynx: Oropharynx is clear. No oropharyngeal exudate.   Eyes:      General: No scleral icterus.        Right eye: No discharge.         Left eye: No discharge.      Conjunctiva/sclera: Conjunctivae normal.      Pupils: Pupils are equal, round, and reactive to light.   Neck:      Thyroid: No thyromegaly.      Vascular: No JVD.      Trachea: No tracheal deviation.   Cardiovascular:      Rate and Rhythm: Normal rate and regular rhythm.      Heart sounds: Normal heart sounds. No murmur heard.     No friction rub. No gallop.   Pulmonary:      Effort: Pulmonary effort is normal. No respiratory distress.      Breath sounds: Normal breath sounds. No wheezing or rales.   Chest:      Chest wall: No tenderness.   Abdominal:      General: Bowel sounds are normal. There is no distension.      Palpations: Abdomen is soft.      Tenderness: There is no abdominal tenderness. There is no right CVA tenderness, left CVA tenderness, guarding or rebound.   Musculoskeletal:         General: Normal range of motion.      Cervical back: Normal range of motion and neck supple. No tenderness.      Right lower leg: No edema.      Left lower leg: No edema.   Lymphadenopathy:      Cervical: No cervical adenopathy.   Skin:     General: Skin is warm and dry.   Neurological:      Mental Status: She is alert and oriented to person, place, and time. Mental status is at " baseline.   Psychiatric:         Mood and Affect: Mood normal.         Behavior: Behavior normal.         Thought Content: Thought content normal.         Judgment: Judgment normal.

## 2024-03-21 PROBLEM — R05.3 CHRONIC COUGH: Status: ACTIVE | Noted: 2024-01-10

## 2024-03-21 NOTE — ASSESSMENT & PLAN NOTE
Slight improvement  Admits to dry cough worse at night  Has had treatments with azithromycin, and prednisone  Chest x-ray was negative for infection or consolidation  Lungs are clear to auscultation  May be secondary to LPR in the setting of uncontrolled GERD  Follow-up with gastroenterology

## 2024-03-21 NOTE — ASSESSMENT & PLAN NOTE
Acutely worsening  Follows with gastroenterology but has not been seen and due for follow-up  Remains on Pepcid 20 mg daily  Continues to have stomach discomfort  No episodes of melena or blood in stool

## 2024-06-20 ENCOUNTER — TELEPHONE (OUTPATIENT)
Dept: GASTROENTEROLOGY | Facility: MEDICAL CENTER | Age: 84
End: 2024-06-20

## 2024-06-20 NOTE — TELEPHONE ENCOUNTER
Left voicemail and requested call back     Called patient to inform that appointment scheduled for 09/27/2024 has been rescheduled due to providers schedule change, new patient appointment will be on 10/01/2024 with Dr. Van this is our earliest appointment did place patient on waiting list for any cancellations she will be notified. Please call to confirm to reschedule.

## 2024-07-19 ENCOUNTER — APPOINTMENT (OUTPATIENT)
Dept: RADIOLOGY | Age: 84
End: 2024-07-19
Payer: MEDICARE

## 2024-07-19 ENCOUNTER — HOSPITAL ENCOUNTER (OUTPATIENT)
Dept: RADIOLOGY | Age: 84
Discharge: HOME | End: 2024-07-19
Payer: MEDICARE

## 2024-07-19 DIAGNOSIS — M41.46 NEUROMUSCULAR SCOLIOSIS OF LUMBAR REGION: ICD-10-CM

## 2024-07-19 DIAGNOSIS — M54.40 LOW BACK PAIN WITH SCIATICA, SCIATICA LATERALITY UNSPECIFIED, UNSPECIFIED BACK PAIN LATERALITY, UNSPECIFIED CHRONICITY: ICD-10-CM

## 2024-07-19 PROCEDURE — 72148 MRI LUMBAR SPINE W/O DYE: CPT

## 2024-07-19 PROCEDURE — 72110 X-RAY EXAM L-2 SPINE 4/>VWS: CPT

## 2024-10-16 ENCOUNTER — TELEPHONE (OUTPATIENT)
Dept: FAMILY MEDICINE CLINIC | Facility: CLINIC | Age: 84
End: 2024-10-16

## 2025-02-04 ENCOUNTER — HOSPITAL ENCOUNTER (OUTPATIENT)
Dept: RADIOLOGY | Age: 85
Discharge: HOME | End: 2025-02-04
Payer: MEDICARE

## 2025-02-04 VITALS — WEIGHT: 140 LBS | HEIGHT: 67 IN | BODY MASS INDEX: 21.97 KG/M2

## 2025-02-04 DIAGNOSIS — Z12.31 ENCOUNTER FOR SCREENING MAMMOGRAM FOR MALIGNANT NEOPLASM OF BREAST: ICD-10-CM

## 2025-02-04 PROCEDURE — 77067 SCR MAMMO BI INCL CAD: CPT

## 2025-02-04 PROCEDURE — 77063 BREAST TOMOSYNTHESIS BI: CPT

## 2025-02-05 ENCOUNTER — OFFICE VISIT (OUTPATIENT)
Dept: GASTROENTEROLOGY | Facility: CLINIC | Age: 85
End: 2025-02-05
Payer: MEDICARE

## 2025-02-05 VITALS
DIASTOLIC BLOOD PRESSURE: 76 MMHG | BODY MASS INDEX: 23.54 KG/M2 | SYSTOLIC BLOOD PRESSURE: 122 MMHG | HEIGHT: 67 IN | TEMPERATURE: 97.2 F | WEIGHT: 150 LBS

## 2025-02-05 DIAGNOSIS — Z80.0 FAMILY HISTORY OF GASTRIC CANCER: ICD-10-CM

## 2025-02-05 DIAGNOSIS — Z80.0 FAMILY HISTORY OF PANCREATIC CANCER: ICD-10-CM

## 2025-02-05 DIAGNOSIS — K21.9 GASTROESOPHAGEAL REFLUX DISEASE WITHOUT ESOPHAGITIS: Primary | Chronic | ICD-10-CM

## 2025-02-05 PROCEDURE — 99214 OFFICE O/P EST MOD 30 MIN: CPT | Performed by: INTERNAL MEDICINE

## 2025-02-05 PROCEDURE — G2211 COMPLEX E/M VISIT ADD ON: HCPCS | Performed by: INTERNAL MEDICINE

## 2025-02-05 NOTE — PROGRESS NOTES
Name: Anuja Mares      : 1940      MRN: [id number]  Encounter Provider: Nesha Carranza DO  Encounter Date: 2025   Encounter department: [facility] [place]  :  Assessment & Plan  Gastroesophageal reflux disease without esophagitis  Longstanding GERD for many years. Only on pepcid 10mg daily. Discussed risk of tachyphylaxis with pepcid and that it may not have effect since she has been on it for a long time. Discussed option to switch to PPI, she is hesitant due to potential side effects and would like to try to increase dose of pepcid first. If not improving, will reach out and trial PPI. FH stomach cancer, see below        Family history of pancreatic cancer  FH pancreatic and gastric cancers in first degree relatives. Discussed option of MRI to evaluate pancreas given nonspecific GI symptoms. She opts to defer for now but will consider it. We also discussed option of endoscopy given FH stomach cancer in father and PGM. She would like to avoid this as well but will consider. Lower suspicion for either given lack of weight loss, constitutional sx, jaundice, melena/hematochezia.        Family history of gastric cancer  As above        RTC 6 months or sooner in interim prn     History of Present Illness   HPI  Anuja Mares is a 84 y.o. female who presents for follow up. H/o breast cancer s/p mastectomy and chemo, psoriasis, PUD.     Last GI visit with Dr. Darling 3/2023. At that time, was having melena. History of PUD related to stress when moving to [place] per her. Melena had resolved and Hb was stable thus no endoscopic eval pursued. Last EGD 7ish years ago, normal per patient.     Last colonoscopy  with diverticulosis otherwise normal.    She saw PCP in 2024. At that time was having abdominal discomfort, no blood in stool. On Pepcid for GERD. Negative cologuard 2023.     She reports reflux for a very long time. Saw Dr. Cleaning last. Only taking pepcid  "10mg daily. Stopped PPI as she was concerned about side effects. Uncomfortable with hunger and fullness. Bloating. Worse with stress.  with alzheimer's and other health issues. Lost weight related to this but none recently, has been able to gain some back. She worries about malignancy as she has strong FH. Denies any night sweats, other constitutional sx.     FH  Pancreatic cancer in sister  Father stomach cancer  Uncle father's side colon cancer   Grandmother paternal stomach cancer   Mother nonHodgkins lymphoma  Patient with breast cancer     History obtained from: patient    Review of Systems  Medical History Reviewed by provider this encounter:  Tobacco  Allergies  Meds  Problems  Med Hx  Surg Hx  Fam Hx     .     Objective   /76 (BP Location: Left arm, Patient Position: Sitting, Cuff Size: Adult)   Temp (!) 97.2 °F (36.2 °C) (Tympanic)   Ht 5' 7\" (1.702 m)   Wt 68 kg (150 lb)   BMI 23.49 kg/m²      Physical Exam  Vitals reviewed.   Constitutional:       Appearance: Normal appearance.      Comments: Appears younger than stated age    HENT:      Head: Normocephalic and atraumatic.      Mouth/Throat:      Mouth: Mucous membranes are moist.   Eyes:      Conjunctiva/sclera: Conjunctivae normal.   Cardiovascular:      Rate and Rhythm: Normal rate and regular rhythm.   Pulmonary:      Effort: Pulmonary effort is normal. No respiratory distress.   Abdominal:      General: Abdomen is flat.      Palpations: Abdomen is soft.      Tenderness: There is no abdominal tenderness.   Musculoskeletal:         General: No swelling.      Cervical back: No tenderness.   Lymphadenopathy:      Cervical: No cervical adenopathy.   Skin:     General: Skin is warm and dry.   Neurological:      General: No focal deficit present.      Mental Status: She is alert.   Psychiatric:         Mood and Affect: Mood normal.       "

## 2025-02-05 NOTE — ASSESSMENT & PLAN NOTE
Longstanding GERD for many years. Only on pepcid 10mg daily. Discussed risk of tachyphylaxis with pepcid and that it may not have effect since she has been on it for a long time. Discussed option to switch to PPI, she is hesitant due to potential side effects and would like to try to increase dose of pepcid first. If not improving, will reach out and trial PPI. FH stomach cancer, see below

## 2025-02-27 ENCOUNTER — OFFICE VISIT (OUTPATIENT)
Dept: FAMILY MEDICINE CLINIC | Facility: CLINIC | Age: 85
End: 2025-02-27
Payer: MEDICARE

## 2025-02-27 VITALS
WEIGHT: 151.8 LBS | TEMPERATURE: 97.6 F | SYSTOLIC BLOOD PRESSURE: 130 MMHG | BODY MASS INDEX: 23.83 KG/M2 | OXYGEN SATURATION: 99 % | HEIGHT: 67 IN | DIASTOLIC BLOOD PRESSURE: 76 MMHG | HEART RATE: 86 BPM

## 2025-02-27 DIAGNOSIS — D64.9 ANEMIA, UNSPECIFIED TYPE: ICD-10-CM

## 2025-02-27 DIAGNOSIS — F43.22 ADJUSTMENT DISORDER WITH ANXIOUS MOOD: ICD-10-CM

## 2025-02-27 DIAGNOSIS — Z00.00 MEDICARE ANNUAL WELLNESS VISIT, SUBSEQUENT: Primary | ICD-10-CM

## 2025-02-27 DIAGNOSIS — K21.9 GASTROESOPHAGEAL REFLUX DISEASE WITHOUT ESOPHAGITIS: Chronic | ICD-10-CM

## 2025-02-27 DIAGNOSIS — C50.919 MALIGNANT NEOPLASM OF FEMALE BREAST, UNSPECIFIED ESTROGEN RECEPTOR STATUS, UNSPECIFIED LATERALITY, UNSPECIFIED SITE OF BREAST (HCC): ICD-10-CM

## 2025-02-27 DIAGNOSIS — M25.50 ARTHRALGIA, UNSPECIFIED JOINT: ICD-10-CM

## 2025-02-27 DIAGNOSIS — R31.29 MICROSCOPIC HEMATURIA: ICD-10-CM

## 2025-02-27 DIAGNOSIS — Z13.1 SCREENING FOR DIABETES MELLITUS: ICD-10-CM

## 2025-02-27 DIAGNOSIS — M48.02 CERVICAL STENOSIS OF SPINAL CANAL: ICD-10-CM

## 2025-02-27 DIAGNOSIS — L40.9 PSORIASIS: ICD-10-CM

## 2025-02-27 PROBLEM — R53.83 OTHER FATIGUE: Status: RESOLVED | Noted: 2023-10-04 | Resolved: 2025-02-27

## 2025-02-27 PROBLEM — Z13.220 SCREENING CHOLESTEROL LEVEL: Status: RESOLVED | Noted: 2018-08-23 | Resolved: 2025-02-27

## 2025-02-27 PROBLEM — R05.3 CHRONIC COUGH: Status: RESOLVED | Noted: 2024-01-10 | Resolved: 2025-02-27

## 2025-02-27 PROCEDURE — G2211 COMPLEX E/M VISIT ADD ON: HCPCS | Performed by: FAMILY MEDICINE

## 2025-02-27 PROCEDURE — G0439 PPPS, SUBSEQ VISIT: HCPCS | Performed by: FAMILY MEDICINE

## 2025-02-27 PROCEDURE — G0537 PR RISK ASCVD TST ONCE PR 12 MO: HCPCS | Performed by: FAMILY MEDICINE

## 2025-02-27 PROCEDURE — 99214 OFFICE O/P EST MOD 30 MIN: CPT | Performed by: FAMILY MEDICINE

## 2025-02-27 NOTE — PATIENT INSTRUCTIONS
Try and see Dr. Foreman for you gastroenterology issues    Medicare Preventive Visit Patient Instructions  Thank you for completing your Welcome to Medicare Visit or Medicare Annual Wellness Visit today. Your next wellness visit will be due in one year (2/28/2026).  The screening/preventive services that you may require over the next 5-10 years are detailed below. Some tests may not apply to you based off risk factors and/or age. Screening tests ordered at today's visit but not completed yet may show as past due. Also, please note that scanned in results may not display below.  Preventive Screenings:  Service Recommendations Previous Testing/Comments   Colorectal Cancer Screening  * Colonoscopy    * Fecal Occult Blood Test (FOBT)/Fecal Immunochemical Test (FIT)  * Fecal DNA/Cologuard Test  * Flexible Sigmoidoscopy Age: 45-75 years old   Colonoscopy: every 10 years (may be performed more frequently if at higher risk)  OR  FOBT/FIT: every 1 year  OR  Cologuard: every 3 years  OR  Sigmoidoscopy: every 5 years  Screening may be recommended earlier than age 45 if at higher risk for colorectal cancer. Also, an individualized decision between you and your healthcare provider will decide whether screening between the ages of 76-85 would be appropriate. Colonoscopy: 05/24/2017  FOBT/FIT: Not on file  Cologuard: 09/05/2023  Sigmoidoscopy: Not on file    Screening Not Indicated     Breast Cancer Screening Age: 40+ years old  Frequency: every 1-2 years  Not required if history of left and right mastectomy Mammogram: 02/04/2025    History Breast Cancer   Cervical Cancer Screening Between the ages of 21-29, pap smear recommended once every 3 years.   Between the ages of 30-65, can perform pap smear with HPV co-testing every 5 years.   Recommendations may differ for women with a history of total hysterectomy, cervical cancer, or abnormal pap smears in past. Pap Smear: Not on file    Screening Not Indicated   Hepatitis C Screening  Once for adults born between 1945 and 1965  More frequently in patients at high risk for Hepatitis C Hep C Antibody: Not on file        Diabetes Screening 1-2 times per year if you're at risk for diabetes or have pre-diabetes Fasting glucose: 87 mg/dL (8/14/2023)  A1C: No results in last 5 years (No results in last 5 years)      Cholesterol Screening Once every 5 years if you don't have a lipid disorder. May order more often based on risk factors. Lipid panel: 03/01/2022    Screening Current     Other Preventive Screenings Covered by Medicare:  Abdominal Aortic Aneurysm (AAA) Screening: covered once if your at risk. You're considered to be at risk if you have a family history of AAA.  Lung Cancer Screening: covers low dose CT scan once per year if you meet all of the following conditions: (1) Age 55-77; (2) No signs or symptoms of lung cancer; (3) Current smoker or have quit smoking within the last 15 years; (4) You have a tobacco smoking history of at least 20 pack years (packs per day multiplied by number of years you smoked); (5) You get a written order from a healthcare provider.  Glaucoma Screening: covered annually if you're considered high risk: (1) You have diabetes OR (2) Family history of glaucoma OR (3)  aged 50 and older OR (4)  American aged 65 and older  Osteoporosis Screening: covered every 2 years if you meet one of the following conditions: (1) You're estrogen deficient and at risk for osteoporosis based off medical history and other findings; (2) Have a vertebral abnormality; (3) On glucocorticoid therapy for more than 3 months; (4) Have primary hyperparathyroidism; (5) On osteoporosis medications and need to assess response to drug therapy.   Last bone density test (DXA Scan): 03/03/2020.  HIV Screening: covered annually if you're between the age of 15-65. Also covered annually if you are younger than 15 and older than 65 with risk factors for HIV infection. For pregnant  patients, it is covered up to 3 times per pregnancy.    Immunizations:  Immunization Recommendations   Influenza Vaccine Annual influenza vaccination during flu season is recommended for all persons aged >= 6 months who do not have contraindications   Pneumococcal Vaccine   * Pneumococcal conjugate vaccine = PCV13 (Prevnar 13), PCV15 (Vaxneuvance), PCV20 (Prevnar 20)  * Pneumococcal polysaccharide vaccine = PPSV23 (Pneumovax) Adults 19-65 yo with certain risk factors or if 65+ yo  If never received any pneumonia vaccine: recommend Prevnar 20 (PCV20)  Give PCV20 if previously received 1 dose of PCV13 or PPSV23   Hepatitis B Vaccine 3 dose series if at intermediate or high risk (ex: diabetes, end stage renal disease, liver disease)   Respiratory syncytial virus (RSV) Vaccine - COVERED BY MEDICARE PART D  * RSVPreF3 (Arexvy) CDC recommends that adults 60 years of age and older may receive a single dose of RSV vaccine using shared clinical decision-making (SCDM)   Tetanus (Td) Vaccine - COST NOT COVERED BY MEDICARE PART B Following completion of primary series, a booster dose should be given every 10 years to maintain immunity against tetanus. Td may also be given as tetanus wound prophylaxis.   Tdap Vaccine - COST NOT COVERED BY MEDICARE PART B Recommended at least once for all adults. For pregnant patients, recommended with each pregnancy.   Shingles Vaccine (Shingrix) - COST NOT COVERED BY MEDICARE PART B  2 shot series recommended in those 19 years and older who have or will have weakened immune systems or those 50 years and older     Health Maintenance Due:      Topic Date Due    Colorectal Cancer Screening  Discontinued     Immunizations Due:      Topic Date Due    Influenza Vaccine (1) 09/01/2024    COVID-19 Vaccine (4 - 2024-25 season) 09/01/2024     Advance Directives   What are advance directives?  Advance directives are legal documents that state your wishes and plans for medical care. These plans are made  ahead of time in case you lose your ability to make decisions for yourself. Advance directives can apply to any medical decision, such as the treatments you want, and if you want to donate organs.   What are the types of advance directives?  There are many types of advance directives, and each state has rules about how to use them. You may choose a combination of any of the following:  Living will:  This is a written record of the treatment you want. You can also choose which treatments you do not want, which to limit, and which to stop at a certain time. This includes surgery, medicine, IV fluid, and tube feedings.   Durable power of  for healthcare (DPAHC):  This is a written record that states who you want to make healthcare choices for you when you are unable to make them for yourself. This person, called a proxy, is usually a family member or a friend. You may choose more than 1 proxy.  Do not resuscitate (DNR) order:  A DNR order is used in case your heart stops beating or you stop breathing. It is a request not to have certain forms of treatment, such as CPR. A DNR order may be included in other types of advance directives.  Medical directive:  This covers the care that you want if you are in a coma, near death, or unable to make decisions for yourself. You can list the treatments you want for each condition. Treatment may include pain medicine, surgery, blood transfusions, dialysis, IV or tube feedings, and a ventilator (breathing machine).  Values history:  This document has questions about your views, beliefs, and how you feel and think about life. This information can help others choose the care that you would choose.  Why are advance directives important?  An advance directive helps you control your care. Although spoken wishes may be used, it is better to have your wishes written down. Spoken wishes can be misunderstood, or not followed. Treatments may be given even if you do not want them. An  advance directive may make it easier for your family to make difficult choices about your care.   Narcotic (Opioid) Safety    Use narcotics safely:  Take prescribed narcotics exactly as directed  Do not give narcotics to others or take narcotics that belong to someone else  Do not mix narcotics without medicines or alcohol  Do not drive or operate heavy machinery after you take the narcotic  Monitor for side effects and notify your healthcare provider if you experienced side effects such as nausea, sleepiness, itching, or trouble thinking clearly.    Manage constipation:    Constipation is the most common side effect of narcotic medicine. Constipation is when you have hard, dry bowel movements, or you go longer than usual between bowel movements. Tell your healthcare provider about all changes in your bowel movements while you are taking narcotics. He or she may recommend laxative medicine to help you have a bowel movement. He or she may also change the kind of narcotic you are taking, or change when you take it. The following are more ways you can prevent or relieve constipation:    Drink liquids as directed.  You may need to drink extra liquids to help soften and move your bowels. Ask how much liquid to drink each day and which liquids are best for you.  Eat high-fiber foods.  This may help decrease constipation by adding bulk to your bowel movements. High-fiber foods include fruits, vegetables, whole-grain breads and cereals, and beans. Your healthcare provider or dietitian can help you create a high-fiber meal plan. Your provider may also recommend a fiber supplement if you cannot get enough fiber from food.  Exercise regularly.  Regular physical activity can help stimulate your intestines. Walking is a good exercise to prevent or relieve constipation. Ask which exercises are best for you.  Schedule a time each day to have a bowel movement.  This may help train your body to have regular bowel movements. Bend  forward while you are on the toilet to help move the bowel movement out. Sit on the toilet for at least 10 minutes, even if you do not have a bowel movement.    Store narcotics safely:   Store narcotics where others cannot easily get them.  Keep them in a locked cabinet or secure area. Do not  keep them in a purse or other bag you carry with you. A person may be looking for something else and find the narcotics.  Make sure narcotics are stored out of the reach of children.  A child can easily overdose on narcotics. Narcotics may look like candy to a small child.    The best way to dispose of narcotics:      The laws vary by country and area. In the [place], the best way is to return the narcotics through a take-back program. This program is offered by the [place] Drug Enforcement Agency (SARA). The following are options for using the program:  Take the narcotics to a SARA collection site.  The site is often a law enforcement center. Call your local law enforcement center for scheduled take-back days in your area. You will be given information on where to go if the collection site is in a different location.  Take the narcotics to an approved pharmacy or hospital.  A pharmacy or hospital may be set up as a collection site. You will need to ask if it is a SARA collection site if you were not directed there. A pharmacy or doctor's office may not be able to take back narcotics unless it is a SARA site.  Use a mail-back system.  This means you are given containers to put the narcotics into. You will then mail them in the containers.  Use a take-back drop box.  This is a place to leave the narcotics at any time. People and animals will not be able to get into the box. Your local law enforcement agency can tell you where to find a drop box in your area.    Other ways to manage pain:   Ask your healthcare provider about non-narcotic medicines to control pain.  Nonprescription medicines include NSAIDs (such as ibuprofen) and  acetaminophen. Prescription medicines include muscle relaxers, antidepressants, and steroids.  Pain may be managed without any medicines.  Some ways to relieve pain include massage, aromatherapy, or meditation. Physical or occupational therapy may also help.    For more information:   Drug Enforcement Administration  [address] [place] [address]  Phone: 6- 032 - 813-3630  Web Address: https://www.deadiversion.[facility]gov/drug_disposal/    [place] Food and Drug Administration  [address] [place] [address]  Phone: 4- 965 - 954-2705  Web Address: http://www.fda.gov     © Copyright [employer] 2018 Information is for End User's use only and may not be sold, redistributed or otherwise used for commercial purposes. All illustrations and images included in CareNotes® are the copyrighted property of [employer].D.A[employer]M., Inc. or [employer]

## 2025-02-27 NOTE — ASSESSMENT & PLAN NOTE
Follows with GI: Recommended patient start ppi but patient is apprehensive  Remains on pepcid   Son was recently dx with h. Pylori  Follow up h pylori breath test    Orders:  •  H. pylori breath test; Future

## 2025-02-27 NOTE — PROGRESS NOTES
Name: Anuja Mares      : 1940      MRN: [id number]  Encounter Provider: Mani Gibbons DO  Encounter Date: 2025   Encounter department: [facility]    Assessment & Plan  Medicare annual wellness visit, subsequent         Gastroesophageal reflux disease without esophagitis  Follows with GI: Recommended patient start ppi but patient is apprehensive  Remains on pepcid   Son was recently dx with h. Pylori  Follow up h pylori breath test    Orders:  •  H. pylori breath test; Future    Adjustment disorder with anxious mood  Waxes and wanes  Has 8 hours per week        Anemia, unspecified type  Due for repeat cbc  Orders:  •  CBC and differential; Future    Malignant neoplasm of female breast, unspecified estrogen receptor status, unspecified laterality, unspecified site of breast (HCC)  Stable  S/p bilateral mastectomy       Cervical stenosis of spinal canal  Stable  Denies recurrent flare ups  Continue to monitor  Encouraged stretching and exercise       Microscopic hematuria  Stable  Due for repeat ua  Orders:  •  UA w Reflex to Microscopic w Reflex to Culture; Future    Screening for diabetes mellitus    Orders:  •  Comprehensive metabolic panel; Future    Psoriasis  Patient reports an acute flare up of arthralgias in her finger joints, wrists, and shoulders  Will evaluate for pmr vs RA, vs SLE vs psoriatic arthritis  Orders:  •  Sedimentation rate, automated; Future  •  SINDY Screen w/Reflex Cascade; Future  •  Cyclic citrul peptide antibody, IgG; Future  •  RF Screen w/ Reflex to Titer; Future  •  Lyme Total AB W Reflex to IGM/IGG; Future  •  C-reactive protein; Future    Arthralgia, unspecified joint    Orders:  •  Sedimentation rate, automated; Future  •  SINDY Screen w/Reflex Cascade; Future  •  Cyclic citrul peptide antibody, IgG; Future  •  RF Screen w/ Reflex to Titer; Future  •  Lyme Total AB W Reflex to IGM/IGG; Future  •  C-reactive protein; Future        Preventive health issues were discussed with patient, and age appropriate screening tests were ordered as noted in patient's After Visit Summary. Personalized health advice and appropriate referrals for health education or preventive services given if needed, as noted in patient's After Visit Summary.    History of Present Illness     HPI  presents today for routine follow up. Main complaint is arthralgias of hands  Patient Care Team:  Mani Gibbons DO as PCP - General (Family Medicine)  TANISHA Bee PA-C (Gastroenterology)  KEARA Godinez as Nurse Practitioner (Gastroenterology)    Review of Systems   Constitutional:  Negative for activity change, chills, diaphoresis and fever.   HENT:  Negative for ear pain, hearing loss, postnasal drip, rhinorrhea, sinus pressure, sinus pain, sneezing and sore throat.    Respiratory:  Negative for cough, chest tightness, shortness of breath and wheezing.    Cardiovascular:  Negative for chest pain, palpitations and leg swelling.   Gastrointestinal:  Negative for abdominal pain, blood in stool, constipation, diarrhea, nausea and vomiting.   Genitourinary:  Negative for dysuria, frequency, hematuria and urgency.   Musculoskeletal:  Negative for arthralgias and myalgias.   Neurological:  Negative for dizziness, syncope, weakness, light-headedness, numbness and headaches.     Medical History Reviewed by provider this encounter:  Tobacco  Allergies  Meds  Problems  Med Hx  Surg Hx  Fam Hx       Annual Wellness Visit Questionnaire   Anuja is here for her Subsequent Wellness visit. Last Medicare Wellness visit information reviewed, patient interviewed and updates made to the record today.      Health Risk Assessment:   Patient rates overall health as good. Patient feels that their physical health rating is slightly worse. Patient is dissatisfied with their life. Eyesight was rated as slightly worse. Hearing was rated as  slightly worse. Patient feels that their emotional and mental health rating is same. Patients states they are often angry. Patient states they are often unusually tired/fatigued. Pain experienced in the last 7 days has been none. Patient states that she has experienced no weight loss or gain in last 6 months.     Depression Screening:   PHQ-9 Score: 0      Fall Risk Screening:   In the past year, patient has experienced: no history of falling in past year      Urinary Incontinence Screening:   Patient has not leaked urine accidently in the last six months.     Home Safety:  Patient does not have trouble with stairs inside or outside of their home. Patient has working smoke alarms and has working carbon monoxide detector. Home safety hazards include: none.     Nutrition:   Current diet is Regular.     Medications:   Patient is currently taking over-the-counter supplements. OTC medications include: see medication list. Patient is able to manage medications.     Activities of Daily Living (ADLs)/Instrumental Activities of Daily Living (IADLs):   Walk and transfer into and out of bed and chair?: Yes  Dress and groom yourself?: Yes    Bathe or shower yourself?: Yes    Feed yourself? Yes  Do your laundry/housekeeping?: Yes  Manage your money, pay your bills and track your expenses?: Yes  Make your own meals?: Yes    Do your own shopping?: Yes    Previous Hospitalizations:   Any hospitalizations or ED visits within the last 12 months?: No      Advance Care Planning:   Living will: Yes    Durable POA for healthcare: Yes    Advanced directive: Yes    Advanced directive counseling given: Yes    End of Life Decisions reviewed with patient: Yes    Provider agrees with end of life decisions: Yes      Cognitive Screening:   Provider or family/friend/caregiver concerned regarding cognition?: No    PREVENTIVE SCREENINGS      Cardiovascular Screening:    General: Screening Current      Diabetes Screening:     General: Screening Not  Indicated      Colorectal Cancer Screening:     General: Screening Not Indicated      Breast Cancer Screening:     General: History Breast Cancer      Cervical Cancer Screening:    General: Screening Not Indicated      Osteoporosis Screening:    General: Screening Not Indicated      Abdominal Aortic Aneurysm (AAA) Screening:        General: Screening Not Indicated      Lung Cancer Screening:     General: Screening Not Indicated    Cardiovascular Risk Assessment:  Patient does not have underlying ASCVD and their cardiovascular risk was assessed today.     The ASCVD Risk score (Konstantin MAO, et al., 2019) failed to calculate for the following reasons:    The 2019 ASCVD risk score is only valid for ages 40 to 79    Time spent assessing cardiovascular risk: 5 minutes.     Screening, Brief Intervention, and Referral to Treatment (SBIRT)     Screening      Single Item Drug Screening:  How often have you used an illegal drug (including marijuana) or a prescription medication for non-medical reasons in the past year? never    Single Item Drug Screen Score: 0  Interpretation: Negative screen for possible drug use disorder    Review of Current Opioid Use  Opioid Risk Tool (ORT) Score: 0  Opioid Risk Tool (ORT) Interpretation: Score 0-3: Low risk for opioid misuse    Other Counseling Topics:   Car/seat belt/driving safety, skin self-exam, sunscreen and regular weightbearing exercise and calcium and vitamin D intake.     Social Drivers of Health     Financial Resource Strain: Low Risk  (7/31/2023)    Overall Financial Resource Strain (CARDIA)    • Difficulty of Paying Living Expenses: Not hard at all   Food Insecurity: No Food Insecurity (2/27/2025)    Hunger Vital Sign    • Worried About Running Out of Food in the Last Year: Never true    • Ran Out of Food in the Last Year: Never true   Transportation Needs: No Transportation Needs (2/27/2025)    PRAPARE - Transportation    • Lack of Transportation (Medical): No    • Lack of  "Transportation (Non-Medical): No   Housing Stability: Low Risk  (2/27/2025)    Housing Stability Vital Sign    • Unable to Pay for Housing in the Last Year: No    • Number of Times Moved in the Last Year: 1    • Homeless in the Last Year: No   Utilities: Not At Risk (2/27/2025)    [facility] Utilities    • Threatened with loss of utilities: No     No results found.    Objective   /76 (BP Location: Left arm, Patient Position: Sitting, Cuff Size: Large)   Pulse 86   Temp 97.6 °F (36.4 °C)   Ht 5' 7\" (1.702 m)   Wt 68.9 kg (151 lb 12.8 oz)   SpO2 99%   BMI 23.78 kg/m²     Physical Exam  Constitutional:       General: She is not in acute distress.     Appearance: Normal appearance. She is well-developed. She is not diaphoretic.   HENT:      Head: Normocephalic and atraumatic.      Right Ear: Tympanic membrane, ear canal and external ear normal. There is no impacted cerumen.      Left Ear: Tympanic membrane, ear canal and external ear normal. There is no impacted cerumen.      Nose: Nose normal. No congestion or rhinorrhea.      Mouth/Throat:      Mouth: Mucous membranes are moist.      Pharynx: Oropharynx is clear. No oropharyngeal exudate.   Eyes:      Conjunctiva/sclera: Conjunctivae normal.      Pupils: Pupils are equal, round, and reactive to light.   Neck:      Vascular: No JVD.   Cardiovascular:      Rate and Rhythm: Normal rate and regular rhythm.      Heart sounds: Normal heart sounds. No murmur heard.     No friction rub. No gallop.   Pulmonary:      Effort: Pulmonary effort is normal. No respiratory distress.      Breath sounds: Normal breath sounds. No wheezing or rales.   Chest:      Chest wall: No tenderness.   Abdominal:      General: Bowel sounds are normal. There is no distension.      Palpations: Abdomen is soft.      Tenderness: There is no abdominal tenderness. There is no right CVA tenderness, left CVA tenderness, guarding or rebound.   Musculoskeletal:         General: No tenderness. Normal " range of motion.      Cervical back: No tenderness.   Lymphadenopathy:      Cervical: No cervical adenopathy.   Skin:     General: Skin is warm and dry.   Neurological:      Mental Status: She is alert and oriented to person, place, and time.      Cranial Nerves: No cranial nerve deficit.   Psychiatric:         Mood and Affect: Mood and affect normal.         Behavior: Behavior normal.

## 2025-02-28 NOTE — ASSESSMENT & PLAN NOTE
Patient reports an acute flare up of arthralgias in her finger joints, wrists, and shoulders  Will evaluate for pmr vs RA, vs SLE vs psoriatic arthritis  Orders:  •  Sedimentation rate, automated; Future  •  SINDY Screen w/Reflex Cascade; Future  •  Cyclic citrul peptide antibody, IgG; Future  •  RF Screen w/ Reflex to Titer; Future  •  Lyme Total AB W Reflex to IGM/IGG; Future  •  C-reactive protein; Future

## 2025-03-03 ENCOUNTER — APPOINTMENT (OUTPATIENT)
Dept: LAB | Facility: CLINIC | Age: 85
End: 2025-03-03
Payer: MEDICARE

## 2025-03-03 DIAGNOSIS — M25.50 ARTHRALGIA, UNSPECIFIED JOINT: ICD-10-CM

## 2025-03-03 DIAGNOSIS — K21.9 GASTROESOPHAGEAL REFLUX DISEASE WITHOUT ESOPHAGITIS: Chronic | ICD-10-CM

## 2025-03-03 DIAGNOSIS — R31.29 MICROSCOPIC HEMATURIA: ICD-10-CM

## 2025-03-03 DIAGNOSIS — L40.9 PSORIASIS: ICD-10-CM

## 2025-03-03 LAB
BACTERIA UR QL AUTO: ABNORMAL /HPF
BILIRUB UR QL STRIP: NEGATIVE
CAOX CRY URNS QL MICRO: ABNORMAL /HPF
CLARITY UR: CLEAR
COLOR UR: ABNORMAL
CRP SERPL QL: 2.2 MG/L
ERYTHROCYTE [SEDIMENTATION RATE] IN BLOOD: 11 MM/HOUR (ref 0–29)
GLUCOSE UR STRIP-MCNC: NEGATIVE MG/DL
HGB UR QL STRIP.AUTO: ABNORMAL
KETONES UR STRIP-MCNC: NEGATIVE MG/DL
LEUKOCYTE ESTERASE UR QL STRIP: NEGATIVE
MUCOUS THREADS UR QL AUTO: ABNORMAL
NITRITE UR QL STRIP: NEGATIVE
NON-SQ EPI CELLS URNS QL MICRO: ABNORMAL /HPF
PH UR STRIP.AUTO: 6 [PH]
PROT UR STRIP-MCNC: ABNORMAL MG/DL
RBC #/AREA URNS AUTO: ABNORMAL /HPF
SP GR UR STRIP.AUTO: 1.02 (ref 1–1.03)
UROBILINOGEN UR STRIP-ACNC: <2 MG/DL
WBC #/AREA URNS AUTO: ABNORMAL /HPF

## 2025-03-03 PROCEDURE — 85652 RBC SED RATE AUTOMATED: CPT

## 2025-03-03 PROCEDURE — 83014 H PYLORI DRUG ADMIN: CPT

## 2025-03-03 PROCEDURE — 86618 LYME DISEASE ANTIBODY: CPT

## 2025-03-03 PROCEDURE — 86430 RHEUMATOID FACTOR TEST QUAL: CPT

## 2025-03-03 PROCEDURE — 86038 ANTINUCLEAR ANTIBODIES: CPT

## 2025-03-03 PROCEDURE — 36415 COLL VENOUS BLD VENIPUNCTURE: CPT

## 2025-03-03 PROCEDURE — 81001 URINALYSIS AUTO W/SCOPE: CPT

## 2025-03-03 PROCEDURE — 83013 H PYLORI (C-13) BREATH: CPT

## 2025-03-03 PROCEDURE — 86140 C-REACTIVE PROTEIN: CPT

## 2025-03-03 PROCEDURE — 86200 CCP ANTIBODY: CPT

## 2025-03-03 PROCEDURE — 86225 DNA ANTIBODY NATIVE: CPT

## 2025-03-04 LAB
B BURGDOR IGG+IGM SER QL IA: NEGATIVE
RHEUMATOID FACT SER QL LA: NEGATIVE
UREA BREATH TEST QL: NEGATIVE

## 2025-03-06 LAB
DSDNA IGG SERPL IA-ACNC: <0.9 IU/ML (ref ?–15)
NUCLEAR IGG SER IA-RTO: 0.2 RATIO (ref ?–1)

## 2025-03-07 LAB — CCP AB SER IA-ACNC: 1.8 (ref ?–10)

## 2025-04-10 ENCOUNTER — TELEPHONE (OUTPATIENT)
Age: 85
End: 2025-04-10

## 2025-04-10 NOTE — TELEPHONE ENCOUNTER
Called patient back. No answer. Left  as voicemail box stated patient's name that MRI would look at her abdomen and pancreas due to her family history of pancreatic cancer and nonspecific GI symptoms when seen in February. Advised patient to call back to clinic if she would like to have this done.

## 2025-04-10 NOTE — TELEPHONE ENCOUNTER
Patients GI provider:  Dr. Carranza    Number to return call: [phone]    Reason for call: Pt calling to request order for MRI that pt discussed on 2/5/25. Pt would like a call back for clarification on what area the MRI would cover.     Scheduled procedure/appointment date if applicable: NA

## 2025-06-17 RX ORDER — HYDROXYZINE HYDROCHLORIDE 10 MG/1
TABLET, FILM COATED ORAL
COMMUNITY
Start: 2025-03-10

## 2025-06-18 ENCOUNTER — OFFICE VISIT (OUTPATIENT)
Dept: GASTROENTEROLOGY | Facility: CLINIC | Age: 85
End: 2025-06-18
Payer: MEDICARE

## 2025-06-18 ENCOUNTER — APPOINTMENT (OUTPATIENT)
Dept: LAB | Facility: CLINIC | Age: 85
End: 2025-06-18
Attending: STUDENT IN AN ORGANIZED HEALTH CARE EDUCATION/TRAINING PROGRAM
Payer: MEDICARE

## 2025-06-18 VITALS
TEMPERATURE: 99.3 F | SYSTOLIC BLOOD PRESSURE: 112 MMHG | BODY MASS INDEX: 23.23 KG/M2 | WEIGHT: 148 LBS | DIASTOLIC BLOOD PRESSURE: 72 MMHG | HEIGHT: 67 IN

## 2025-06-18 DIAGNOSIS — K21.9 GASTROESOPHAGEAL REFLUX DISEASE WITHOUT ESOPHAGITIS: ICD-10-CM

## 2025-06-18 DIAGNOSIS — K59.09 CONSTIPATION, CHRONIC: ICD-10-CM

## 2025-06-18 DIAGNOSIS — Z80.0 FAMILY HISTORY OF PANCREATIC CANCER: ICD-10-CM

## 2025-06-18 DIAGNOSIS — R14.0 BLOATING: Primary | ICD-10-CM

## 2025-06-18 DIAGNOSIS — R14.0 BLOATING: ICD-10-CM

## 2025-06-18 DIAGNOSIS — Z80.0 FAMILY HISTORY OF GASTRIC CANCER: ICD-10-CM

## 2025-06-18 LAB — IGA SERPL-MCNC: 400 MG/DL (ref 66–433)

## 2025-06-18 PROCEDURE — 86364 TISS TRNSGLTMNASE EA IG CLAS: CPT

## 2025-06-18 PROCEDURE — 36415 COLL VENOUS BLD VENIPUNCTURE: CPT

## 2025-06-18 PROCEDURE — 99214 OFFICE O/P EST MOD 30 MIN: CPT | Performed by: INTERNAL MEDICINE

## 2025-06-18 PROCEDURE — 82784 ASSAY IGA/IGD/IGG/IGM EACH: CPT

## 2025-06-18 NOTE — PATIENT INSTRUCTIONS
Placed patient on recall list to be scheduled for the 6M F/U with Dr. Mo (pt prefers Dr. Mo). Gave lab order to patient.

## 2025-06-18 NOTE — PROGRESS NOTES
Name: Anuja Mares      : 1940      MRN: [id number]  Encounter Provider: Maxine Mo DO  Encounter Date: 2025   Encounter department: [facility] [place]  :  Assessment & Plan  Bloating  Constipation, chronic  Gastroesophageal reflux disease without esophagitis  84 y.o. female GERD and family history of pancreatic cancer and gastric cancer who presents for follow-up.  She was last seen in 2025 for GERD. H. Pylori breath test was negative. TSH within the past year was normal. Since last visit, her Pepcid was increased to 20 mg and she reports improvement in her reflux symptoms at this time.  She does report some ongoing bloating and intermittent constipation which she has dealt with for most of her life and she says she is not interested in a bowel regimen at this time. We will obtain celiac serologies and recommended low FODMAP diet in the interim. She can continue Pepcid at current dose for now. We will assess her symptoms at next visit.     Orders:    Celiac Panel/Adult; Future    Family history of pancreatic cancer  Family history of gastric cancer  She reports a family history of pancreatic cancer diagnosed in her sister at age 62 which she passed away from.  She also reports her father was diagnosed with gastric cancer at age 80 and her paternal grandmother was diagnosed with gastric cancer as well at age 60. At this time, she defers screening with MRI or EUS which is reasonable given her age and current lack of warning signs and she also acknowledges that she would not want aggressive treatments in the future even if something was discovered.          History of Present Illness   HPI  Anuja Mares is a 84 y.o. female GERD and family history of pancreatic cancer and gastric cancer who presents for follow-up.  She was last seen in 2025 for GERD and discussion regarding her family history.  Since last visit, her Pepcid was increased to 20 mg  and she reports improvement in her reflux symptoms at this time.  She does report some ongoing bloating and intermittent constipation which she has dealt with for most of her life she says she is not interested in a bowel regimen at this time.  She reports a family history of pancreatic cancer diagnosed in her sister at age 62 which she passed away from.  She also reports her father was diagnosed with gastric cancer at age 80 and her paternal grandmother was diagnosed with gastric cancer as well at age 60. At this time, she defers screening for pancreatic cancer which is reasonable given her age and current lack of warning signs.    Review of Systems Negative other than stated above     Objective   There were no vitals taken for this visit.     Physical Exam  General: No apparent distress, resting comfortably  Head: Normocephalic, atraumatic  Eyes: Anicteric, no conjunctival erythema  ENT: External ear normal, no nasal discharge  Neck: Trachea midline, no visible lymphadenopathy  Respiratory:  Non-labored respirations, symmetric thorax expansion  Cardiovascular:  Extremities appear well-perfused  Abdomen: Non-distended  Extremities: Moves extremities spontaneously  Skin: No visible rashes or jaundice  Neuro: No gross focal deficits, no aphasia     Maxine Mo D.O.  Gastroenterology Fellow, PGY-4  [facility]

## 2025-06-18 NOTE — ASSESSMENT & PLAN NOTE
84 y.o. female GERD and family history of pancreatic cancer and gastric cancer who presents for follow-up.  She was last seen in February 2025 for GERD. H. Pylori breath test was negative. TSH within the past year was normal. Since last visit, her Pepcid was increased to 20 mg and she reports improvement in her reflux symptoms at this time.  She does report some ongoing bloating and intermittent constipation which she has dealt with for most of her life and she says she is not interested in a bowel regimen at this time. We will obtain celiac serologies and recommended low FODMAP diet in the interim. She can continue Pepcid at current dose for now. We will assess her symptoms at next visit.     Orders:    Celiac Panel/Adult; Future

## 2025-06-22 ENCOUNTER — RESULTS FOLLOW-UP (OUTPATIENT)
Dept: GASTROENTEROLOGY | Facility: CLINIC | Age: 85
End: 2025-06-22

## 2025-06-22 LAB — TTG IGA SER IA-ACNC: 1.8 U/ML (ref ?–10)

## 2025-07-28 ENCOUNTER — TELEPHONE (OUTPATIENT)
Dept: PHYSICAL THERAPY | Facility: CLINIC | Age: 85
End: 2025-07-28

## 2025-08-12 ENCOUNTER — OFFICE VISIT (OUTPATIENT)
Dept: FAMILY MEDICINE CLINIC | Facility: CLINIC | Age: 85
End: 2025-08-12
Payer: MEDICARE

## 2025-08-12 PROBLEM — I95.1 ORTHOSTATIC HYPOTENSION: Status: ACTIVE | Noted: 2025-08-12
